# Patient Record
Sex: MALE | Race: WHITE | Employment: FULL TIME | URBAN - NONMETROPOLITAN AREA
[De-identification: names, ages, dates, MRNs, and addresses within clinical notes are randomized per-mention and may not be internally consistent; named-entity substitution may affect disease eponyms.]

---

## 2017-01-29 ENCOUNTER — THERAPY VISIT (OUTPATIENT)
Dept: SLEEP MEDICINE | Facility: HOSPITAL | Age: 52
End: 2017-01-29
Attending: INTERNAL MEDICINE
Payer: COMMERCIAL

## 2017-01-29 DIAGNOSIS — G47.419 NARCOLEPSY WITHOUT CATAPLEXY(347.00): Primary | ICD-10-CM

## 2017-01-29 DIAGNOSIS — G47.33 OBSTRUCTIVE SLEEP APNEA SYNDROME: ICD-10-CM

## 2017-01-29 PROCEDURE — 95811 POLYSOM 6/>YRS CPAP 4/> PARM: CPT

## 2017-01-29 PROCEDURE — 95811 POLYSOM 6/>YRS CPAP 4/> PARM: CPT | Mod: 26 | Performed by: INTERNAL MEDICINE

## 2017-01-29 NOTE — Clinical Note
Mikey Jenkins  [address] [facility] [address]    February 1, 2017         Dear Mikey      I recently read your sleep study, you do have sleep apnea stopping or slowing your breathing  about 20 times per hour.     This is likely disturbing your sleep and making you feel tired during the day. I think we should try you on  the CPAP mask , hopefully it will make you feel more rested during the day and may help protect you from future health problems.     I will ask our sleep lab staff to set up a trial of the mask, if you have any problems or concerns please give us a call.     I'll plan to see you in follow up in the future and I hope it helps.                                                                                       Sincerely,          Addy Daly MD, D,[facility]           [address] [place] [id number] [phone]

## 2017-01-30 NOTE — PROGRESS NOTES
The Pt was identified by name and  as well as wristband.  He is a 52yo man with c/o EDS. He has been told that he snores loudly.  Sleep testing and possible findings were discussed during hook up.  TIERRA and CPAP therapy were explained as he was fitted with a ResMed Airfit N20 medium mask.  Sleep onset was normal.  He exhibits loud snoring and snore arousals in nonREM sleep.  He had a shortened REM latency.  His REM sleep was fractured with apneas and hypopneas. The SpO2 baseline in sleep was 93%. The lowest SpO2 was 83% following an apnea while supine.  He reports a fairly normal night.  He could not say his sleep was any different with the CPAP.  He was up to the toilet early because he went to sleep earlier than usual.  The preliminary results were discussed and the probable treatment plan was explained.  After reviewing the vendor choices, he prefers [facility] [employer] [place] in [place] for his DME.

## 2017-01-30 NOTE — PROGRESS NOTES
Patient is a 52 y/o male in with c/o snoring and EDS.  Light to loud snoring with associated obstructive respiratory events noted, predominantly while supine, being more severe during REM stage.  CPAP-5cmH2O was initiated, increased to a final pressure of 7cmH2O due to snoring.  Patient tolerated study and CPAP well.

## 2017-02-01 NOTE — PROGRESS NOTES
50 y/o referred by Dr Le for excessive daytime somnolence.   Overnight 18 channel polysomnography was done 1/29/17, I reviewed the raw data in detail.Sleep efficiency was normal with a shortened sleep latency and a normal REM latency. Sleep architecture shows all stages seen in usual amounts for age. Baseline oxyhemoglobin saturation was 96%. The ECG was monitored and no arrhythmias were seen. There were no significant periodic leg movements noted.              Technician noted loud snoring. We measured 11 apneas and 59 hypopneas early in the study. These events were associated with EEG arousals and desats down to 83%. The apnea hypopnea index was 20 events per hour.              The patient was tried on nasal CPAP, which was tolerated well. Titration was gradually adjusted to a final level of 7 cm of H2O, pt slept on this level for 3 hours and sleep architecture was improved.    Impression: TIERRA  cpap at 7.

## 2017-02-24 ENCOUNTER — TELEPHONE (OUTPATIENT)
Dept: FAMILY MEDICINE | Facility: OTHER | Age: 52
End: 2017-02-24

## 2017-02-24 DIAGNOSIS — M10.00 IDIOPATHIC GOUT, UNSPECIFIED CHRONICITY, UNSPECIFIED SITE: ICD-10-CM

## 2017-02-24 RX ORDER — ALLOPURINOL 300 MG/1
1 TABLET ORAL DAILY
Qty: 90 TABLET | Refills: 0 | Status: SHIPPED | OUTPATIENT
Start: 2017-02-24 | End: 2017-06-28

## 2017-02-24 NOTE — TELEPHONE ENCOUNTER
Reason for call:  Medication    1. Medication Name?  Allopurinal  2. Is this request for a refill? Yes  3. What Pharmacy do you use?  [facility]  4. Have you contacted your pharmacy? Yes  5. If yes, when? Today  (Please note that the turn-around-time for prescriptions is 72 business hours; I am sending your request at this time. SEND TO  [facility]  )  Description:  Mikey is out of his medication and will be gone this weekend. Please call Mikey with any questions  Was an appointment offered for this a call? No  Preferred method for responding to this message  [phone]  If we cannot reach you directly, may we leave a detailed response at the number you provided? Yes

## 2017-03-21 ENCOUNTER — TRANSFERRED RECORDS (OUTPATIENT)
Dept: HEALTH INFORMATION MANAGEMENT | Facility: HOSPITAL | Age: 52
End: 2017-03-21

## 2017-03-21 ENCOUNTER — TELEPHONE (OUTPATIENT)
Dept: FAMILY MEDICINE | Facility: OTHER | Age: 52
End: 2017-03-21

## 2017-03-21 ENCOUNTER — OFFICE VISIT (OUTPATIENT)
Dept: UROLOGY | Facility: OTHER | Age: 52
End: 2017-03-21
Attending: NURSE PRACTITIONER
Payer: COMMERCIAL

## 2017-03-21 VITALS
BODY MASS INDEX: 29.82 KG/M2 | TEMPERATURE: 96.5 F | DIASTOLIC BLOOD PRESSURE: 80 MMHG | HEIGHT: 71 IN | WEIGHT: 213 LBS | SYSTOLIC BLOOD PRESSURE: 110 MMHG | HEART RATE: 60 BPM

## 2017-03-21 DIAGNOSIS — E29.1 TESTICULAR HYPOFUNCTION: Primary | ICD-10-CM

## 2017-03-21 DIAGNOSIS — N52.9 ERECTILE DYSFUNCTION, UNSPECIFIED ERECTILE DYSFUNCTION TYPE: ICD-10-CM

## 2017-03-21 PROCEDURE — 99213 OFFICE O/P EST LOW 20 MIN: CPT | Performed by: NURSE PRACTITIONER

## 2017-03-21 RX ORDER — SILDENAFIL 50 MG/1
TABLET, FILM COATED ORAL
Qty: 6 TABLET | Refills: 11 | Status: SHIPPED | OUTPATIENT
Start: 2017-03-21 | End: 2018-06-28

## 2017-03-21 RX ORDER — TESTOSTERONE CYPIONATE 200 MG/ML
200 INJECTION, SOLUTION INTRAMUSCULAR
Qty: 1 ML | Refills: 5 | OUTPATIENT
Start: 2017-03-21 | End: 2017-08-03

## 2017-03-21 ASSESSMENT — PAIN SCALES - GENERAL: PAINLEVEL: NO PAIN (0)

## 2017-03-21 NOTE — TELEPHONE ENCOUNTER
Last appointment was in November, follow-up appointment with labs was recommended in 6 months, which would be May.

## 2017-03-21 NOTE — TELEPHONE ENCOUNTER
Pt called wanting to know if he is supposed to come in for labs, regarding a prescription he thinks its for Lipitor. Pt will be in on Weds morning for lab. Pt wants off Lipitor because of one of the side affect of memory loss. Please call [phone]

## 2017-03-21 NOTE — TELEPHONE ENCOUNTER
So he dosent need to come in, in the am for labs yet? And to schedule him in May?     He is out of his lipitor

## 2017-03-21 NOTE — MR AVS SNAPSHOT
After Visit Summary   3/21/2017    Mikey Jenkins    MRN: [id number]           Patient Information     Date Of Birth          1965        Visit Information        Provider Department      3/21/2017 1:00 PM Berna Rey NP [facility]        Today's Diagnoses     Testicular hypofunction    -  1    Erectile dysfunction, unspecified erectile dysfunction type          Care Instructions    I ordered you Viagra for the erectile function.  Remember that if you have an erection lasting more than 4 hours you must go to the ER.  Read the other information below about Viagra.  (vision loss, etc...)  Please have your labs done again in June, mid way between injections, before 10:00 am, then see me a week later.        The following information is important for you to know regarding testosterone supplementation:  1) Testosterone may be supplied in the IM (shot) form, by patches, and by cream or gel.  2) The shot will be given every two weeks, relieving you of the responsibility of daily application.  The disadvantages include pain at injection site and possible fluctuations in mood, libido and energy.  3) Patches may be used, the primary disadvantage may be a rash that can develop from the patch.  4) Gels or creams must be placed daily.  They deliver a steady stream of testosterone.  It is important for you to know that you should follow package directions regarding how long you must wait before swimming or showering.  You should also not have skin to skin contact with children or females without washing the area first with soap and water.  You might otherwise transfer some testosterone accidentally to them.    5) Some issues to be aware of with testosterone supplementation:  You may experience acne or gynecomastia (tender or enlarged breasts), you may need to be screened for prostate cancer because it is possible that testosterone therapy could make prostate cancer worse , you could possibly  experience a urinary outflow problem from your prostate, sleep apnea may become worse (Ok if treated with a Cpap already), your hematocrit may become abnormally high and fertility may be affected.      Testosterone Supplementation Expectations:    I follow the guideline from the Endocrinology Society, for your safety.    1)  After our initial visit, with a digital rectal exam, we may start your supplementation if the lab levels indicate a need for it.  Initial labs most often include a testosterone level, a free testosterone level, a PSA and a hematocrit.  2)  You must see me again in 3 months to check how you are doing.  This is a clinic appt.You should have your labs drawn about a week prior to your appointment.  This will include a testosterone level and a hematocrit.  Your labs must be done prior to 10:00 am and if you are receiving injections, it must be mid way between injections.  3) Six months after you start therapy you should have labs again, labs only, no clinic appointment.  This will usually be a testosterone level and a hematocrit. Follow the same rules for labs as above.  4) One year after you have started therapy you must see me in the clinic again.  You should have labs done about a week before.  These will include usually a testosterone level, a PSA and a hematocrit.    5) If you become well established with the therapy we can reduce your appointments now to yearly.  You may need labs at 6 months.    However... Please note that if you need dose adjustments, this schedule may change and I may need labs sooner.  (or if you have problems)  I am most concerned about your safety.  If you do not have labs and appointments as scheduled, I will not order more testosterone.         Patient Education    Sildenafil Citrate Oral suspension [Pulmonary Hypertension]    Sildenafil Citrate Oral tablet    Sildenafil Citrate Oral tablet [Pulmonary Hypertension]    Sildenafil Citrate Solution for injection [Pulmonary  Hypertension]  Sildenafil Citrate Oral tablet  What is this medicine?  SILDENAFIL (lux DEN a qiana) is used to treat erection problems in men.  This medicine may be used for other purposes; ask your health care provider or pharmacist if you have questions.  What should I tell my health care provider before I take this medicine?  They need to know if you have any of these conditions:    bleeding disorders    eye or vision problems, including a rare inherited eye disease called retinitis pigmentosa    anatomical deformation of the penis, Peyronie s disease, or history of priapism (painful and prolonged erection)    heart disease, angina, a history of heart attack, irregular heart beats, or other heart problems    high or low blood pressure    history of blood diseases, like sickle cell anemia or leukemia    history of stomach bleeding    kidney disease    liver disease    stroke    an unusual or allergic reaction to sildenafil, other medicines, foods, dyes, or preservatives    pregnant or trying to get pregnant    breast-feeding  How should I use this medicine?  Take this medicine by mouth with a glass of water. Follow the directions on the prescription label. The dose is usually taken 1 hour before sexual activity. You should not take the dose more than once per day. Do not take your medicine more often than directed.  Talk to your pediatrician regarding the use of this medicine in children. This medicine is not used in children for this condition.  Overdosage: If you think you have taken too much of this medicine contact a poison control center or emergency room at once.  NOTE: This medicine is only for you. Do not share this medicine with others.  What if I miss a dose?  This does not apply. Do not take double or extra doses.  What may interact with this medicine?  Do not take this medicine with any of the following medications:    cisapride    methscopolamine nitrate    nitrates like amyl nitrite, isosorbide  dinitrate, isosorbide mononitrate, nitroglycerin    nitroprusside    other medicines for erectile dysfunction like avanafil, tadalafil, vardenafil    riociguat    other sildenafil products (Revatio)  This medicine may also interact with the following medications:    certain drugs for high blood pressure    certain drugs for the treatment of HIV infection or AIDS    certain drugs used for fungal or yeast infections, like fluconazole, itraconazole, ketoconazole, and voriconazole    cimetidine    erythromycin    rifampin  This list may not describe all possible interactions. Give your health care provider a list of all the medicines, herbs, non-prescription drugs, or dietary supplements you use. Also tell them if you smoke, drink alcohol, or use illegal drugs. Some items may interact with your medicine.  What should I watch for while using this medicine?  If you notice any changes in your vision while taking this drug, call your doctor or health care professional as soon as possible. Stop using this medicine and call your health care provider right away if you have a loss of sight in one or both eyes.  Contact your doctor or health care professional right away if you have an erection that lasts longer than 4 hours or if it becomes painful. This may be a sign of a serious problem and must be treated right away to prevent permanent damage.  If you experience symptoms of nausea, dizziness, chest pain or arm pain upon initiation of sexual activity after taking this medicine, you should refrain from further activity and call your doctor or health care professional as soon as possible.  Do not drink alcohol to excess (examples, 5 glasses of wine or 5 shots of whiskey) when taking this medicine. When taken in excess, alcohol can increase your chances of getting a headache or getting dizzy, increasing your heart rate or lowering your blood pressure.  Using this medicine does not protect you or your partner against HIV  infection (the virus that causes AIDS) or other sexually transmitted diseases.  What side effects may I notice from receiving this medicine?  Side effects that you should report to your doctor or health care professional as soon as possible:    allergic reactions like skin rash, itching or hives, swelling of the face, lips, or tongue    breathing problems    changes in hearing    changes in vision    chest pain    fast, irregular heartbeat    prolonged or painful erection    seizures  Side effects that usually do not require medical attention (report to your doctor or health care professional if they continue or are bothersome):    back pain    dizziness    flushing    headache    indigestion    muscle aches    nausea    stuffy or runny nose  This list may not describe all possible side effects. Call your doctor for medical advice about side effects. You may report side effects to FDA at 0-009-TIY-6547.  Where should I keep my medicine?  Keep out of reach of children.  Store at room temperature between 15 and 30 degrees C (59 and 86 degrees F). Throw away any unused medicine after the expiration date.  NOTE:This sheet is a summary. It may not cover all possible information. If you have questions about this medicine, talk to your doctor, pharmacist, or health care provider. Copyright  2016 Gold Standard              Follow-ups after your visit        Your next 10 appointments already scheduled     Apr 04, 2017 11:15 AM CDT   (Arrive by 11:00 AM)   Nurse Only with [facility] FP NURSE   [facility] ([facility] )    [address] [address]   [phone]            Apr 18, 2017 11:15 AM CDT   (Arrive by 11:00 AM)   Nurse Only with MT FP NURSE   [facility] ([facility] )    [address] [address]   [phone]            May 02, 2017 11:15 AM CDT   (Arrive by 11:00 AM)   Nurse Only with MT FP NURSE   [facility] ([facility] )    [address]  [place] [address]   [phone]            May 16, 2017 11:15 AM CDT   (Arrive by 11:00 AM)   Nurse Only with MT FP NURSE   [facility] ([facility] )    [address] [place] [address]   [phone]            May 30, 2017 11:15 AM CDT   (Arrive by 11:00 AM)   Nurse Only with MT FP NURSE   [facility] ([facility] )    [address]  [place] [address]   [phone]            Jun 06, 2017  8:00 AM CDT   LAB with MT LAB   [facility] ([facility] )    [address]  [place] [address]   [phone]            Jun 13, 2017 11:00 AM CDT   (Arrive by 10:45 AM)   Return Visit with Berna Rey NP   [facility] ([facility])    [address]  [facility] [address]   [phone]              Future tests that were ordered for you today     Open Future Orders        Priority Expected Expires Ordered    Hematocrit Routine 6/1/2017 6/30/2017 3/21/2017    Testosterone Free and Total Routine 6/1/2017 6/30/2017 3/21/2017            Who to contact     If you have questions or need follow up information about today's clinic visit or your schedule please contact [facility] directly at [phone].  Normal or non-critical lab and imaging results will be communicated to you by MyChart, letter or phone within 4 business days after the clinic has received the results. If you do not hear from us within 7 days, please contact the clinic through [employer] or phone. If you have a critical or abnormal lab result, we will notify you by phone as soon as possible.  Submit refill requests through [employer] or call your pharmacy and they will forward the refill request to us. Please allow 3 business days for your refill to be completed.          Additional Information About Your Visit        [employer] Information     [employer] lets you send  "messages to your doctor, view your test results, renew your prescriptions, schedule appointments and more. To sign up, go to [email or web address] . Click on \"Log in\" on the left side of the screen, which will take you to the Welcome page. Then click on \"Sign up Now\" on the right side of the page.     You will be asked to enter the access code listed below, as well as some personal information. Please follow the directions to create your username and password.     Your access code is: [email or web address]  Expires: 2017  1:22 PM     Your access code will  in 90 days. If you need help or a new code, please call your [facility] clinic or [phone].        Care EveryWhere ID     This is your Care EveryWhere ID. This could be used by other organizations to access your [facility] medical records  [id number]        Your Vitals Were     Pulse Temperature Height BMI (Body Mass Index)          60 96.5  F (35.8  C) (Tympanic) 5' 11\" (1.803 m) 29.71 kg/m2         Blood Pressure from Last 3 Encounters:   17 110/80   16 120/80   16 118/78    Weight from Last 3 Encounters:   17 213 lb (96.6 kg)   16 215 lb (97.5 kg)   16 215 lb (97.5 kg)                 Today's Medication Changes          These changes are accurate as of: 3/21/17  1:33 PM.  If you have any questions, ask your nurse or doctor.               Start taking these medicines.        Dose/Directions    sildenafil 50 MG cap/tab   Commonly known as:  REVATIO/VIAGRA   Used for:  Erectile dysfunction, unspecified erectile dysfunction type   Started by:  Berna Rey NP        Take 1/2 to 1 tablet daily as needed   Quantity:  6 tablet   Refills:  11       testosterone cypionate 200 MG/ML injection   Commonly known as:  DEPOTESTOTERONE CYPIONATE   Used for:  Testicular hypofunction   Started by:  Berna Rey NP        Dose:  200 mg   Inject 1 mL (200 mg) into the muscle every 14 days   Quantity:  1 mL   Refills:  5          "   Where to get your medicines      These medications were sent to [facility] - [address]  [address] [place] [address]     Phone:  [phone]     sildenafil 50 MG cap/tab         Some of these will need a paper prescription and others can be bought over the counter.  Ask your nurse if you have questions.     You don't need a prescription for these medications     testosterone cypionate 200 MG/ML injection                Primary Care Provider Office Phone # Fax #    Anai Le [phone]       [facility] [address] [address]        Thank you!     Thank you for choosing [facility]  for your care. Our goal is always to provide you with excellent care. Hearing back from our patients is one way we can continue to improve our services. Please take a few minutes to complete the written survey that you may receive in the mail after your visit with us. Thank you!             Your Updated Medication List - Protect others around you: Learn how to safely use, store and throw away your medicines at www.disposemymeds.org.          This list is accurate as of: 3/21/17  1:33 PM.  Always use your most recent med list.                   Brand Name Dispense Instructions for use    allopurinol 300 MG tablet    ZYLOPRIM    90 tablet    Take 1 tablet (300 mg) by mouth daily       atorvastatin 40 MG tablet    LIPITOR    90 tablet    Take 1 tablet (40 mg) by mouth daily       colchicine 0.6 MG tablet    COLCRYS    9 tablet    Take 2 tablets at the first sign of flare, take 1 additional tablet one hour later.       ibuprofen 200 MG tablet    ADVIL/MOTRIN     Take 3 tablets by mouth every 12 hours as needed. With food       indomethacin 25 MG capsule    INDOCIN    30 capsule    Take 1-2 capsules (25-50 mg) by mouth 3 times daily as needed (gout)       sildenafil 50 MG cap/tab    REVATIO/VIAGRA    6 tablet    Take 1/2 to 1  tablet daily as needed       testosterone cypionate 200 MG/ML injection    DEPOTESTOTERONE CYPIONATE    1 mL    Inject 1 mL (200 mg) into the muscle every 14 days

## 2017-03-21 NOTE — PROGRESS NOTES
CC: Follow up for a low testosterone level.    HPI: Mr. Mikey Jenkins is a very pleasant 51 year old male seen today in the urology clinic for follow up regarding a low testosterone level.  I last saw him on 11/22/16.  At that visit we agreed that I would start testosterone therapy after he had a sleep study done.  His study was completed, he saw Dr. Vaca on 1/29/17 and he is now on nasal CPAP.     His last testosterone level was 290, drawn on 11/7/16.  His prostate specific antigen is 1.71 (11/22/16) and his hct is 47.1 (11/22/16).      The primary symptoms the patient has in regards to his testicular hypofunction are lack of energy and poor libido.        Review Of Systems  Skin: negative  Eyes: negative  Ears/Nose/Throat: negative  Respiratory: No shortness of breath, dyspnea on exertion, cough, or hemoptysis.  Sleep apnea.  Cardiovascular: negative  Gastrointestinal: negative  Genitourinary: negative  Musculoskeletal: gout  Neurologic: negative  Psychiatric: negative  Hematologic/Lymphatic/Immunologic: negative  Endocrine: negative    Current Outpatient Prescriptions   Medication     allopurinol (ZYLOPRIM) 300 MG tablet     colchicine (COLCRYS) 0.6 MG tablet     indomethacin (INDOCIN) 25 MG capsule     ibuprofen (ADVIL,MOTRIN) 200 MG tablet     atorvastatin (LIPITOR) 40 MG tablet     No current facility-administered medications for this visit.      No Known Allergies    Past Surgical History   Procedure Laterality Date     Inguinal hernia repair  1987     Circumcision       Vasectomy       Ankle open reduction internal fixation       RT     Orthopedic surgery       right ankle     Hernia repair       umbilical     C oral surgery procedure Left      wisdom tooth scaped      Past Medical History   Diagnosis Date     Gout, unspecified 12/3/2010     Unspecified hearing loss 12/3/2010     Social History     Social History     Marital status:      Spouse name: N/A     Number of children: N/A     Years of  "education: N/A     Occupational History     Not on file.     Social History Main Topics     Smoking status: Never Smoker     Smokeless tobacco: Never Used     Alcohol use Yes      Comment: 4 beers, weekly     Drug use: No     Sexual activity: Not on file     Other Topics Concern     Blood Transfusions Yes     Caffeine Concern Yes     Coffe, soda; 32 oz daily     Exercise Yes     walking     Parent/Sibling W/ Cabg, Mi Or Angioplasty Before 65f 55m? No     Social History Narrative     Family History   Problem Relation Age of Onset     C.A.D. Father      Dementia Father      Arthritis Brother      gout     PHYSICAL EXAM:   /80 (BP Location: Left arm, Patient Position: Chair, Cuff Size: Adult Regular)  Pulse 60  Temp 96.5  F (35.8  C) (Tympanic)  Ht 1.803 m (5' 11\")  Wt 96.6 kg (213 lb)  BMI 29.71 kg/m2  GENERAL: Well groomed, well developed, well nourished male in no obvious distress.  RESP: No increased respiratory effort. Lungs clear to auscultation bilateral.  CV: RRR, no murmurs/rubs/gallops.  LYMPH: No LE edema.  ABD: Soft, non-tender, non-distended, no palpable masses.  Normoactive BS.  No CVA tenderness bilaterally.  MS: Full ROM in extremities.  ANGELY:      Normal rectal tone, no amount of stool in the rectal vault.      Normal sized prostate, no tenderness, mass or asymmetry.  NEURO: Alert and oriented x 3.  PSYCH: Normal mood and affect, pleasant and agreeable during interview and exam.    ASSESSMENT/PLAN:  Mr. Mikey Jenkins is a very pleasant 51 year old male with a history of hypogonadism with symptoms of fatigue, excessive sleepiness and greatly decreased libido.  He will be started on testosterone 200 mg today.  He will come to the clinic for his injections.    He will also try sildenafil for his erectile dysfunction issues.    He has been given information and verbalizes understanding of the risks and benefits of both testosterone therapy and sildenafil.   He will have labs done again in June, and see " me a week after.      I have enjoyed participating in the medical care of this very pleasant patient.  Using layterms, and diagrams when needed, I explained the pathophysiology, usual course, potential complications, recommended monitoring, preventive modalities, treatment rationale, risks, and benefits to Mr. Mikey Jenkins. The patient appeared to understand and all questions were satisfactorily answered.      Berna Rey  [facility], CWN  Urology and Wound Care  March 21, 2017

## 2017-03-21 NOTE — PATIENT INSTRUCTIONS
I ordered you Viagra for the erectile function.  Remember that if you have an erection lasting more than 4 hours you must go to the ER.  Read the other information below about Viagra.  (vision loss, etc...)  Please have your labs done again in June, mid way between injections, before 10:00 am, then see me a week later.        The following information is important for you to know regarding testosterone supplementation:  1) Testosterone may be supplied in the IM (shot) form, by patches, and by cream or gel.  2) The shot will be given every two weeks, relieving you of the responsibility of daily application.  The disadvantages include pain at injection site and possible fluctuations in mood, libido and energy.  3) Patches may be used, the primary disadvantage may be a rash that can develop from the patch.  4) Gels or creams must be placed daily.  They deliver a steady stream of testosterone.  It is important for you to know that you should follow package directions regarding how long you must wait before swimming or showering.  You should also not have skin to skin contact with children or females without washing the area first with soap and water.  You might otherwise transfer some testosterone accidentally to them.    5) Some issues to be aware of with testosterone supplementation:  You may experience acne or gynecomastia (tender or enlarged breasts), you may need to be screened for prostate cancer because it is possible that testosterone therapy could make prostate cancer worse , you could possibly experience a urinary outflow problem from your prostate, sleep apnea may become worse (Ok if treated with a Cpap already), your hematocrit may become abnormally high and fertility may be affected.      Testosterone Supplementation Expectations:    I follow the guideline from the Endocrinology Society, for your safety.    1)  After our initial visit, with a digital rectal exam, we may start your supplementation if the lab  levels indicate a need for it.  Initial labs most often include a testosterone level, a free testosterone level, a PSA and a hematocrit.  2)  You must see me again in 3 months to check how you are doing.  This is a clinic appt.You should have your labs drawn about a week prior to your appointment.  This will include a testosterone level and a hematocrit.  Your labs must be done prior to 10:00 am and if you are receiving injections, it must be mid way between injections.  3) Six months after you start therapy you should have labs again, labs only, no clinic appointment.  This will usually be a testosterone level and a hematocrit. Follow the same rules for labs as above.  4) One year after you have started therapy you must see me in the clinic again.  You should have labs done about a week before.  These will include usually a testosterone level, a PSA and a hematocrit.    5) If you become well established with the therapy we can reduce your appointments now to yearly.  You may need labs at 6 months.    However... Please note that if you need dose adjustments, this schedule may change and I may need labs sooner.  (or if you have problems)  I am most concerned about your safety.  If you do not have labs and appointments as scheduled, I will not order more testosterone.         Patient Education    Sildenafil Citrate Oral suspension [Pulmonary Hypertension]    Sildenafil Citrate Oral tablet    Sildenafil Citrate Oral tablet [Pulmonary Hypertension]    Sildenafil Citrate Solution for injection [Pulmonary Hypertension]  Sildenafil Citrate Oral tablet  What is this medicine?  SILDENAFIL (lux DEN a qiana) is used to treat erection problems in men.  This medicine may be used for other purposes; ask your health care provider or pharmacist if you have questions.  What should I tell my health care provider before I take this medicine?  They need to know if you have any of these conditions:    bleeding disorders    eye or vision  problems, including a rare inherited eye disease called retinitis pigmentosa    anatomical deformation of the penis, Peyronie s disease, or history of priapism (painful and prolonged erection)    heart disease, angina, a history of heart attack, irregular heart beats, or other heart problems    high or low blood pressure    history of blood diseases, like sickle cell anemia or leukemia    history of stomach bleeding    kidney disease    liver disease    stroke    an unusual or allergic reaction to sildenafil, other medicines, foods, dyes, or preservatives    pregnant or trying to get pregnant    breast-feeding  How should I use this medicine?  Take this medicine by mouth with a glass of water. Follow the directions on the prescription label. The dose is usually taken 1 hour before sexual activity. You should not take the dose more than once per day. Do not take your medicine more often than directed.  Talk to your pediatrician regarding the use of this medicine in children. This medicine is not used in children for this condition.  Overdosage: If you think you have taken too much of this medicine contact a poison control center or emergency room at once.  NOTE: This medicine is only for you. Do not share this medicine with others.  What if I miss a dose?  This does not apply. Do not take double or extra doses.  What may interact with this medicine?  Do not take this medicine with any of the following medications:    cisapride    methscopolamine nitrate    nitrates like amyl nitrite, isosorbide dinitrate, isosorbide mononitrate, nitroglycerin    nitroprusside    other medicines for erectile dysfunction like avanafil, tadalafil, vardenafil    riociguat    other sildenafil products (Revatio)  This medicine may also interact with the following medications:    certain drugs for high blood pressure    certain drugs for the treatment of HIV infection or AIDS    certain drugs used for fungal or yeast infections, like  fluconazole, itraconazole, ketoconazole, and voriconazole    cimetidine    erythromycin    rifampin  This list may not describe all possible interactions. Give your health care provider a list of all the medicines, herbs, non-prescription drugs, or dietary supplements you use. Also tell them if you smoke, drink alcohol, or use illegal drugs. Some items may interact with your medicine.  What should I watch for while using this medicine?  If you notice any changes in your vision while taking this drug, call your doctor or health care professional as soon as possible. Stop using this medicine and call your health care provider right away if you have a loss of sight in one or both eyes.  Contact your doctor or health care professional right away if you have an erection that lasts longer than 4 hours or if it becomes painful. This may be a sign of a serious problem and must be treated right away to prevent permanent damage.  If you experience symptoms of nausea, dizziness, chest pain or arm pain upon initiation of sexual activity after taking this medicine, you should refrain from further activity and call your doctor or health care professional as soon as possible.  Do not drink alcohol to excess (examples, 5 glasses of wine or 5 shots of whiskey) when taking this medicine. When taken in excess, alcohol can increase your chances of getting a headache or getting dizzy, increasing your heart rate or lowering your blood pressure.  Using this medicine does not protect you or your partner against HIV infection (the virus that causes AIDS) or other sexually transmitted diseases.  What side effects may I notice from receiving this medicine?  Side effects that you should report to your doctor or health care professional as soon as possible:    allergic reactions like skin rash, itching or hives, swelling of the face, lips, or tongue    breathing problems    changes in hearing    changes in vision    chest pain    fast,  irregular heartbeat    prolonged or painful erection    seizures  Side effects that usually do not require medical attention (report to your doctor or health care professional if they continue or are bothersome):    back pain    dizziness    flushing    headache    indigestion    muscle aches    nausea    stuffy or runny nose  This list may not describe all possible side effects. Call your doctor for medical advice about side effects. You may report side effects to FDA at 1-841-ORJ-1520.  Where should I keep my medicine?  Keep out of reach of children.  Store at room temperature between 15 and 30 degrees C (59 and 86 degrees F). Throw away any unused medicine after the expiration date.  NOTE:This sheet is a summary. It may not cover all possible information. If you have questions about this medicine, talk to your doctor, pharmacist, or health care provider. Copyright  2016 Gold Standard

## 2017-03-21 NOTE — NURSING NOTE
"Chief Complaint   Patient presents with     RECHECK     Low Testosterone       Initial /80 (BP Location: Left arm, Patient Position: Chair, Cuff Size: Adult Regular)  Pulse 60  Temp 96.5  F (35.8  C) (Tympanic)  Ht 1.803 m (5' 11\")  Wt 96.6 kg (213 lb)  BMI 29.71 kg/m2 Estimated body mass index is 29.71 kg/(m^2) as calculated from the following:    Height as of this encounter: 1.803 m (5' 11\").    Weight as of this encounter: 96.6 kg (213 lb).  Medication Reconciliation: complete   SHAHZAD MANDEL      "

## 2017-03-21 NOTE — PROGRESS NOTES
MEDICATION: Testosterone 200 mg  ROUTE: IM  SITE: [facility]  DOSE: 200mg/mL  LOT #: [id number]  :  [employer]  EXPIRATION DATE:  05/2019  NDC#: 7204-6468-41  SHAHZAD MANDEL

## 2017-03-21 NOTE — TELEPHONE ENCOUNTER
Refill of Lipitor should be available at pharmacy (6 months written when started in November).  Appointment with labs in May.  Should call pharmacy to confirm refill is available.

## 2017-04-04 ENCOUNTER — ALLIED HEALTH/NURSE VISIT (OUTPATIENT)
Dept: FAMILY MEDICINE | Facility: OTHER | Age: 52
End: 2017-04-04
Attending: NURSE PRACTITIONER
Payer: COMMERCIAL

## 2017-04-04 DIAGNOSIS — N52.9 ED (ERECTILE DYSFUNCTION): Primary | ICD-10-CM

## 2017-04-04 PROCEDURE — 96372 THER/PROPH/DIAG INJ SC/IM: CPT

## 2017-04-04 NOTE — MR AVS SNAPSHOT
After Visit Summary   4/4/2017    Mikey Jenkins    MRN: [id number]           Patient Information     Date Of Birth          1965        Visit Information        Provider Department      4/4/2017 11:15 AM MT FP NURSE [facility]        Today's Diagnoses     ED (erectile dysfunction)    -  1       Follow-ups after your visit        Your next 10 appointments already scheduled     Apr 18, 2017 11:15 AM CDT   (Arrive by 11:00 AM)   Nurse Only with MT FP NURSE   [facility] ([facility] )    [address]  [place] [address]   [phone]            May 02, 2017 11:15 AM CDT   (Arrive by 11:00 AM)   Nurse Only with MT FP NURSE   [facility] ([facility] )    [address]  [place] [address]   [phone]            May 16, 2017 11:00 AM CDT   (Arrive by 10:45 AM)   SHORT with Anai Le MD   [facility] ([facility] )    [address]  [place] [address]   [phone]            May 30, 2017 11:15 AM CDT   (Arrive by 11:00 AM)   Nurse Only with MT FP NURSE   [facility] ([facility] )    [address]  [place] [address]   [phone]            Jun 06, 2017  8:00 AM CDT   LAB with MT LAB   [facility] ([facility] )    [address]  [place] [address]   [phone]            Jun 13, 2017 11:00 AM CDT   (Arrive by 10:45 AM)   Return Visit with Berna Rey NP   [facility] ([facility])    [address] [place] [address]   [phone]              Who to contact     If you have questions or need follow up information about today's clinic visit or your schedule please contact [facility] directly at [phone].  Normal or non-critical lab and imaging results will be communicated to you by MyChart, letter or phone within 4 business days after the  "clinic has received the results. If you do not hear from us within 7 days, please contact the clinic through [employer] or phone. If you have a critical or abnormal lab result, we will notify you by phone as soon as possible.  Submit refill requests through [employer] or call your pharmacy and they will forward the refill request to us. Please allow 3 business days for your refill to be completed.          Additional Information About Your Visit        [employer] Information     [employer] lets you send messages to your doctor, view your test results, renew your prescriptions, schedule appointments and more. To sign up, go to [email or web address] . Click on \"Log in\" on the left side of the screen, which will take you to the Welcome page. Then click on \"Sign up Now\" on the right side of the page.     You will be asked to enter the access code listed below, as well as some personal information. Please follow the directions to create your username and password.     Your access code is: [email or web address]  Expires: 2017  1:22 PM     Your access code will  in 90 days. If you need help or a new code, please call your [facility] clinic or [phone].        Care EveryWhere ID     This is your Care EveryWhere ID. This could be used by other organizations to access your [facility] medical records  [id number]         Blood Pressure from Last 3 Encounters:   17 110/80   16 120/80   16 118/78    Weight from Last 3 Encounters:   17 213 lb (96.6 kg)   16 215 lb (97.5 kg)   16 215 lb (97.5 kg)              We Performed the Following     INJECTION INTRAMUSCULAR OR SUB-Q     TESTOSTERONE CYPIONATE INJECTION 1MG        Primary Care Provider Office Phone # Fax #    Anai Le [phone]       [facility] [address] [place] [address]        Thank you!     Thank you for choosing [facility]  for your care. Our goal is always to provide " you with excellent care. Hearing back from our patients is one way we can continue to improve our services. Please take a few minutes to complete the written survey that you may receive in the mail after your visit with us. Thank you!             Your Updated Medication List - Protect others around you: Learn how to safely use, store and throw away your medicines at www.disposemymeds.org.          This list is accurate as of: 4/4/17 11:23 AM.  Always use your most recent med list.                   Brand Name Dispense Instructions for use    allopurinol 300 MG tablet    ZYLOPRIM    90 tablet    Take 1 tablet (300 mg) by mouth daily       atorvastatin 40 MG tablet    LIPITOR    90 tablet    Take 1 tablet (40 mg) by mouth daily       colchicine 0.6 MG tablet    COLCRYS    9 tablet    Take 2 tablets at the first sign of flare, take 1 additional tablet one hour later.       ibuprofen 200 MG tablet    ADVIL/MOTRIN     Take 3 tablets by mouth every 12 hours as needed. With food       indomethacin 25 MG capsule    INDOCIN    30 capsule    Take 1-2 capsules (25-50 mg) by mouth 3 times daily as needed (gout)       sildenafil 50 MG cap/tab    REVATIO/VIAGRA    6 tablet    Take 1/2 to 1 tablet daily as needed       testosterone cypionate 200 MG/ML injection    DEPOTESTOTERONE CYPIONATE    1 mL    Inject 1 mL (200 mg) into the muscle every 14 days

## 2017-04-04 NOTE — NURSING NOTE
The following medication was given:     MEDICATION: Depo Testosterone  200 mg  ROUTE: IM  SITE: [facility]  DOSE: 200mg  LOT #: [id number]  :  [employer]  EXPIRATION DATE:  5/19  NDC#: 2919-3360-08  Kaylah Weems

## 2017-04-18 ENCOUNTER — ALLIED HEALTH/NURSE VISIT (OUTPATIENT)
Dept: FAMILY MEDICINE | Facility: OTHER | Age: 52
End: 2017-04-18
Attending: NURSE PRACTITIONER
Payer: COMMERCIAL

## 2017-04-18 DIAGNOSIS — N52.9 ED (ERECTILE DYSFUNCTION): Primary | ICD-10-CM

## 2017-04-18 PROCEDURE — 96372 THER/PROPH/DIAG INJ SC/IM: CPT

## 2017-04-18 NOTE — NURSING NOTE
The following medication was given:     MEDICATION: Testosterone 200 mg  ROUTE: IM  SITE: [facility]  DOSE: 200mg  LOT #: [id number]  :  [employer]  EXPIRATION DATE:  5/19  NDC#: 9925-5133-00  Kaylah Weems

## 2017-04-20 ENCOUNTER — TRANSFERRED RECORDS (OUTPATIENT)
Dept: HEALTH INFORMATION MANAGEMENT | Facility: HOSPITAL | Age: 52
End: 2017-04-20

## 2017-05-02 ENCOUNTER — ALLIED HEALTH/NURSE VISIT (OUTPATIENT)
Dept: FAMILY MEDICINE | Facility: OTHER | Age: 52
End: 2017-05-02
Attending: NURSE PRACTITIONER
Payer: COMMERCIAL

## 2017-05-02 DIAGNOSIS — E29.1 TESTICULAR HYPOFUNCTION: Primary | ICD-10-CM

## 2017-05-02 PROCEDURE — 96372 THER/PROPH/DIAG INJ SC/IM: CPT

## 2017-05-02 NOTE — MR AVS SNAPSHOT
After Visit Summary   5/2/2017    Mikey Jenkins    MRN: [id number]           Patient Information     Date Of Birth          1965        Visit Information        Provider Department      5/2/2017 11:15 AM MT FP NURSE [facility]        Today's Diagnoses     Testicular hypofunction    -  1       Follow-ups after your visit        Your next 10 appointments already scheduled     May 02, 2017 11:15 AM CDT   (Arrive by 11:00 AM)   Nurse Only with MT FP NURSE   [facility] ([facility]    [address]  [place] [address]   [phone]            May 16, 2017 11:00 AM CDT   (Arrive by 10:45 AM)   SHORT with Anai Le MD   [facility] ([facility] )    [address]  [place] [address]   [phone]            May 30, 2017 11:15 AM CDT   (Arrive by 11:00 AM)   Nurse Only with MT FP NURSE   [facility] ([facility] )    [address]  [place] [address]   [phone]            Jun 06, 2017  8:00 AM CDT   LAB with MT LAB   [facility] ([facility]    [address]  [place] [address]   [phone]            Jun 13, 2017 11:00 AM CDT   (Arrive by 10:45 AM)   Return Visit with Berna Rey NP   [facility] [place] ([facility] [facility])    [address]  [place] [address]   [phone]              Who to contact     If you have questions or need follow up information about today's clinic visit or your schedule please contact [facility] directly at [phone].  Normal or non-critical lab and imaging results will be communicated to you by MyChart, letter or phone within 4 business days after the clinic has received the results. If you do not hear from us within 7 days, please contact the clinic through MyChart or phone. If you have a critical or abnormal lab result, we will notify you by phone as soon as  "possible.  Submit refill requests through [employer] or call your pharmacy and they will forward the refill request to us. Please allow 3 business days for your refill to be completed.          Additional Information About Your Visit        [employer] Information     [employer] lets you send messages to your doctor, view your test results, renew your prescriptions, schedule appointments and more. To sign up, go to [email or web address] . Click on \"Log in\" on the left side of the screen, which will take you to the Welcome page. Then click on \"Sign up Now\" on the right side of the page.     You will be asked to enter the access code listed below, as well as some personal information. Please follow the directions to create your username and password.     Your access code is: [email or web address]  Expires: 2017  1:22 PM     Your access code will  in 90 days. If you need help or a new code, please call your [place] clinic or [phone].        Care EveryWhere ID     This is your Care EveryWhere ID. This could be used by other organizations to access your [place] medical records  [id number]         Blood Pressure from Last 3 Encounters:   17 110/80   16 120/80   16 118/78    Weight from Last 3 Encounters:   17 213 lb (96.6 kg)   16 215 lb (97.5 kg)   16 215 lb (97.5 kg)              We Performed the Following     TESTOSTERONE CYPIONATE INJECTION 1MG     THER/PROPH/DIAG INJ, SC/IM        Primary Care Provider Office Phone # Fax #    Anai Le [phone]       [facility] [address] [facility] [address]        Thank you!     Thank you for choosing [facility]  for your care. Our goal is always to provide you with excellent care. Hearing back from our patients is one way we can continue to improve our services. Please take a few minutes to complete the written survey that you may receive in the mail after your visit with " us. Thank you!             Your Updated Medication List - Protect others around you: Learn how to safely use, store and throw away your medicines at www.disposemymeds.org.          This list is accurate as of: 5/2/17 11:08 AM.  Always use your most recent med list.                   Brand Name Dispense Instructions for use    allopurinol 300 MG tablet    ZYLOPRIM    90 tablet    Take 1 tablet (300 mg) by mouth daily       atorvastatin 40 MG tablet    LIPITOR    90 tablet    Take 1 tablet (40 mg) by mouth daily       colchicine 0.6 MG tablet    COLCRYS    9 tablet    Take 2 tablets at the first sign of flare, take 1 additional tablet one hour later.       ibuprofen 200 MG tablet    ADVIL/MOTRIN     Take 3 tablets by mouth every 12 hours as needed. With food       indomethacin 25 MG capsule    INDOCIN    30 capsule    Take 1-2 capsules (25-50 mg) by mouth 3 times daily as needed (gout)       sildenafil 50 MG cap/tab    REVATIO/VIAGRA    6 tablet    Take 1/2 to 1 tablet daily as needed       testosterone cypionate 200 MG/ML injection    DEPOTESTOTERONE    1 mL    Inject 1 mL (200 mg) into the muscle every 14 days

## 2017-05-02 NOTE — PROGRESS NOTES
The following medication was given: testosterone 200mg    MEDICATION: Depo Testosterone  200 mg  ROUTE: IM  SITE: [facility]  DOSE: 200mg  LOT #: [id number]  :  [employer]  EXPIRATION DATE:  05/19  NDC#: [id number]  Pamela M Lechevalier LPN

## 2017-05-16 ENCOUNTER — OFFICE VISIT (OUTPATIENT)
Dept: FAMILY MEDICINE | Facility: OTHER | Age: 52
End: 2017-05-16
Attending: FAMILY MEDICINE
Payer: COMMERCIAL

## 2017-05-16 VITALS
WEIGHT: 219 LBS | BODY MASS INDEX: 30.66 KG/M2 | DIASTOLIC BLOOD PRESSURE: 70 MMHG | SYSTOLIC BLOOD PRESSURE: 110 MMHG | HEIGHT: 71 IN | TEMPERATURE: 97.5 F | HEART RATE: 68 BPM

## 2017-05-16 DIAGNOSIS — E78.5 HYPERLIPIDEMIA, UNSPECIFIED HYPERLIPIDEMIA TYPE: Primary | ICD-10-CM

## 2017-05-16 DIAGNOSIS — M25.562 CHRONIC PAIN OF BOTH KNEES: ICD-10-CM

## 2017-05-16 DIAGNOSIS — M25.561 CHRONIC PAIN OF BOTH KNEES: ICD-10-CM

## 2017-05-16 DIAGNOSIS — G89.29 CHRONIC PAIN OF BOTH KNEES: ICD-10-CM

## 2017-05-16 DIAGNOSIS — E29.1 TESTICULAR HYPOFUNCTION: ICD-10-CM

## 2017-05-16 LAB
ALT SERPL W P-5'-P-CCNC: 30 U/L (ref 0–70)
CHOLEST SERPL-MCNC: 112 MG/DL
HDLC SERPL-MCNC: 30 MG/DL
LDLC SERPL CALC-MCNC: 37 MG/DL
NONHDLC SERPL-MCNC: 82 MG/DL
TRIGL SERPL-MCNC: 223 MG/DL

## 2017-05-16 PROCEDURE — 80061 LIPID PANEL: CPT | Performed by: FAMILY MEDICINE

## 2017-05-16 PROCEDURE — 84460 ALANINE AMINO (ALT) (SGPT): CPT | Performed by: FAMILY MEDICINE

## 2017-05-16 PROCEDURE — 99213 OFFICE O/P EST LOW 20 MIN: CPT | Performed by: FAMILY MEDICINE

## 2017-05-16 PROCEDURE — 36415 COLL VENOUS BLD VENIPUNCTURE: CPT | Performed by: FAMILY MEDICINE

## 2017-05-16 ASSESSMENT — ANXIETY QUESTIONNAIRES
IF YOU CHECKED OFF ANY PROBLEMS ON THIS QUESTIONNAIRE, HOW DIFFICULT HAVE THESE PROBLEMS MADE IT FOR YOU TO DO YOUR WORK, TAKE CARE OF THINGS AT HOME, OR GET ALONG WITH OTHER PEOPLE: NOT DIFFICULT AT ALL
5. BEING SO RESTLESS THAT IT IS HARD TO SIT STILL: NOT AT ALL
6. BECOMING EASILY ANNOYED OR IRRITABLE: NOT AT ALL
3. WORRYING TOO MUCH ABOUT DIFFERENT THINGS: NOT AT ALL
GAD7 TOTAL SCORE: 0
2. NOT BEING ABLE TO STOP OR CONTROL WORRYING: NOT AT ALL
7. FEELING AFRAID AS IF SOMETHING AWFUL MIGHT HAPPEN: NOT AT ALL
1. FEELING NERVOUS, ANXIOUS, OR ON EDGE: NOT AT ALL

## 2017-05-16 ASSESSMENT — PAIN SCALES - GENERAL: PAINLEVEL: NO PAIN (0)

## 2017-05-16 ASSESSMENT — PATIENT HEALTH QUESTIONNAIRE - PHQ9: 5. POOR APPETITE OR OVEREATING: NOT AT ALL

## 2017-05-16 NOTE — NURSING NOTE
"Chief Complaint   Patient presents with     Lipids     Recheck labs     Imm/Inj     testosterone       Initial /70  Pulse 68  Temp 97.5  F (36.4  C)  Ht 5' 11\" (1.803 m)  Wt 219 lb (99.3 kg)  BMI 30.54 kg/m2 Estimated body mass index is 30.54 kg/(m^2) as calculated from the following:    Height as of this encounter: 5' 11\" (1.803 m).    Weight as of this encounter: 219 lb (99.3 kg).  Medication Reconciliation: complete   Rena Bradford    The following medication was given:     MEDICATION: Testosterone 200 mg  ROUTE: IM  [facility]  DOSE: 1 ml  LOT #: [id number]  :  [employer]  EXPIRATION DATE:  6/2019  NDC#: 5414-6861-11  Rena Bradford  "

## 2017-05-16 NOTE — PROGRESS NOTES
"  SUBJECTIVE:                                                    Mikey Jenkins is a 51 year old male who presents to clinic today for the following health issues:    Hyperlipidemia Follow-Up      Rate your low fat/cholesterol diet?: good    Taking statin?  Yes, no muscle aches from statin    Other lipid medications/supplements?:  None    Patient is worried about taking a statin medication, he states a family member told him they were very \"bad\"     Musculoskeletal problem/pain      Duration: months    Description  Location: bilateral knee pain    Intensity:  mild, moderate    Accompanying signs and symptoms: none    History  Previous similar problem: no   Previous evaluation:  none    Precipitating or alleviating factors:  Trauma or overuse: YES - overuse  Aggravating factors include: walking, climbing stairs and jogging    Therapies tried and outcome: nothing    [facility].     Patient is also due for testosterone injection.    Problem list and histories reviewed & adjusted, as indicated.  Additional history: as documented    Patient Active Problem List   Diagnosis     Hearing loss     Gout, chronic     ACP (advance care planning)     Hyperlipidemia, unspecified hyperlipidemia type     Testicular hypofunction     Past Surgical History:   Procedure Laterality Date     ankle open reduction internal fixation      RT     C ORAL SURGERY PROCEDURE Left     wisdom tooth scaped      circumcision       HERNIA REPAIR      umbilical     inguinal hernia repair  1987     ORTHOPEDIC SURGERY      right ankle     vasectomy         Social History   Substance Use Topics     Smoking status: Never Smoker     Smokeless tobacco: Never Used     Alcohol use Yes      Comment: 4 beers, weekly     Family History   Problem Relation Age of Onset     C.A.D. Father      Dementia Father      Arthritis Brother      gout         Current Outpatient Prescriptions   Medication Sig " "Dispense Refill     testosterone cypionate (DEPOTESTOTERONE CYPIONATE) 200 MG/ML injection Inject 1 mL (200 mg) into the muscle every 14 days 1 mL 5     sildenafil (REVATIO/VIAGRA) 50 MG cap/tab Take 1/2 to 1 tablet daily as needed 6 tablet 11     allopurinol (ZYLOPRIM) 300 MG tablet Take 1 tablet (300 mg) by mouth daily 90 tablet 0     atorvastatin (LIPITOR) 40 MG tablet Take 1 tablet (40 mg) by mouth daily 90 tablet 1     colchicine (COLCRYS) 0.6 MG tablet Take 2 tablets at the first sign of flare, take 1 additional tablet one hour later. 9 tablet 0     indomethacin (INDOCIN) 25 MG capsule Take 1-2 capsules (25-50 mg) by mouth 3 times daily as needed (gout) 30 capsule 2     ibuprofen (ADVIL,MOTRIN) 200 MG tablet Take 3 tablets by mouth every 12 hours as needed. With food       No Known Allergies    Reviewed and updated as needed this visit by clinical staff  Tobacco  Allergies  Meds  Med Hx  Surg Hx  Fam Hx  Soc Hx      Reviewed and updated as needed this visit by Provider         ROS:  Constitutional, HEENT, cardiovascular, pulmonary, gi and gu systems are negative, except as otherwise noted.    OBJECTIVE:                                                    /70  Pulse 68  Temp 97.5  F (36.4  C)  Ht 5' 11\" (1.803 m)  Wt 219 lb (99.3 kg)  BMI 30.54 kg/m2  Body mass index is 30.54 kg/(m^2).  GENERAL: healthy, alert and no distress  PSYCH: mentation appears normal, affect normal/bright    Diagnostic Test Results:  none      ASSESSMENT/PLAN:                                                    Hyperlipidemia; controlled   Plan:  Labs:   Lipid and ALT          ICD-10-CM    1. Hyperlipidemia, unspecified hyperlipidemia type E78.5 Lipid Profile     ALT   2. Testicular hypofunction E29.1 INJECTION INTRAMUSCULAR OR SUB-Q     C TESTOSTERONE CYPIONATE INJECTION, 1 MG   3. Chronic pain of both knees M25.561 ORTHOPEDICS ADULT REFERRAL    M25.562     G89.29        FUTURE APPOINTMENTS:       - Follow-up visit in 6 " months, sooner as needed.    Anai Le MD  [facility]

## 2017-05-16 NOTE — MR AVS SNAPSHOT
After Visit Summary   5/16/2017    Mikey Jenkins    MRN: [id number]           Patient Information     Date Of Birth          1965        Visit Information        Provider Department      5/16/2017 11:00 AM Anai Le MD [facility]        Today's Diagnoses     Hyperlipidemia, unspecified hyperlipidemia type    -  1    Testicular hypofunction        Chronic pain of both knees           Follow-ups after your visit        Additional Services     ORTHOPEDICS ADULT REFERRAL       Your provider has referred you to: [facility] [place]    Please be aware that coverage of these services is subject to the terms and limitations of your health insurance plan.  Call member services at your health plan with any benefit or coverage questions.      Please bring the following to your appointment:    >>   Any x-rays, CTs or MRIs which have been performed.  Contact the facility where they were done to arrange for  prior to your scheduled appointment.    >>   List of current medications   >>   This referral request   >>   Any documents/labs given to you for this referral                  Follow-up notes from your care team     Return in about 6 months (around 11/16/2017).      Your next 10 appointments already scheduled     May 30, 2017 11:15 AM CDT   (Arrive by 11:00 AM)   Nurse Only with [facility] NURSE   [facility]    [address] [facility] [address]   [phone]            Jun 06, 2017  8:00 AM CDT   LAB with MT LAB   [facility]    [address] [facility] [address]   [phone]            Jun 13, 2017 11:00 AM CDT   (Arrive by 10:45 AM)   Return Visit with Berna Rey NP   [facility] ([facility]    [address] [place] [address]   [phone]              Who to contact     If you have questions or need follow up information about  "today's clinic visit or your schedule please contact [facility] directly at [phone].  Normal or non-critical lab and imaging results will be communicated to you by MyChart, letter or phone within 4 business days after the clinic has received the results. If you do not hear from us within 7 days, please contact the clinic through [employer] or phone. If you have a critical or abnormal lab result, we will notify you by phone as soon as possible.  Submit refill requests through [employer] or call your pharmacy and they will forward the refill request to us. Please allow 3 business days for your refill to be completed.          Additional Information About Your Visit        MyChart Information     [employer] lets you send messages to your doctor, view your test results, renew your prescriptions, schedule appointments and more. To sign up, go to [email or web address] . Click on \"Log in\" on the left side of the screen, which will take you to the Welcome page. Then click on \"Sign up Now\" on the right side of the page.     You will be asked to enter the access code listed below, as well as some personal information. Please follow the directions to create your username and password.     Your access code is: [email or web address]  Expires: 2017  1:22 PM     Your access code will  in 90 days. If you need help or a new code, please call your [place] clinic or [phone].        Care EveryWhere ID     This is your Care EveryWhere ID. This could be used by other organizations to access your [place] medical records  [id number]        Your Vitals Were     Pulse Temperature Height BMI (Body Mass Index)          68 97.5  F (36.4  C) 5' 11\" (1.803 m) 30.54 kg/m2         Blood Pressure from Last 3 Encounters:   17 110/70   17 110/80   16 120/80    Weight from Last 3 Encounters:   17 219 lb (99.3 kg)   17 213 lb (96.6 kg)   16 215 lb (97.5 kg)              We Performed the " Following     ALT     C TESTOSTERONE CYPIONATE INJECTION, 1 MG     INJECTION INTRAMUSCULAR OR SUB-Q     Lipid Profile     ORTHOPEDICS ADULT REFERRAL        Primary Care Provider Office Phone # Fax #    Anai Le [phone]       [facility] [address] [facility] [address]        Thank you!     Thank you for choosing [facility]  for your care. Our goal is always to provide you with excellent care. Hearing back from our patients is one way we can continue to improve our services. Please take a few minutes to complete the written survey that you may receive in the mail after your visit with us. Thank you!             Your Updated Medication List - Protect others around you: Learn how to safely use, store and throw away your medicines at www.disposemymeds.org.          This list is accurate as of: 5/16/17 12:06 PM.  Always use your most recent med list.                   Brand Name Dispense Instructions for use    allopurinol 300 MG tablet    ZYLOPRIM    90 tablet    Take 1 tablet (300 mg) by mouth daily       atorvastatin 40 MG tablet    LIPITOR    90 tablet    Take 1 tablet (40 mg) by mouth daily       colchicine 0.6 MG tablet    COLCRYS    9 tablet    Take 2 tablets at the first sign of flare, take 1 additional tablet one hour later.       ibuprofen 200 MG tablet    ADVIL/MOTRIN     Take 3 tablets by mouth every 12 hours as needed. With food       indomethacin 25 MG capsule    INDOCIN    30 capsule    Take 1-2 capsules (25-50 mg) by mouth 3 times daily as needed (gout)       sildenafil 50 MG cap/tab    REVATIO/VIAGRA    6 tablet    Take 1/2 to 1 tablet daily as needed       testosterone cypionate 200 MG/ML injection    DEPOTESTOTERONE    1 mL    Inject 1 mL (200 mg) into the muscle every 14 days

## 2017-05-17 ASSESSMENT — PATIENT HEALTH QUESTIONNAIRE - PHQ9: SUM OF ALL RESPONSES TO PHQ QUESTIONS 1-9: 0

## 2017-05-17 ASSESSMENT — ANXIETY QUESTIONNAIRES: GAD7 TOTAL SCORE: 0

## 2017-05-31 ENCOUNTER — OFFICE VISIT (OUTPATIENT)
Dept: CARE COORDINATION | Facility: OTHER | Age: 52
End: 2017-05-31
Attending: FAMILY MEDICINE
Payer: COMMERCIAL

## 2017-05-31 ENCOUNTER — ALLIED HEALTH/NURSE VISIT (OUTPATIENT)
Dept: FAMILY MEDICINE | Facility: OTHER | Age: 52
End: 2017-05-31
Attending: NURSE PRACTITIONER
Payer: COMMERCIAL

## 2017-05-31 DIAGNOSIS — M25.562 ARTHRALGIA OF BOTH KNEES: Primary | ICD-10-CM

## 2017-05-31 DIAGNOSIS — E29.1 TESTICULAR HYPOFUNCTION: Primary | ICD-10-CM

## 2017-05-31 DIAGNOSIS — M25.561 ARTHRALGIA OF BOTH KNEES: Primary | ICD-10-CM

## 2017-05-31 PROCEDURE — 73560 X-RAY EXAM OF KNEE 1 OR 2: CPT | Mod: TC | Performed by: RADIOLOGY

## 2017-05-31 PROCEDURE — 96372 THER/PROPH/DIAG INJ SC/IM: CPT

## 2017-05-31 NOTE — MR AVS SNAPSHOT
After Visit Summary   5/31/2017    Mikey Jenkins    MRN: [id number]           Patient Information     Date Of Birth          1965        Visit Information        Provider Department      5/31/2017 1:30 PM MT FP NURSE [facility]        Today's Diagnoses     Testicular hypofunction    -  1       Follow-ups after your visit        Your next 10 appointments already scheduled     May 31, 2017  1:45 PM CDT   Xray with MT XRAY   [facility]    [address]  [place] [address]   [phone]            May 31, 2017  2:00 PM CDT   (Arrive by 1:45 PM)   New Visit with Curtis Chow MD   [facility] ([facility] )    [address] [facility] [address]   [phone]            Jun 06, 2017  8:00 AM CDT   LAB with MT LAB   [facility]    [address]  [place] [address]   [phone]            Jun 13, 2017 11:00 AM CDT   (Arrive by 10:45 AM)   Return Visit with Berna Rey NP   [facility] [place] ([facility] [place] [facility]    [address]  [place] [address]   [phone]              Who to contact     If you have questions or need follow up information about today's clinic visit or your schedule please contact [facility] directly at [phone].  Normal or non-critical lab and imaging results will be communicated to you by MyChart, letter or phone within 4 business days after the clinic has received the results. If you do not hear from us within 7 days, please contact the clinic through MyChart or phone. If you have a critical or abnormal lab result, we will notify you by phone as soon as possible.  Submit refill requests through [employer] or call your pharmacy and they will forward the refill request to us. Please allow 3 business days for your refill to be completed.          Additional Information About Your Visit       "  MyChart Information     [employer] lets you send messages to your doctor, view your test results, renew your prescriptions, schedule appointments and more. To sign up, go to [email or web address] . Click on \"Log in\" on the left side of the screen, which will take you to the Welcome page. Then click on \"Sign up Now\" on the right side of the page.     You will be asked to enter the access code listed below, as well as some personal information. Please follow the directions to create your username and password.     Your access code is: [email or web address]  Expires: 2017  1:22 PM     Your access code will  in 90 days. If you need help or a new code, please call your [place] clinic or [phone].        Care EveryWhere ID     This is your Care EveryWhere ID. This could be used by other organizations to access your [place] medical records  [id number]         Blood Pressure from Last 3 Encounters:   17 110/70   17 110/80   16 120/80    Weight from Last 3 Encounters:   17 219 lb (99.3 kg)   17 213 lb (96.6 kg)   16 215 lb (97.5 kg)              We Performed the Following     INJECTION INTRAMUSCULAR OR SUB-Q        Primary Care Provider Office Phone # Fax #    Anai Le [phone]       [address] [address]        Thank you!     Thank you for choosing [facility]  for your care. Our goal is always to provide you with excellent care. Hearing back from our patients is one way we can continue to improve our services. Please take a few minutes to complete the written survey that you may receive in the mail after your visit with us. Thank you!             Your Updated Medication List - Protect others around you: Learn how to safely use, store and throw away your medicines at www.disposemymeds.org.          This list is accurate as of: 17  1:42 PM.  Always use your most recent med list.    "                Brand Name Dispense Instructions for use    allopurinol 300 MG tablet    ZYLOPRIM    90 tablet    Take 1 tablet (300 mg) by mouth daily       atorvastatin 40 MG tablet    LIPITOR    90 tablet    Take 1 tablet (40 mg) by mouth daily       colchicine 0.6 MG tablet    COLCRYS    9 tablet    Take 2 tablets at the first sign of flare, take 1 additional tablet one hour later.       ibuprofen 200 MG tablet    ADVIL/MOTRIN     Take 3 tablets by mouth every 12 hours as needed. With food       indomethacin 25 MG capsule    INDOCIN    30 capsule    Take 1-2 capsules (25-50 mg) by mouth 3 times daily as needed (gout)       sildenafil 50 MG cap/tab    REVATIO/VIAGRA    6 tablet    Take 1/2 to 1 tablet daily as needed       testosterone cypionate 200 MG/ML injection    DEPOTESTOTERONE    1 mL    Inject 1 mL (200 mg) into the muscle every 14 days

## 2017-05-31 NOTE — PROGRESS NOTES
The following medication was given:     MEDICATION: Depo Testosterone  200 mg  ROUTE: IM  SITE: [facility]  DOSE: 200mg/1ML  LOT #: [id number]  :  [employer]  EXPIRATION DATE:    NDC#: 2754-5391-48  Chiara Carpio

## 2017-05-31 NOTE — NURSING NOTE
The following medication was given:     MEDICATION: Depo Testosterone  200 mg  ROUTE: IM  SITE: [facility]  DOSE: 200mg/1ML  LOT #: [id number]  :  [employer]  EXPIRATION DATE:    NDC#: 7570-7939-13  Chiara Carpio

## 2017-06-13 ENCOUNTER — ALLIED HEALTH/NURSE VISIT (OUTPATIENT)
Dept: FAMILY MEDICINE | Facility: OTHER | Age: 52
End: 2017-06-13
Attending: NURSE PRACTITIONER
Payer: COMMERCIAL

## 2017-06-13 ENCOUNTER — TELEPHONE (OUTPATIENT)
Dept: UROLOGY | Facility: OTHER | Age: 52
End: 2017-06-13

## 2017-06-13 DIAGNOSIS — E29.1 TESTICULAR HYPOFUNCTION: Primary | ICD-10-CM

## 2017-06-13 NOTE — MR AVS SNAPSHOT
"              After Visit Summary   6/13/2017    Mikey Jenkins    MRN: [id number]           Patient Information     Date Of Birth          1965        Visit Information        Provider Department      6/13/2017 9:30 AM [facility] NURSE [facility]        Today's Diagnoses     Testicular hypofunction    -  1       Follow-ups after your visit        Your next 10 appointments already scheduled     Jun 28, 2017 10:30 AM CDT   (Arrive by 10:15 AM)   Return Visit with Berna Rey NP   [facility] ([facility] )    [address] [place] [address]   [phone]              Who to contact     If you have questions or need follow up information about today's clinic visit or your schedule please contact [facility] directly at [phone].  Normal or non-critical lab and imaging results will be communicated to you by MyChart, letter or phone within 4 business days after the clinic has received the results. If you do not hear from us within 7 days, please contact the clinic through MyChart or phone. If you have a critical or abnormal lab result, we will notify you by phone as soon as possible.  Submit refill requests through BigBad or call your pharmacy and they will forward the refill request to us. Please allow 3 business days for your refill to be completed.          Additional Information About Your Visit        MyChart Information     BigBad lets you send messages to your doctor, view your test results, renew your prescriptions, schedule appointments and more. To sign up, go to [email or web address] . Click on \"Log in\" on the left side of the screen, which will take you to the Welcome page. Then click on \"Sign up Now\" on the right side of the page.     You will be asked to enter the access code listed below, as well as some personal information. Please follow the directions to create your username and password.     Your access code is: " [email or web address]  Expires: 2017  1:22 PM     Your access code will  in 90 days. If you need help or a new code, please call your [place] clinic or [phone].        Care EveryWhere ID     This is your Care EveryWhere ID. This could be used by other organizations to access your [place] medical records  [id number]         Blood Pressure from Last 3 Encounters:   17 110/70   17 110/80   16 120/80    Weight from Last 3 Encounters:   17 219 lb (99.3 kg)   17 213 lb (96.6 kg)   16 215 lb (97.5 kg)              Today, you had the following     No orders found for display       Primary Care Provider Office Phone # Fax #    Anai Le [phone]       [facility] [address] [place] [address]        Thank you!     Thank you for choosing [facility]  for your care. Our goal is always to provide you with excellent care. Hearing back from our patients is one way we can continue to improve our services. Please take a few minutes to complete the written survey that you may receive in the mail after your visit with us. Thank you!             Your Updated Medication List - Protect others around you: Learn how to safely use, store and throw away your medicines at www.disposemymeds.org.          This list is accurate as of: 17  9:58 AM.  Always use your most recent med list.                   Brand Name Dispense Instructions for use    allopurinol 300 MG tablet    ZYLOPRIM    90 tablet    Take 1 tablet (300 mg) by mouth daily       atorvastatin 40 MG tablet    LIPITOR    90 tablet    Take 1 tablet (40 mg) by mouth daily       colchicine 0.6 MG tablet    COLCRYS    9 tablet    Take 2 tablets at the first sign of flare, take 1 additional tablet one hour later.       ibuprofen 200 MG tablet    ADVIL/MOTRIN     Take 800 mg by mouth 3 times daily as needed for mild pain (RX given by Dr Chow at appt #90 with 1 refill)  With food       indomethacin 25 MG capsule    INDOCIN    30 capsule    Take 1-2 capsules (25-50 mg) by mouth 3 times daily as needed (gout)       sildenafil 50 MG cap/tab    REVATIO/VIAGRA    6 tablet    Take 1/2 to 1 tablet daily as needed       testosterone cypionate 200 MG/ML injection    DEPOTESTOTERONE    1 mL    Inject 1 mL (200 mg) into the muscle every 14 days

## 2017-06-13 NOTE — TELEPHONE ENCOUNTER
Please put lab order in.  Patient is going to do labs in [facility] on 6-21-17.  He is having his testosterone shot on 6-13-17.  Has a follow up appointment on 6-28-17.

## 2017-06-14 ENCOUNTER — ALLIED HEALTH/NURSE VISIT (OUTPATIENT)
Dept: FAMILY MEDICINE | Facility: OTHER | Age: 52
End: 2017-06-14
Attending: FAMILY MEDICINE
Payer: COMMERCIAL

## 2017-06-14 DIAGNOSIS — E29.1 TESTICULAR INSUFFICIENCY: Primary | ICD-10-CM

## 2017-06-14 PROCEDURE — 96372 THER/PROPH/DIAG INJ SC/IM: CPT

## 2017-06-14 NOTE — NURSING NOTE
The following medication was given:     MEDICATION: Testosterone 200mg/ml  ROUTE: IM  SITE: [facility]  DOSE: 200mg  LOT #: [id number]  :  [employer]  EXPIRATION DATE:  07/2019  NDC#: 0344-1834-26  Next due in 14 days    Susan Monae

## 2017-06-14 NOTE — MR AVS SNAPSHOT
"              After Visit Summary   6/14/2017    Mikey Jenkins    MRN: [id number]           Patient Information     Date Of Birth          1965        Visit Information        Provider Department      6/14/2017 10:00 AM [facility] NURSE [facility]        Today's Diagnoses     Testicular insufficiency    -  1       Follow-ups after your visit        Your next 10 appointments already scheduled     Jun 28, 2017 10:30 AM CDT   (Arrive by 10:15 AM)   Return Visit with Berna Rey NP   [facility] ([facility] )    [address] [place] [address]   [phone]              Who to contact     If you have questions or need follow up information about today's clinic visit or your schedule please contact [facility] directly at [phone].  Normal or non-critical lab and imaging results will be communicated to you by MyChart, letter or phone within 4 business days after the clinic has received the results. If you do not hear from us within 7 days, please contact the clinic through MyChart or phone. If you have a critical or abnormal lab result, we will notify you by phone as soon as possible.  Submit refill requests through Perfect Channel or call your pharmacy and they will forward the refill request to us. Please allow 3 business days for your refill to be completed.          Additional Information About Your Visit        MyChart Information     Perfect Channel lets you send messages to your doctor, view your test results, renew your prescriptions, schedule appointments and more. To sign up, go to [email or web address] . Click on \"Log in\" on the left side of the screen, which will take you to the Welcome page. Then click on \"Sign up Now\" on the right side of the page.     You will be asked to enter the access code listed below, as well as some personal information. Please follow the directions to create your username and password.     Your access code is: " [email or web address]  Expires: 2017  1:22 PM     Your access code will  in 90 days. If you need help or a new code, please call your [place] clinic or [phone].        Care EveryWhere ID     This is your Care EveryWhere ID. This could be used by other organizations to access your [place] medical records  [id number]         Blood Pressure from Last 3 Encounters:   17 110/70   17 110/80   16 120/80    Weight from Last 3 Encounters:   17 219 lb (99.3 kg)   17 213 lb (96.6 kg)   16 215 lb (97.5 kg)              We Performed the Following     C TESTOSTERONE CYPIONATE INJECTION, 1 MG     INJECTION INTRAMUSCULAR OR SUB-Q        Primary Care Provider Office Phone # Fax #    Anai Le [phone]       [facility] [address] [facility] [address]        Thank you!     Thank you for choosing [facility]  for your care. Our goal is always to provide you with excellent care. Hearing back from our patients is one way we can continue to improve our services. Please take a few minutes to complete the written survey that you may receive in the mail after your visit with us. Thank you!             Your Updated Medication List - Protect others around you: Learn how to safely use, store and throw away your medicines at www.disposemymeds.org.          This list is accurate as of: 17 10:06 AM.  Always use your most recent med list.                   Brand Name Dispense Instructions for use    allopurinol 300 MG tablet    ZYLOPRIM    90 tablet    Take 1 tablet (300 mg) by mouth daily       atorvastatin 40 MG tablet    LIPITOR    90 tablet    Take 1 tablet (40 mg) by mouth daily       colchicine 0.6 MG tablet    COLCRYS    9 tablet    Take 2 tablets at the first sign of flare, take 1 additional tablet one hour later.       ibuprofen 200 MG tablet    ADVIL/MOTRIN     Take 800 mg by mouth 3 times daily as needed for mild pain  (RX given by Dr Chow at appt #90 with 1 refill) With food       indomethacin 25 MG capsule    INDOCIN    30 capsule    Take 1-2 capsules (25-50 mg) by mouth 3 times daily as needed (gout)       sildenafil 50 MG cap/tab    REVATIO/VIAGRA    6 tablet    Take 1/2 to 1 tablet daily as needed       testosterone cypionate 200 MG/ML injection    DEPOTESTOTERONE    1 mL    Inject 1 mL (200 mg) into the muscle every 14 days

## 2017-06-14 NOTE — PROGRESS NOTES
The following medication was given:     MEDICATION: Testosterone 200mg/ml  ROUTE: IM  SITE: [facility]  DOSE: 200mg  LOT #: [id number]  :  [employer]  EXPIRATION DATE:  07/2019  NDC#: 1052-2559-26  Next due in 14 days    Susan Monae

## 2017-06-20 DIAGNOSIS — E78.5 HYPERLIPIDEMIA, UNSPECIFIED HYPERLIPIDEMIA TYPE: ICD-10-CM

## 2017-06-22 DIAGNOSIS — E29.1 TESTICULAR HYPOFUNCTION: ICD-10-CM

## 2017-06-22 LAB — HCT VFR BLD AUTO: 51.5 % (ref 40–53)

## 2017-06-22 PROCEDURE — 84403 ASSAY OF TOTAL TESTOSTERONE: CPT | Mod: 90 | Performed by: NURSE PRACTITIONER

## 2017-06-22 PROCEDURE — 84270 ASSAY OF SEX HORMONE GLOBUL: CPT | Mod: 90 | Performed by: NURSE PRACTITIONER

## 2017-06-22 PROCEDURE — 99000 SPECIMEN HANDLING OFFICE-LAB: CPT | Performed by: NURSE PRACTITIONER

## 2017-06-22 PROCEDURE — 85014 HEMATOCRIT: CPT | Performed by: NURSE PRACTITIONER

## 2017-06-22 PROCEDURE — 36415 COLL VENOUS BLD VENIPUNCTURE: CPT | Performed by: NURSE PRACTITIONER

## 2017-06-22 RX ORDER — ATORVASTATIN CALCIUM 40 MG/1
TABLET, FILM COATED ORAL
Qty: 90 TABLET | Refills: 1 | Status: SHIPPED | OUTPATIENT
Start: 2017-06-22 | End: 2017-12-31

## 2017-06-27 LAB
SHBG SERPL-SCNC: 23 NMOL/L (ref 11–80)
TESTOST FREE SERPL-MCNC: 21.84 NG/DL (ref 4.7–24.4)
TESTOST SERPL-MCNC: 798 NG/DL (ref 240–950)

## 2017-06-28 ENCOUNTER — OFFICE VISIT (OUTPATIENT)
Dept: UROLOGY | Facility: OTHER | Age: 52
End: 2017-06-28
Attending: NURSE PRACTITIONER
Payer: COMMERCIAL

## 2017-06-28 VITALS
OXYGEN SATURATION: 98 % | HEIGHT: 71 IN | RESPIRATION RATE: 16 BRPM | SYSTOLIC BLOOD PRESSURE: 124 MMHG | HEART RATE: 63 BPM | WEIGHT: 215 LBS | BODY MASS INDEX: 30.1 KG/M2 | TEMPERATURE: 96.9 F | DIASTOLIC BLOOD PRESSURE: 68 MMHG

## 2017-06-28 DIAGNOSIS — M10.00 IDIOPATHIC GOUT, UNSPECIFIED CHRONICITY, UNSPECIFIED SITE: ICD-10-CM

## 2017-06-28 DIAGNOSIS — E29.1 HYPOGONADISM MALE: Primary | ICD-10-CM

## 2017-06-28 PROCEDURE — 99213 OFFICE O/P EST LOW 20 MIN: CPT | Mod: 25 | Performed by: NURSE PRACTITIONER

## 2017-06-28 ASSESSMENT — PAIN SCALES - GENERAL: PAINLEVEL: NO PAIN (0)

## 2017-06-28 NOTE — NURSING NOTE
MEDICATION: Testosterone 200 mg  ROUTE: IM  SITE: [facility]  DOSE: 200 mg/ 1 mL  LOT #: [id number]  :  [employer]  EXPIRATION DATE:  07/2019  NDC#: 4095-9590-37  SHAHZAD MANDEL

## 2017-06-28 NOTE — PROGRESS NOTES
CC: Follow up for a low testosterone level.    HPI: Mr. Mikey Jenkins is a very pleasant 51 year old male seen today in the urology clinic for follow up regarding a low testosterone level.  I last saw him on 3/21/17.  He has been getting testosterone via IM, 200 mg every 14 days.   His last testosterone level was 798, drawn on 6/22/17 and his hct is 51.5.  His previous level was 290, drawn on 11/7/16.  His prostate specific antigen is 1.71 (11/22/16).    The primary symptoms the patient has in regards to his testicular hypofunction are lack of energy and poor libido.  He feels like the testosterone is helping to improve these symptoms.      He is also pleased with the sildenafil.    Review Of Systems  Skin: negative  Eyes: negative  Ears/Nose/Throat: negative  Respiratory: No shortness of breath, dyspnea on exertion, cough, or hemoptysis.  Sleep apnea.  Cardiovascular: negative  Gastrointestinal: negative  Genitourinary: negative  Musculoskeletal: gout  Neurologic: negative  Psychiatric: negative  Hematologic/Lymphatic/Immunologic: negative  Endocrine: negative    Current Outpatient Prescriptions   Medication     atorvastatin (LIPITOR) 40 MG tablet     testosterone cypionate (DEPOTESTOTERONE CYPIONATE) 200 MG/ML injection     sildenafil (REVATIO/VIAGRA) 50 MG cap/tab     allopurinol (ZYLOPRIM) 300 MG tablet     colchicine (COLCRYS) 0.6 MG tablet     indomethacin (INDOCIN) 25 MG capsule     ibuprofen (ADVIL,MOTRIN) 200 MG tablet     No current facility-administered medications for this visit.      No Known Allergies    Past Surgical History:   Procedure Laterality Date     ankle open reduction internal fixation      RT     C ORAL SURGERY PROCEDURE Left     wisdom tooth scaped      circumcision       HERNIA REPAIR      umbilical     inguinal hernia repair  1987     ORTHOPEDIC SURGERY      right ankle     vasectomy       Past Medical History:   Diagnosis Date     Gout, unspecified 12/3/2010     Unspecified hearing  "loss 12/3/2010     Social History     Social History     Marital status:      Spouse name: N/A     Number of children: N/A     Years of education: N/A     Occupational History     Not on file.     Social History Main Topics     Smoking status: Never Smoker     Smokeless tobacco: Never Used     Alcohol use Yes      Comment: 4 beers, weekly     Drug use: No     Sexual activity: Not on file     Other Topics Concern     Blood Transfusions Yes     Caffeine Concern Yes     Coffe, soda; 32 oz daily     Exercise Yes     walking     Parent/Sibling W/ Cabg, Mi Or Angioplasty Before 65f 55m? No     Social History Narrative     Family History   Problem Relation Age of Onset     C.A.D. Father      Dementia Father      Arthritis Brother      gout     PHYSICAL EXAM:   /68  Pulse 63  Temp 96.9  F (36.1  C) (Tympanic)  Resp 16  Ht 1.803 m (5' 11\")  Wt 97.5 kg (215 lb)  SpO2 98%  BMI 29.99 kg/m2  GENERAL: Well groomed, well developed, well nourished male in no obvious distress.  RESP: No increased respiratory effort. Lungs clear to auscultation bilateral.  CV: RRR, no murmurs/rubs/gallops.  LYMPH: No LE edema.  ABD: Soft, non-tender, non-distended, no palpable masses.  Normoactive BS.  No CVA tenderness bilaterally.  MS: Full ROM in extremities.  ANGELY:  Deferred, done at 3/21/17 visit.  NEURO: Alert and oriented x 3.  PSYCH: Normal mood and affect, pleasant and agreeable during interview and exam.    ASSESSMENT/PLAN:  Mr. Mikey Jenkins is a very pleasant 51 year old male with a history of hypogonadism with symptoms of fatigue, excessive sleepiness and greatly decreased libido.  He will receive a dose of testosterone, 200 mg today.  He will continue to come to the clinic for his injections.    He has tried sildenafil for his erectile dysfunction issues, and it helped.   He will have labs done again in September, and will see me again in the office in March 2018.      I have enjoyed participating in the medical care " of this very pleasant patient.  Using layterms, and diagrams when needed, I explained the pathophysiology, usual course, potential complications, recommended monitoring, preventive modalities, treatment rationale, risks, and benefits to Mr. Mikey Jenkins. The patient appeared to understand and all questions were satisfactorily answered.      Berna Rey  [facility], CWOCN  Urology and Wound Care  June 28, 2017

## 2017-06-28 NOTE — PATIENT INSTRUCTIONS
We will give you an injection today.  Please have your labs done again sometime in September.  Remember to have your labs done before 10:00 am and mid way between injections.

## 2017-06-28 NOTE — MR AVS SNAPSHOT
"              After Visit Summary   6/28/2017    Mikey Jenkins    MRN: [id number]           Patient Information     Date Of Birth          1965        Visit Information        Provider Department      6/28/2017 10:30 AM [facility] Rey [facility]        Today's Diagnoses     Hypogonadism male    -  1      Care Instructions    We will give you an injection today.  Please have your labs done again sometime in September.  Remember to have your labs done before 10:00 am and mid way between injections.          Follow-ups after your visit        Future tests that were ordered for you today     Open Future Orders        Priority Expected Expires Ordered    Testosterone total Routine 9/1/2017 9/30/2017 6/28/2017    Hematocrit Routine 9/1/2017 9/30/2017 6/28/2017            Who to contact     If you have questions or need follow up information about today's clinic visit or your schedule please contact [facility] directly at [phone].  Normal or non-critical lab and imaging results will be communicated to you by MyChart, letter or phone within 4 business days after the clinic has received the results. If you do not hear from us within 7 days, please contact the clinic through [email or web address] or phone. If you have a critical or abnormal lab result, we will notify you by phone as soon as possible.  Submit refill requests through Hot Hotels or call your pharmacy and they will forward the refill request to us. Please allow 3 business days for your refill to be completed.          Additional Information About Your Visit        CFO.comhart Information     Hot Hotels lets you send messages to your doctor, view your test results, renew your prescriptions, schedule appointments and more. To sign up, go to [email or web address] . Click on \"Log in\" on the left side of the screen, which will take you to the Welcome page. Then click on \"Sign up Now\" on the right side of the page.     You will be asked to enter " "the access code listed below, as well as some personal information. Please follow the directions to create your username and password.     Your access code is: [email or web address]  Expires: 2017 10:53 AM     Your access code will  in 90 days. If you need help or a new code, please call your [place] clinic or [phone].        Care EveryWhere ID     This is your Care EveryWhere ID. This could be used by other organizations to access your [place] medical records  [id number]        Your Vitals Were     Pulse Temperature Respirations Height Pulse Oximetry BMI (Body Mass Index)    63 96.9  F (36.1  C) (Tympanic) 16 1.803 m (5' 11\") 98% 29.99 kg/m2       Blood Pressure from Last 3 Encounters:   17 124/68   17 110/70   17 110/80    Weight from Last 3 Encounters:   17 97.5 kg (215 lb)   17 99.3 kg (219 lb)   17 96.6 kg (213 lb)               Primary Care Provider Office Phone # Fax #    Anai Le [phone]       [place] [address]        Equal Access to Services     KANDI FUENTES AH: Hadii celina maier hadasho Soomaali, waaxda luqadaha, qaybta kaalmada adeegyada, maria eugenia lazar . So [facility] [phone].    ATENCIÓN: Si habla español, tiene a hayes disposición servicios gratuitos de asistencia lingüística. [facility] [phone].    We comply with applicable federal civil rights laws and [place] laws. We do not discriminate on the basis of race, color, national origin, age, disability sex, sexual orientation or gender identity.            Thank you!     [facility] you [facility]  [facility] your [facility]. Our goal is always to provide you with excellent care. Hearing back from our patients is one way we can continue to improve our services. Please take a few minutes to complete the written survey that you may receive in the mail after your visit with us. Thank you!           "   Your Updated Medication List - Protect others around you: Learn how to safely use, store and throw away your medicines at www.disposemymeds.org.          This list is accurate as of: 6/28/17 10:53 AM.  Always use your most recent med list.                   Brand Name Dispense Instructions for use Diagnosis    allopurinol 300 MG tablet    ZYLOPRIM    90 tablet    Take 1 tablet (300 mg) by mouth daily    Idiopathic gout, unspecified chronicity, unspecified site       atorvastatin 40 MG tablet    LIPITOR    90 tablet    TAKE 1 TABLET BY MOUTH EVERY DAY    Hyperlipidemia, unspecified hyperlipidemia type       colchicine 0.6 MG tablet    COLCRYS    9 tablet    Take 2 tablets at the first sign of flare, take 1 additional tablet one hour later.    Acute gout of multiple sites, unspecified cause       ibuprofen 200 MG tablet    ADVIL/MOTRIN     Take 800 mg by mouth 3 times daily as needed for mild pain (RX given by Dr Chow at appt #90 with 1 refill) With food        indomethacin 25 MG capsule    INDOCIN    30 capsule    Take 1-2 capsules (25-50 mg) by mouth 3 times daily as needed (gout)    Acute gout of multiple sites, unspecified cause       sildenafil 50 MG cap/tab    REVATIO/VIAGRA    6 tablet    Take 1/2 to 1 tablet daily as needed    Erectile dysfunction, unspecified erectile dysfunction type       testosterone cypionate 200 MG/ML injection    DEPOTESTOTERONE    1 mL    Inject 1 mL (200 mg) into the muscle every 14 days    Testicular hypofunction

## 2017-06-30 RX ORDER — ALLOPURINOL 300 MG/1
TABLET ORAL
Qty: 90 TABLET | Refills: 0 | Status: SHIPPED | OUTPATIENT
Start: 2017-06-30 | End: 2017-10-15

## 2017-06-30 NOTE — TELEPHONE ENCOUNTER
Zyloprim       Last Written Prescription Date: 2/24/17  Last Fill Quantity: 90, # refills: 0  Last Office Visit with [facility], [facility] or [facility] prescribing provider:  5/16/17        No results found for: URIC]  No results found for: CR]  No results found for: WBC  No results found for: RBC  No results found for: HGB  Lab Results   Component Value Date    HCT 51.5 06/22/2017     No components found for: MCT  No results found for: MCV  No results found for: MCH  No results found for: MCHC  No results found for: RDW  No results found for: PLT  No results found for: AST  Lab Results   Component Value Date    ALT 30 05/16/2017

## 2017-07-13 ENCOUNTER — ALLIED HEALTH/NURSE VISIT (OUTPATIENT)
Dept: FAMILY MEDICINE | Facility: OTHER | Age: 52
End: 2017-07-13
Attending: FAMILY MEDICINE
Payer: COMMERCIAL

## 2017-07-13 DIAGNOSIS — E29.1 TESTICULAR HYPOFUNCTION: Primary | ICD-10-CM

## 2017-07-13 PROCEDURE — 96372 THER/PROPH/DIAG INJ SC/IM: CPT

## 2017-07-13 NOTE — PROGRESS NOTES
The following medication was given:     MEDICATION: Testosterone 200 mg/ml  ROUTE: IM  SITE: [facility]  DOSE: 1ml  LOT #: [id number]  :  [employer]  EXPIRATION DATE:  07/2019  NDC#: 3529-3894-03  Tolerated well.  Due again in 14 days  Nohemy Malik

## 2017-07-13 NOTE — MR AVS SNAPSHOT
"              After Visit Summary   7/13/2017    Mikey Jenkins    MRN: [id number]           Patient Information     Date Of Birth          1965        Visit Information        Provider Department      7/13/2017 10:15 AM [facility] NURSE [facility]        Today's Diagnoses     Testicular hypofunction    -  1       Follow-ups after your visit        Your next 10 appointments already scheduled     Mar 20, 2018  1:00 PM CDT   (Arrive by 12:45 PM)   Return Visit with Berna Rey NP   [facility] ([facility] [place] )    [address]  [place] [address]   [phone]              Who to contact     If you have questions or need follow up information about today's clinic visit or your schedule please contact [facility] directly at [phone].  Normal or non-critical lab and imaging results will be communicated to you by MyChart, letter or phone within 4 business days after the clinic has received the results. If you do not hear from us within 7 days, please contact the clinic through MyChart or phone. If you have a critical or abnormal lab result, we will notify you by phone as soon as possible.  Submit refill requests through [employer] or call your pharmacy and they will forward the refill request to us. Please allow 3 business days for your refill to be completed.          Additional Information About Your Visit        MyChart Information     Cloudnexa lets you send messages to your doctor, view your test results, renew your prescriptions, schedule appointments and more. To sign up, go to [email or web address] . Click on \"Log in\" on the left side of the screen, which will take you to the Welcome page. Then click on \"Sign up Now\" on the right side of the page.     You will be asked to enter the access code listed below, as well as some personal information. Please follow the directions to create your username and password.     Your access code is: " [email or web address]  Expires: 2017 10:53 AM     Your access code will  in 90 days. If you need help or a new code, please call your [place] clinic or [phone].        Care EveryWhere ID     This is your Care EveryWhere ID. This could be used by other organizations to access your [facility] medical records  [id number]         Blood Pressure from Last 3 Encounters:   17 124/68   17 110/70   17 110/80    Weight from Last 3 Encounters:   17 215 lb (97.5 kg)   17 219 lb (99.3 kg)   17 213 lb (96.6 kg)              Today, you had the following     No orders found for display       Primary Care Provider Office Phone # Fax #    Anai Le [phone]       [facility] [address] [place] [address]        Equal Access to Services     KANDI FUENTES : Hadii aad ku hadasho Soomaali, waaxda luqadaha, qaybta kaalmada adeegyada, waxay idiin hayaan shelly lazar . So [facility] [phone].    ATENCIÓN: Si habla español, tiene a hayes disposición servicios gratuitos de asistencia lingüística. Llame al [phone].    We comply with applicable federal civil rights laws and [place] laws. We do not discriminate on the basis of race, color, national origin, age, disability sex, sexual orientation or gender identity.            Thank you!     Thank you for choosing [facility]  for your care. Our goal is always to provide you with excellent care. Hearing back from our patients is one way we can continue to improve our services. Please take a few minutes to complete the written survey that you may receive in the mail after your visit with us. Thank you!             Your Updated Medication List - Protect others around you: Learn how to safely use, store and throw away your medicines at www.disposemymeds.org.          This list is accurate as of: 17 11:02 AM.  Always use your most recent med list.                   Brand  Name Dispense Instructions for use Diagnosis    allopurinol 300 MG tablet    ZYLOPRIM    90 tablet    TAKE 1 TABLET(300 MG) BY MOUTH DAILY    Idiopathic gout, unspecified chronicity, unspecified site       atorvastatin 40 MG tablet    LIPITOR    90 tablet    TAKE 1 TABLET BY MOUTH EVERY DAY    Hyperlipidemia, unspecified hyperlipidemia type       colchicine 0.6 MG tablet    COLCRYS    9 tablet    Take 2 tablets at the first sign of flare, take 1 additional tablet one hour later.    Acute gout of multiple sites, unspecified cause       ibuprofen 200 MG tablet    ADVIL/MOTRIN     Take 800 mg by mouth 3 times daily as needed for mild pain (RX given by Dr Chow at appt #90 with 1 refill) With food        indomethacin 25 MG capsule    INDOCIN    30 capsule    Take 1-2 capsules (25-50 mg) by mouth 3 times daily as needed (gout)    Acute gout of multiple sites, unspecified cause       sildenafil 50 MG cap/tab    REVATIO/VIAGRA    6 tablet    Take 1/2 to 1 tablet daily as needed    Erectile dysfunction, unspecified erectile dysfunction type       testosterone cypionate 200 MG/ML injection    DEPOTESTOTERONE    1 mL    Inject 1 mL (200 mg) into the muscle every 14 days    Testicular hypofunction

## 2017-08-01 ENCOUNTER — ALLIED HEALTH/NURSE VISIT (OUTPATIENT)
Dept: FAMILY MEDICINE | Facility: OTHER | Age: 52
End: 2017-08-01
Attending: NURSE PRACTITIONER
Payer: COMMERCIAL

## 2017-08-01 DIAGNOSIS — E29.1 TESTICULAR HYPOFUNCTION: Primary | ICD-10-CM

## 2017-08-01 NOTE — NURSING NOTE
Unable to give testosterone due to lack of current order. Notified Geoffrey Rey to please reorder.

## 2017-08-01 NOTE — MR AVS SNAPSHOT
"              After Visit Summary   8/1/2017    Mikey Jenkins    MRN: [id number]           Patient Information     Date Of Birth          1965        Visit Information        Provider Department      8/1/2017 3:00 PM [facility] NURSE [facility]        Today's Diagnoses     Testicular hypofunction    -  1       Follow-ups after your visit        Your next 10 appointments already scheduled     Mar 20, 2018  1:00 PM CDT   (Arrive by 12:45 PM)   Return Visit with Berna Rey NP   [facility] ([facility] [place] )    [address]  [place] [address]   [phone]              Who to contact     If you have questions or need follow up information about today's clinic visit or your schedule please contact [facility] directly at [phone].  Normal or non-critical lab and imaging results will be communicated to you by MyChart, letter or phone within 4 business days after the clinic has received the results. If you do not hear from us within 7 days, please contact the clinic through MyChart or phone. If you have a critical or abnormal lab result, we will notify you by phone as soon as possible.  Submit refill requests through [employer] or call your pharmacy and they will forward the refill request to us. Please allow 3 business days for your refill to be completed.          Additional Information About Your Visit        MyChart Information     [employer] lets you send messages to your doctor, view your test results, renew your prescriptions, schedule appointments and more. To sign up, go to [email or web address] . Click on \"Log in\" on the left side of the screen, which will take you to the Welcome page. Then click on \"Sign up Now\" on the right side of the page.     You will be asked to enter the access code listed below, as well as some personal information. Please follow the directions to create your username and password.     Your access code is: " [email or web address]  Expires: 2017 10:53 AM     Your access code will  in 90 days. If you need help or a new code, please call your [place] clinic or [phone].        Care EveryWhere ID     This is your Care EveryWhere ID. This could be used by other organizations to access your [place] medical records  [id number]         Blood Pressure from Last 3 Encounters:   17 124/68   17 110/70   17 110/80    Weight from Last 3 Encounters:   17 215 lb (97.5 kg)   17 219 lb (99.3 kg)   17 213 lb (96.6 kg)              Today, you had the following     No orders found for display       Primary Care Provider Office Phone # Fax #    Anai Le [phone]       [facility] [address] [place] [address]        Equal Access to Services     AKNDI FUENTES : Hadii aad ku hadasho Soomaali, waaxda luqadaha, qaybta kaalmada adeegyada, waxay idiin hayaan shelly lazar . So [facility] [phone].    ATENCIÓN: Si habla español, tiene a hayes disposición servicios gratuitos de asistencia lingüística. Llame al [phone].    We comply with applicable federal civil rights laws and [place] laws. We do not discriminate on the basis of race, color, national origin, age, disability sex, sexual orientation or gender identity.            Thank you!     Thank you for choosing [facility]  for your care. Our goal is always to provide you with excellent care. Hearing back from our patients is one way we can continue to improve our services. Please take a few minutes to complete the written survey that you may receive in the mail after your visit with us. Thank you!             Your Updated Medication List - Protect others around you: Learn how to safely use, store and throw away your medicines at www.disposemymeds.org.          This list is accurate as of: 17  4:02 PM.  Always use your most recent med list.                   Brand  Name Dispense Instructions for use Diagnosis    allopurinol 300 MG tablet    ZYLOPRIM    90 tablet    TAKE 1 TABLET(300 MG) BY MOUTH DAILY    Idiopathic gout, unspecified chronicity, unspecified site       atorvastatin 40 MG tablet    LIPITOR    90 tablet    TAKE 1 TABLET BY MOUTH EVERY DAY    Hyperlipidemia, unspecified hyperlipidemia type       colchicine 0.6 MG tablet    COLCRYS    9 tablet    Take 2 tablets at the first sign of flare, take 1 additional tablet one hour later.    Acute gout of multiple sites, unspecified cause       ibuprofen 200 MG tablet    ADVIL/MOTRIN     Take 800 mg by mouth 3 times daily as needed for mild pain (RX given by Dr Chow at appt #90 with 1 refill) With food        indomethacin 25 MG capsule    INDOCIN    30 capsule    Take 1-2 capsules (25-50 mg) by mouth 3 times daily as needed (gout)    Acute gout of multiple sites, unspecified cause       sildenafil 50 MG cap/tab    REVATIO/VIAGRA    6 tablet    Take 1/2 to 1 tablet daily as needed    Erectile dysfunction, unspecified erectile dysfunction type       testosterone cypionate 200 MG/ML injection    DEPOTESTOTERONE    1 mL    Inject 1 mL (200 mg) into the muscle every 14 days    Testicular hypofunction

## 2017-08-02 ENCOUNTER — TELEPHONE (OUTPATIENT)
Dept: FAMILY MEDICINE | Facility: OTHER | Age: 52
End: 2017-08-02

## 2017-08-02 DIAGNOSIS — E29.1 TESTICULAR HYPOFUNCTION: ICD-10-CM

## 2017-08-02 NOTE — TELEPHONE ENCOUNTER
Pt's order for testosterone has , Geoffrey Rey out till next week, Zandra, nursing supervisor, asking if you can give a 1 time order to get him to the next injection, he is due in a few days.  Then Geoffrey can renew his order.

## 2017-08-03 ENCOUNTER — ALLIED HEALTH/NURSE VISIT (OUTPATIENT)
Dept: FAMILY MEDICINE | Facility: OTHER | Age: 52
End: 2017-08-03
Attending: FAMILY MEDICINE
Payer: COMMERCIAL

## 2017-08-03 DIAGNOSIS — E29.1 TESTICULAR FAILURE: Primary | ICD-10-CM

## 2017-08-03 PROCEDURE — 96372 THER/PROPH/DIAG INJ SC/IM: CPT

## 2017-08-03 RX ORDER — TESTOSTERONE CYPIONATE 200 MG/ML
200 INJECTION, SOLUTION INTRAMUSCULAR
Qty: 1 ML | Refills: 0 | Status: SHIPPED | OUTPATIENT
Start: 2017-08-03 | End: 2017-08-07

## 2017-08-03 NOTE — MR AVS SNAPSHOT
"              After Visit Summary   8/3/2017    Mikey Jenkins    MRN: [id number]           Patient Information     Date Of Birth          1965        Visit Information        Provider Department      8/3/2017 11:30 AM [facility] NURSE [facility]        Today's Diagnoses     Testicular failure    -  1       Follow-ups after your visit        Your next 10 appointments already scheduled     Mar 20, 2018  1:00 PM CDT   (Arrive by 12:45 PM)   Return Visit with Berna Rey NP   [facility] [place] )    [address] [place] [address]   [phone]              Who to contact     If you have questions or need follow up information about today's clinic visit or your schedule please contact [facility] directly at [phone].  Normal or non-critical lab and imaging results will be communicated to you by MyChart, letter or phone within 4 business days after the clinic has received the results. If you do not hear from us within 7 days, please contact the clinic through MyChart or phone. If you have a critical or abnormal lab result, we will notify you by phone as soon as possible.  Submit refill requests through [employer] or call your pharmacy and they will forward the refill request to us. Please allow 3 business days for your refill to be completed.          Additional Information About Your Visit        MyChart Information     [employer] lets you send messages to your doctor, view your test results, renew your prescriptions, schedule appointments and more. To sign up, go to [email or web address] . Click on \"Log in\" on the left side of the screen, which will take you to the Welcome page. Then click on \"Sign up Now\" on the right side of the page.     You will be asked to enter the access code listed below, as well as some personal information. Please follow the directions to create your username and password.     Your access code is: " [email or web address]  Expires: 2017 10:53 AM     Your access code will  in 90 days. If you need help or a new code, please call your [place] clinic or [phone].        Care EveryWhere ID     This is your Care EveryWhere ID. This could be used by other organizations to access your [place] medical records  [id number]         Blood Pressure from Last 3 Encounters:   17 124/68   17 110/70   17 110/80    Weight from Last 3 Encounters:   17 215 lb (97.5 kg)   17 219 lb (99.3 kg)   17 213 lb (96.6 kg)              We Performed the Following     C TESTOSTERONE CYPIONATE INJECTION, 1 MG     INJECTION INTRAMUSCULAR OR SUB-Q        Primary Care Provider Office Phone # Fax #    Anai Le [phone]       [facility] [address] [place] [address]        Equal Access to Services     KANDI FUENTES : Hadii aad ku hadasho Soomaali, waaxda luqadaha, qaybta kaalmada adeegyada, waxay marielin haykirstenn shelly lazar . So [facility] [phone].    ATENCIÓN: Si habla español, tiene a hayes disposición servicios gratuitos de asistencia lingüística. Llame al [phone].    We comply with applicable federal civil rights laws and [place] laws. We do not discriminate on the basis of race, color, national origin, age, disability sex, sexual orientation or gender identity.            Thank you!     Thank you for choosing [facility]  for your care. Our goal is always to provide you with excellent care. Hearing back from our patients is one way we can continue to improve our services. Please take a few minutes to complete the written survey that you may receive in the mail after your visit with us. Thank you!             Your Updated Medication List - Protect others around you: Learn how to safely use, store and throw away your medicines at www.disposemymeds.org.          This list is accurate as of: 8/3/17  1:12 PM.  Always use your  most recent med list.                   Brand Name Dispense Instructions for use Diagnosis    allopurinol 300 MG tablet    ZYLOPRIM    90 tablet    TAKE 1 TABLET(300 MG) BY MOUTH DAILY    Idiopathic gout, unspecified chronicity, unspecified site       atorvastatin 40 MG tablet    LIPITOR    90 tablet    TAKE 1 TABLET BY MOUTH EVERY DAY    Hyperlipidemia, unspecified hyperlipidemia type       colchicine 0.6 MG tablet    COLCRYS    9 tablet    Take 2 tablets at the first sign of flare, take 1 additional tablet one hour later.    Acute gout of multiple sites, unspecified cause       ibuprofen 200 MG tablet    ADVIL/MOTRIN     Take 800 mg by mouth 3 times daily as needed for mild pain (RX given by Dr Chow at appt #90 with 1 refill) With food        indomethacin 25 MG capsule    INDOCIN    30 capsule    Take 1-2 capsules (25-50 mg) by mouth 3 times daily as needed (gout)    Acute gout of multiple sites, unspecified cause       sildenafil 50 MG cap/tab    REVATIO/VIAGRA    6 tablet    Take 1/2 to 1 tablet daily as needed    Erectile dysfunction, unspecified erectile dysfunction type       testosterone cypionate 200 MG/ML injection    DEPOTESTOTERONE    1 mL    Inject 1 mL (200 mg) into the muscle every 14 days    Testicular hypofunction

## 2017-08-03 NOTE — PROGRESS NOTES
The following medication was given:     MEDICATION: Depo Testosterone  200 mg  ROUTE: IM  SITE: [facility]  DOSE: 200mg  LOT #: [id number]  :  [employer]  EXPIRATION DATE:  7/2019  NDC#: 4773-7408-58  Next injection due 14 days    Susan Monae

## 2017-08-03 NOTE — NURSING NOTE
The following medication was given:     MEDICATION: Depo Testosterone  200 mg  ROUTE: IM  SITE: [facility]  DOSE: 200mg  LOT #: [id number]  :  [employer]  EXPIRATION DATE:  7/2019  NDC#: 2475-0189-15  Next injection due 14 days    Susan Monae

## 2017-08-07 DIAGNOSIS — E29.1 TESTICULAR HYPOFUNCTION: ICD-10-CM

## 2017-08-07 RX ORDER — TESTOSTERONE CYPIONATE 200 MG/ML
200 INJECTION, SOLUTION INTRAMUSCULAR
Qty: 1 ML | Refills: 5 | OUTPATIENT
Start: 2017-08-07 | End: 2017-09-26

## 2017-08-18 ENCOUNTER — ALLIED HEALTH/NURSE VISIT (OUTPATIENT)
Dept: FAMILY MEDICINE | Facility: OTHER | Age: 52
End: 2017-08-18
Attending: NURSE PRACTITIONER
Payer: COMMERCIAL

## 2017-08-18 DIAGNOSIS — N52.9 ED (ERECTILE DYSFUNCTION): Primary | ICD-10-CM

## 2017-08-18 PROCEDURE — 96372 THER/PROPH/DIAG INJ SC/IM: CPT

## 2017-08-18 NOTE — NURSING NOTE
The following medication was given:     MEDICATION: Depo Testosterone  200 mg  ROUTE: IM  SITE: [facility]  DOSE: 200mg  LOT #: [id number]  :  [employer]  EXPIRATION DATE:  7/2019  NDC#: 8606-2835-58  Kaylah Weems

## 2017-08-18 NOTE — MR AVS SNAPSHOT
"              After Visit Summary   8/18/2017    Mikey Jenkins    MRN: [id number]           Patient Information     Date Of Birth          1965        Visit Information        Provider Department      8/18/2017 2:15 PM [facility] NURSE [facility]        Today's Diagnoses     ED (erectile dysfunction)    -  1       Follow-ups after your visit        Your next 10 appointments already scheduled     Mar 20, 2018  1:00 PM CDT   (Arrive by 12:45 PM)   Return Visit with Berna Rey NP   [facility] [place] ([facility] [place] )    [address]  [place] [address]   [phone]              Who to contact     If you have questions or need follow up information about today's clinic visit or your schedule please contact [facility] directly at [phone].  Normal or non-critical lab and imaging results will be communicated to you by MyChart, letter or phone within 4 business days after the clinic has received the results. If you do not hear from us within 7 days, please contact the clinic through MyChart or phone. If you have a critical or abnormal lab result, we will notify you by phone as soon as possible.  Submit refill requests through [employer] or call your pharmacy and they will forward the refill request to us. Please allow 3 business days for your refill to be completed.          Additional Information About Your Visit        MyChart Information     [employer] lets you send messages to your doctor, view your test results, renew your prescriptions, schedule appointments and more. To sign up, go to [email or web address] . Click on \"Log in\" on the left side of the screen, which will take you to the Welcome page. Then click on \"Sign up Now\" on the right side of the page.     You will be asked to enter the access code listed below, as well as some personal information. Please follow the directions to create your username and password.     Your access code is: " [email or web address]  Expires: 2017 10:53 AM     Your access code will  in 90 days. If you need help or a new code, please call your [place] clinic or [phone].        Care EveryWhere ID     This is your Care EveryWhere ID. This could be used by other organizations to access your [place] medical records  [id number]         Blood Pressure from Last 3 Encounters:   17 124/68   17 110/70   17 110/80    Weight from Last 3 Encounters:   17 215 lb (97.5 kg)   17 219 lb (99.3 kg)   17 213 lb (96.6 kg)              We Performed the Following     INJECTION INTRAMUSCULAR OR SUB-Q     TESTOSTERONE CYPIONATE INJECTION 1MG        Primary Care Provider Office Phone # Fax #    Anaianika Le [phone] [address] [facility] [address]        Equal Access to Services     [facility] : Hadii aad ku hadasho Soomaali, waaxda luqadaha, qaybta kaalmada adeegyada, waxay marielin haykirstenn shelly lazar . So [facility] [phone].    ATENCIÓN: Si habla español, tiene a hayes disposición servicios gratuitos de asistencia lingüística. Llame al [phone].    We comply with applicable federal civil rights laws and [place] laws. We do not discriminate on the basis of race, color, national origin, age, disability sex, sexual orientation or gender identity.            Thank you!     Thank you for choosing [facility]  for your care. Our goal is always to provide you with excellent care. Hearing back from our patients is one way we can continue to improve our services. Please take a few minutes to complete the written survey that you may receive in the mail after your visit with us. Thank you!             Your Updated Medication List - Protect others around you: Learn how to safely use, store and throw away your medicines at www.disposemymeds.org.          This list is accurate as of: 17  2:29 PM.  Always use your most recent med list.                    Brand Name Dispense Instructions for use Diagnosis    allopurinol 300 MG tablet    ZYLOPRIM    90 tablet    TAKE 1 TABLET(300 MG) BY MOUTH DAILY    Idiopathic gout, unspecified chronicity, unspecified site       atorvastatin 40 MG tablet    LIPITOR    90 tablet    TAKE 1 TABLET BY MOUTH EVERY DAY    Hyperlipidemia, unspecified hyperlipidemia type       colchicine 0.6 MG tablet    COLCRYS    9 tablet    Take 2 tablets at the first sign of flare, take 1 additional tablet one hour later.    Acute gout of multiple sites, unspecified cause       ibuprofen 200 MG tablet    ADVIL/MOTRIN     Take 800 mg by mouth 3 times daily as needed for mild pain (RX given by Dr Chow at appt #90 with 1 refill) With food        indomethacin 25 MG capsule    INDOCIN    30 capsule    Take 1-2 capsules (25-50 mg) by mouth 3 times daily as needed (gout)    Acute gout of multiple sites, unspecified cause       sildenafil 50 MG cap/tab    REVATIO/VIAGRA    6 tablet    Take 1/2 to 1 tablet daily as needed    Erectile dysfunction, unspecified erectile dysfunction type       testosterone cypionate 200 MG/ML injection    DEPOTESTOTERONE    1 mL    Inject 1 mL (200 mg) into the muscle every 14 days    Testicular hypofunction

## 2017-09-01 ENCOUNTER — ALLIED HEALTH/NURSE VISIT (OUTPATIENT)
Dept: FAMILY MEDICINE | Facility: OTHER | Age: 52
End: 2017-09-01
Attending: NURSE PRACTITIONER
Payer: COMMERCIAL

## 2017-09-01 DIAGNOSIS — N52.9 ED (ERECTILE DYSFUNCTION): Primary | ICD-10-CM

## 2017-09-01 PROCEDURE — 96372 THER/PROPH/DIAG INJ SC/IM: CPT

## 2017-09-01 NOTE — PROGRESS NOTES
following medication was given:     MEDICATION: Depo Testosterone  200 mg  ROUTE: IM  SITE: [facility]  DOSE: 200 mg / 1ml  LOT #: [id number]  :  [employer]  EXPIRATION DATE:  12/2019  NDC#: 4236-7397-31  Pamela M Lechevalier LPN

## 2017-09-01 NOTE — MR AVS SNAPSHOT
"              After Visit Summary   9/1/2017    Mikey Jenkins    MRN: [id number]           Patient Information     Date Of Birth          1965        Visit Information        Provider Department      9/1/2017 3:15 PM [place] [facility] NURSE [facility]        Today's Diagnoses     Counseled by nurse    -  1       Follow-ups after your visit        Your next 10 appointments already scheduled     Mar 20, 2018  1:00 PM CDT   (Arrive by 12:45 PM)   Return Visit with Berna Rey NP   [facility] ([facility] [place] )    [address]  [place] [address]   [phone]              Who to contact     If you have questions or need follow up information about today's clinic visit or your schedule please contact [facility] directly at [phone].  Normal or non-critical lab and imaging results will be communicated to you by MyChart, letter or phone within 4 business days after the clinic has received the results. If you do not hear from us within 7 days, please contact the clinic through MyChart or phone. If you have a critical or abnormal lab result, we will notify you by phone as soon as possible.  Submit refill requests through "[employer] or call your pharmacy and they will forward the refill request to us. Please allow 3 business days for your refill to be completed.          Additional Information About Your Visit        MyChart Information     "[employer] lets you send messages to your doctor, view your test results, renew your prescriptions, schedule appointments and more. To sign up, go to [email or web address] . Click on \"Log in\" on the left side of the screen, which will take you to the Welcome page. Then click on \"Sign up Now\" on the right side of the page.     You will be asked to enter the access code listed below, as well as some personal information. Please follow the directions to create your username and password.     Your access code is: " [email or web address]  Expires: 2017 10:53 AM     Your access code will  in 90 days. If you need help or a new code, please call your [place] clinic or [phone].        Care EveryWhere ID     This is your Care EveryWhere ID. This could be used by other organizations to access your [place] medical records  [id number]         Blood Pressure from Last 3 Encounters:   17 124/68   17 110/70   17 110/80    Weight from Last 3 Encounters:   17 215 lb (97.5 kg)   17 219 lb (99.3 kg)   17 213 lb (96.6 kg)              Today, you had the following     No orders found for display       Primary Care Provider Office Phone # Fax #    Anai Le [phone] [address] [facility] [address]        Equal Access to Services     RAOUL FUENTES : Hadii celina ku hadasho Soomaali, waaxda luqadaha, qaybta kaalmada adeegyada, waxay marielin haykirstenn shelly lazar . So [facility] [phone].    ATENCIÓN: Si habla español, tiene a hayes disposición servicios gratuitos de asistencia lingüística. Llame al [phone].    We comply with applicable federal civil rights laws and [place] laws. We do not discriminate on the basis of race, color, national origin, age, disability sex, sexual orientation or gender identity.            Thank you!     Thank you for choosing [facility]  for your care. Our goal is always to provide you with excellent care. Hearing back from our patients is one way we can continue to improve our services. Please take a few minutes to complete the written survey that you may receive in the mail after your visit with us. Thank you!             Your Updated Medication List - Protect others around you: Learn how to safely use, store and throw away your medicines at www.disposemymeds.org.          This list is accurate as of: 17  3:32 PM.  Always use your most recent med list.                   Brand Name Dispense  Instructions for use Diagnosis    allopurinol 300 MG tablet    ZYLOPRIM    90 tablet    TAKE 1 TABLET(300 MG) BY MOUTH DAILY    Idiopathic gout, unspecified chronicity, unspecified site       atorvastatin 40 MG tablet    LIPITOR    90 tablet    TAKE 1 TABLET BY MOUTH EVERY DAY    Hyperlipidemia, unspecified hyperlipidemia type       colchicine 0.6 MG tablet    COLCRYS    9 tablet    Take 2 tablets at the first sign of flare, take 1 additional tablet one hour later.    Acute gout of multiple sites, unspecified cause       ibuprofen 200 MG tablet    ADVIL/MOTRIN     Take 800 mg by mouth 3 times daily as needed for mild pain (RX given by Dr Chow at appt #90 with 1 refill) With food        indomethacin 25 MG capsule    INDOCIN    30 capsule    Take 1-2 capsules (25-50 mg) by mouth 3 times daily as needed (gout)    Acute gout of multiple sites, unspecified cause       sildenafil 50 MG cap/tab    REVATIO/VIAGRA    6 tablet    Take 1/2 to 1 tablet daily as needed    Erectile dysfunction, unspecified erectile dysfunction type       testosterone cypionate 200 MG/ML injection    DEPOTESTOTERONE    1 mL    Inject 1 mL (200 mg) into the muscle every 14 days    Testicular hypofunction

## 2017-09-15 ENCOUNTER — OFFICE VISIT (OUTPATIENT)
Dept: FAMILY MEDICINE | Facility: OTHER | Age: 52
End: 2017-09-15
Attending: NURSE PRACTITIONER
Payer: COMMERCIAL

## 2017-09-15 DIAGNOSIS — N52.9 ED (ERECTILE DYSFUNCTION): Primary | ICD-10-CM

## 2017-09-15 PROCEDURE — 96372 THER/PROPH/DIAG INJ SC/IM: CPT

## 2017-09-15 NOTE — MR AVS SNAPSHOT
"              After Visit Summary   9/15/2017    Mikey Jenkins    MRN: [id number]           Patient Information     Date Of Birth          1965        Visit Information        Provider Department      9/15/2017 2:45 PM [facility] NURSE [facility]        Today's Diagnoses     ED (erectile dysfunction)    -  1       Follow-ups after your visit        Your next 10 appointments already scheduled     Mar 20, 2018  1:00 PM CDT   (Arrive by 12:45 PM)   Return Visit with Berna Rey NP   [facility] [place] ([facility] [place] )    [address]  [place] [address]   [phone]              Who to contact     If you have questions or need follow up information about today's clinic visit or your schedule please contact [facility] directly at [phone].  Normal or non-critical lab and imaging results will be communicated to you by MyChart, letter or phone within 4 business days after the clinic has received the results. If you do not hear from us within 7 days, please contact the clinic through MyChart or phone. If you have a critical or abnormal lab result, we will notify you by phone as soon as possible.  Submit refill requests through [employer] or call your pharmacy and they will forward the refill request to us. Please allow 3 business days for your refill to be completed.          Additional Information About Your Visit        MyChart Information     [employer] lets you send messages to your doctor, view your test results, renew your prescriptions, schedule appointments and more. To sign up, go to [email or web address] . Click on \"Log in\" on the left side of the screen, which will take you to the Welcome page. Then click on \"Sign up Now\" on the right side of the page.     You will be asked to enter the access code listed below, as well as some personal information. Please follow the directions to create your username and password.     Your access code is: " [email or web address]  Expires: 2017 10:53 AM     Your access code will  in 90 days. If you need help or a new code, please call your [place] clinic or [phone].        Care EveryWhere ID     This is your Care EveryWhere ID. This could be used by other organizations to access your [place] medical records  [id number]         Blood Pressure from Last 3 Encounters:   17 124/68   17 110/70   17 110/80    Weight from Last 3 Encounters:   17 215 lb (97.5 kg)   17 219 lb (99.3 kg)   17 213 lb (96.6 kg)              We Performed the Following     INJECTION INTRAMUSCULAR OR SUB-Q     TESTOSTERONE CYPIONATE INJECTION 1MG        Primary Care Provider Office Phone # Fax #    Anaianika Le [phone] [address] [facility] [address]        Equal Access to Services     [facility] Center[facility]ARLEEN : Hadii aad ku hadasho Soomaali, waaxda luqadaha, qaybta kaalmada adeegyada, waxay marielin haykirstenn shelly lazar . So [facility] [phone].    ATENCIÓN: Si habla español, tiene a hayes disposición servicios gratuitos de asistencia lingüística. Llame al [phone].    We comply with applicable federal civil rights laws and [place] laws. We do not discriminate on the basis of race, color, national origin, age, disability sex, sexual orientation or gender identity.            Thank you!     Thank you for choosing [facility]  for your care. Our goal is always to provide you with excellent care. Hearing back from our patients is one way we can continue to improve our services. Please take a few minutes to complete the written survey that you may receive in the mail after your visit with us. Thank you!             Your Updated Medication List - Protect others around you: Learn how to safely use, store and throw away your medicines at www.disposemymeds.org.          This list is accurate as of: 9/15/17  3:03 PM.  Always use your most recent med list.                    Brand Name Dispense Instructions for use Diagnosis    allopurinol 300 MG tablet    ZYLOPRIM    90 tablet    TAKE 1 TABLET(300 MG) BY MOUTH DAILY    Idiopathic gout, unspecified chronicity, unspecified site       atorvastatin 40 MG tablet    LIPITOR    90 tablet    TAKE 1 TABLET BY MOUTH EVERY DAY    Hyperlipidemia, unspecified hyperlipidemia type       colchicine 0.6 MG tablet    COLCRYS    9 tablet    Take 2 tablets at the first sign of flare, take 1 additional tablet one hour later.    Acute gout of multiple sites, unspecified cause       ibuprofen 200 MG tablet    ADVIL/MOTRIN     Take 800 mg by mouth 3 times daily as needed for mild pain (RX given by Dr Chow at appt #90 with 1 refill) With food        indomethacin 25 MG capsule    INDOCIN    30 capsule    Take 1-2 capsules (25-50 mg) by mouth 3 times daily as needed (gout)    Acute gout of multiple sites, unspecified cause       sildenafil 50 MG tablet    VIAGRA    6 tablet    Take 1/2 to 1 tablet daily as needed    Erectile dysfunction, unspecified erectile dysfunction type       testosterone cypionate 200 MG/ML injection    DEPOTESTOTERONE    1 mL    Inject 1 mL (200 mg) into the muscle every 14 days    Testicular hypofunction

## 2017-09-15 NOTE — PROGRESS NOTES
The following medication was given:     MEDICATION: Depo Testosterone  200 mg  ROUTE: IM  SITE: [facility]  DOSE: 200mg  LOT #: [id number]  :  [employer]  EXPIRATION DATE:  12/31/19  NDC#: 6462-5055-61  Kaylah Weems

## 2017-09-15 NOTE — NURSING NOTE
The following medication was given:     MEDICATION: Depo Testosterone  200 mg  ROUTE: IM  SITE: [facility]  DOSE: 200mg  LOT #: [id number]  :  [employer]  EXPIRATION DATE:  12/31/19  NDC#: 5419-7998-93  Kaylah Weems

## 2017-09-20 DIAGNOSIS — E29.1 TESTICULAR HYPOFUNCTION: Primary | ICD-10-CM

## 2017-09-22 ENCOUNTER — TELEPHONE (OUTPATIENT)
Dept: FAMILY MEDICINE | Facility: OTHER | Age: 52
End: 2017-09-22
Payer: COMMERCIAL

## 2017-09-22 DIAGNOSIS — E78.5 HYPERLIPIDEMIA, UNSPECIFIED HYPERLIPIDEMIA TYPE: Primary | ICD-10-CM

## 2017-09-22 DIAGNOSIS — E29.1 HYPOGONADISM MALE: ICD-10-CM

## 2017-09-22 LAB
ALT SERPL W P-5'-P-CCNC: 38 U/L (ref 0–70)
CHOLEST SERPL-MCNC: 128 MG/DL
HCT VFR BLD AUTO: 52.2 % (ref 40–53)
HDLC SERPL-MCNC: 28 MG/DL
LDLC SERPL CALC-MCNC: 45 MG/DL
NONHDLC SERPL-MCNC: 100 MG/DL
TRIGL SERPL-MCNC: 275 MG/DL

## 2017-09-22 PROCEDURE — 85014 HEMATOCRIT: CPT | Performed by: NURSE PRACTITIONER

## 2017-09-22 PROCEDURE — 84403 ASSAY OF TOTAL TESTOSTERONE: CPT | Mod: 90 | Performed by: NURSE PRACTITIONER

## 2017-09-22 PROCEDURE — 36415 COLL VENOUS BLD VENIPUNCTURE: CPT | Performed by: FAMILY MEDICINE

## 2017-09-22 PROCEDURE — 80061 LIPID PANEL: CPT | Performed by: FAMILY MEDICINE

## 2017-09-22 PROCEDURE — 84460 ALANINE AMINO (ALT) (SGPT): CPT | Performed by: FAMILY MEDICINE

## 2017-09-22 PROCEDURE — 99000 SPECIMEN HANDLING OFFICE-LAB: CPT | Performed by: NURSE PRACTITIONER

## 2017-09-26 DIAGNOSIS — E29.1 TESTICULAR HYPOFUNCTION: ICD-10-CM

## 2017-09-26 LAB — TESTOST SERPL-MCNC: 736 NG/DL (ref 240–950)

## 2017-09-26 RX ORDER — TESTOSTERONE CYPIONATE 200 MG/ML
200 INJECTION, SOLUTION INTRAMUSCULAR
Qty: 1 ML | Refills: 5 | OUTPATIENT
Start: 2017-09-26 | End: 2018-03-16

## 2017-10-04 ENCOUNTER — ALLIED HEALTH/NURSE VISIT (OUTPATIENT)
Dept: FAMILY MEDICINE | Facility: OTHER | Age: 52
End: 2017-10-04
Attending: NURSE PRACTITIONER
Payer: COMMERCIAL

## 2017-10-04 DIAGNOSIS — E29.1 TESTICULAR HYPOFUNCTION: Primary | ICD-10-CM

## 2017-10-04 PROCEDURE — 96372 THER/PROPH/DIAG INJ SC/IM: CPT

## 2017-10-04 NOTE — MR AVS SNAPSHOT
"              After Visit Summary   10/4/2017    Mikey Jenkins    MRN: [id number]           Patient Information     Date Of Birth          1965        Visit Information        Provider Department      10/4/2017 9:45 AM [facility] NURSE [facility]        Today's Diagnoses     Testicular hypofunction    -  1       Follow-ups after your visit        Your next 10 appointments already scheduled     Mar 20, 2018  1:00 PM CDT   (Arrive by 12:45 PM)   Return Visit with Berna Rey NP   [facility] ([facility] [place] )    [address]  [place] [address]   [phone]              Who to contact     If you have questions or need follow up information about today's clinic visit or your schedule please contact [facility] directly at [phone].  Normal or non-critical lab and imaging results will be communicated to you by MyChart, letter or phone within 4 business days after the clinic has received the results. If you do not hear from us within 7 days, please contact the clinic through MyChart or phone. If you have a critical or abnormal lab result, we will notify you by phone as soon as possible.  Submit refill requests through [employer] or call your pharmacy and they will forward the refill request to us. Please allow 3 business days for your refill to be completed.          Additional Information About Your Visit        MyChart Information     [employer] lets you send messages to your doctor, view your test results, renew your prescriptions, schedule appointments and more. To sign up, go to [email or web address] . Click on \"Log in\" on the left side of the screen, which will take you to the Welcome page. Then click on \"Sign up Now\" on the right side of the page.     You will be asked to enter the access code listed below, as well as some personal information. Please follow the directions to create your username and password.     Your access code is: " [email or web address]  Expires: 2018 11:55 AM     Your access code will  in 90 days. If you need help or a new code, please call your [place] clinic or [phone].        Care EveryWhere ID     This is your Care EveryWhere ID. This could be used by other organizations to access your [place] medical records  [id number]         Blood Pressure from Last 3 Encounters:   17 124/68   17 110/70   17 110/80    Weight from Last 3 Encounters:   17 215 lb (97.5 kg)   17 219 lb (99.3 kg)   17 213 lb (96.6 kg)              We Performed the Following     C TESTOSTERONE CYPIONATE INJECTION, 1 MG     INJECTION INTRAMUSCULAR OR SUB-Q        Primary Care Provider Office Phone # Fax #    Anai Le [phone] [address] [facility] [address]        Equal Access to Services     RAOUL [facility] HospitalHARLEEN : Hadii aad ku hadasho Soomaali, waaxda luqadaha, qaybta kaalmada adeegyada, waxay idiin haykirstenn shelly lazar . So [facility] [phone].    ATENCIÓN: Si habla español, tiene a hayes disposición servicios gratuitos de asistencia lingüística. Llame al [phone].    We comply with applicable federal civil rights laws and [place] laws. We do not discriminate on the basis of race, color, national origin, age, disability, sex, sexual orientation, or gender identity.            Thank you!     Thank you for choosing [facility]  for your care. Our goal is always to provide you with excellent care. Hearing back from our patients is one way we can continue to improve our services. Please take a few minutes to complete the written survey that you may receive in the mail after your visit with us. Thank you!             Your Updated Medication List - Protect others around you: Learn how to safely use, store and throw away your medicines at www.disposemymeds.org.          This list is accurate as of: 10/4/17 11:55 AM.  Always use your most recent med  list.                   Brand Name Dispense Instructions for use Diagnosis    allopurinol 300 MG tablet    ZYLOPRIM    90 tablet    TAKE 1 TABLET(300 MG) BY MOUTH DAILY    Idiopathic gout, unspecified chronicity, unspecified site       atorvastatin 40 MG tablet    LIPITOR    90 tablet    TAKE 1 TABLET BY MOUTH EVERY DAY    Hyperlipidemia, unspecified hyperlipidemia type       colchicine 0.6 MG tablet    COLCRYS    9 tablet    Take 2 tablets at the first sign of flare, take 1 additional tablet one hour later.    Acute gout of multiple sites, unspecified cause       ibuprofen 200 MG tablet    ADVIL/MOTRIN     Take 800 mg by mouth 3 times daily as needed for mild pain (RX given by Dr Chow at appt #90 with 1 refill) With food        indomethacin 25 MG capsule    INDOCIN    30 capsule    Take 1-2 capsules (25-50 mg) by mouth 3 times daily as needed (gout)    Acute gout of multiple sites, unspecified cause       sildenafil 50 MG tablet    VIAGRA    6 tablet    Take 1/2 to 1 tablet daily as needed    Erectile dysfunction, unspecified erectile dysfunction type       testosterone cypionate 200 MG/ML injection    DEPOTESTOTERONE    1 mL    Inject 1 mL (200 mg) into the muscle every 14 days    Testicular hypofunction

## 2017-10-15 DIAGNOSIS — M10.00 IDIOPATHIC GOUT, UNSPECIFIED CHRONICITY, UNSPECIFIED SITE: ICD-10-CM

## 2017-10-17 RX ORDER — ALLOPURINOL 300 MG/1
TABLET ORAL
Qty: 90 TABLET | Refills: 1 | Status: SHIPPED | OUTPATIENT
Start: 2017-10-17 | End: 2018-06-28

## 2017-10-17 NOTE — TELEPHONE ENCOUNTER
Refill request for Zyloprim  Last refill 6/30/17 , qty #90  Last office visit 5/126/17  Patient is due for labs related to med.

## 2017-10-20 ENCOUNTER — ALLIED HEALTH/NURSE VISIT (OUTPATIENT)
Dept: FAMILY MEDICINE | Facility: OTHER | Age: 52
End: 2017-10-20
Attending: FAMILY MEDICINE
Payer: COMMERCIAL

## 2017-10-20 DIAGNOSIS — N52.9 ED (ERECTILE DYSFUNCTION): Primary | ICD-10-CM

## 2017-10-20 PROCEDURE — 96372 THER/PROPH/DIAG INJ SC/IM: CPT

## 2017-10-20 NOTE — MR AVS SNAPSHOT
"              After Visit Summary   10/20/2017    Mikey Jenkins    MRN: [id number]           Patient Information     Date Of Birth          1965        Visit Information        Provider Department      10/20/2017 1:30 PM [facility] NURSE [facility]        Today's Diagnoses     ED (erectile dysfunction)    -  1       Follow-ups after your visit        Your next 10 appointments already scheduled     Mar 20, 2018  1:00 PM CDT   (Arrive by 12:45 PM)   Return Visit with Berna Rey NP   [facility] [place] ([facility] [place] )    [address]  [place] [address]   [phone]              Who to contact     If you have questions or need follow up information about today's clinic visit or your schedule please contact [facility] directly at [phone].  Normal or non-critical lab and imaging results will be communicated to you by MyChart, letter or phone within 4 business days after the clinic has received the results. If you do not hear from us within 7 days, please contact the clinic through MyChart or phone. If you have a critical or abnormal lab result, we will notify you by phone as soon as possible.  Submit refill requests through [employer] or call your pharmacy and they will forward the refill request to us. Please allow 3 business days for your refill to be completed.          Additional Information About Your Visit        MyChart Information     [employer] lets you send messages to your doctor, view your test results, renew your prescriptions, schedule appointments and more. To sign up, go to [email or web address] . Click on \"Log in\" on the left side of the screen, which will take you to the Welcome page. Then click on \"Sign up Now\" on the right side of the page.     You will be asked to enter the access code listed below, as well as some personal information. Please follow the directions to create your username and password.     Your access code is: " [email or web address]  Expires: 2018 11:55 AM     Your access code will  in 90 days. If you need help or a new code, please call your [place] clinic or [phone].        Care EveryWhere ID     This is your Care EveryWhere ID. This could be used by other organizations to access your [place] medical records  [id number]         Blood Pressure from Last 3 Encounters:   17 124/68   17 110/70   17 110/80    Weight from Last 3 Encounters:   17 215 lb (97.5 kg)   17 219 lb (99.3 kg)   17 213 lb (96.6 kg)              Today, you had the following     No orders found for display       Primary Care Provider Office Phone # Fax #    Anai Le [phone] [address] [facility] [address]        Equal Access to Services     KANDI FUENTSE : Hadii celina ku hadasho Soomaali, waaxda luqadaha, qaybta kaalmada adeegyada, waxay marielin haykirstenn shelly lazar . So [facility] [phone].    ATENCIÓN: Si habla español, tiene a hayes disposición servicios gratuitos de asistencia lingüística. Llame al [phone].    We comply with applicable federal civil rights laws and [place] laws. We do not discriminate on the basis of race, color, national origin, age, disability, sex, sexual orientation, or gender identity.            Thank you!     Thank you for choosing [facility]  for your care. Our goal is always to provide you with excellent care. Hearing back from our patients is one way we can continue to improve our services. Please take a few minutes to complete the written survey that you may receive in the mail after your visit with us. Thank you!             Your Updated Medication List - Protect others around you: Learn how to safely use, store and throw away your medicines at www.disposemymeds.org.          This list is accurate as of: 10/20/17  1:40 PM.  Always use your most recent med list.                   Brand Name Dispense  Instructions for use Diagnosis    allopurinol 300 MG tablet    ZYLOPRIM    90 tablet    TAKE 1 TABLET(300 MG) BY MOUTH DAILY    Idiopathic gout, unspecified chronicity, unspecified site       atorvastatin 40 MG tablet    LIPITOR    90 tablet    TAKE 1 TABLET BY MOUTH EVERY DAY    Hyperlipidemia, unspecified hyperlipidemia type       colchicine 0.6 MG tablet    COLCRYS    9 tablet    Take 2 tablets at the first sign of flare, take 1 additional tablet one hour later.    Acute gout of multiple sites, unspecified cause       ibuprofen 200 MG tablet    ADVIL/MOTRIN     Take 800 mg by mouth 3 times daily as needed for mild pain (RX given by Dr Chow at appt #90 with 1 refill) With food        indomethacin 25 MG capsule    INDOCIN    30 capsule    Take 1-2 capsules (25-50 mg) by mouth 3 times daily as needed (gout)    Acute gout of multiple sites, unspecified cause       sildenafil 50 MG tablet    VIAGRA    6 tablet    Take 1/2 to 1 tablet daily as needed    Erectile dysfunction, unspecified erectile dysfunction type       testosterone cypionate 200 MG/ML injection    DEPOTESTOTERONE    1 mL    Inject 1 mL (200 mg) into the muscle every 14 days    Testicular hypofunction

## 2017-10-20 NOTE — NURSING NOTE
The following medication was given:     MEDICATION: Depo Testosterone 200 mg  ROUTE: IM  SITE: [facility]  DOSE: 200mg  LOT #: [id number]  :  [employer]  EXPIRATION DATE:  12/19  NDC#: 5025-1279-95  Kaylah Weems

## 2017-11-07 ENCOUNTER — ALLIED HEALTH/NURSE VISIT (OUTPATIENT)
Dept: FAMILY MEDICINE | Facility: OTHER | Age: 52
End: 2017-11-07
Attending: FAMILY MEDICINE
Payer: COMMERCIAL

## 2017-11-07 DIAGNOSIS — E29.1 TESTICULAR HYPOFUNCTION: Primary | ICD-10-CM

## 2017-11-07 PROCEDURE — 96372 THER/PROPH/DIAG INJ SC/IM: CPT

## 2017-11-07 NOTE — PROGRESS NOTES
MEDICATION: Testosterone 200 mg  ROUTE: IM  SITE: [facility]  DOSE: 200mg  LOT #: [id number]  :  [employer]  EXPIRATION DATE:  12/2019  NDC#: 8477-8610-61   Pamela M Lechevalier LPN

## 2017-11-07 NOTE — MR AVS SNAPSHOT
"              After Visit Summary   11/7/2017    Mikey Jenkins    MRN: [id number]           Patient Information     Date Of Birth          1965        Visit Information        Provider Department      11/7/2017 11:00 AM [facility] NURSE [facility]        Today's Diagnoses     Testicular hypofunction    -  1       Follow-ups after your visit        Your next 10 appointments already scheduled     Nov 07, 2017 11:00 AM CST   (Arrive by 10:45 AM)   Nurse Only with [facility] NURSE   [facility] ([facility] )    [address]  [facility] [address]   [phone]            Mar 20, 2018  1:00 PM CDT   (Arrive by 12:45 PM)   Return Visit with Berna Rey NP   [facility] ([facility] )    [address] [place] [address]   [phone]              Who to contact     If you have questions or need follow up information about today's clinic visit or your schedule please contact [facility] directly at [phone].  Normal or non-critical lab and imaging results will be communicated to you by Terviuhart, letter or phone within 4 business days after the clinic has received the results. If you do not hear from us within 7 days, please contact the clinic through Terviuhart or phone. If you have a critical or abnormal lab result, we will notify you by phone as soon as possible.  Submit refill requests through [employer] or call your pharmacy and they will forward the refill request to us. Please allow 3 business days for your refill to be completed.          Additional Information About Your Visit        MyChart Information     [employer] lets you send messages to your doctor, view your test results, renew your prescriptions, schedule appointments and more. To sign up, go to [email or web address] . Click on \"Log in\" on the left side of the screen, which will take you to the Welcome page. Then click on \"Sign up Now\" on the right " side of the page.     You will be asked to enter the access code listed below, as well as some personal information. Please follow the directions to create your username and password.     Your access code is: [email or web address]  Expires: 2018 10:55 AM     Your access code will  in 90 days. If you need help or a new code, please call your [place] clinic or [phone].        Care EveryWhere ID     This is your Care EveryWhere ID. This could be used by other organizations to access your [place] medical records  [id number]         Blood Pressure from Last 3 Encounters:   17 124/68   17 110/70   17 110/80    Weight from Last 3 Encounters:   17 215 lb (97.5 kg)   17 219 lb (99.3 kg)   17 213 lb (96.6 kg)              We Performed the Following     TESTOSTERONE CYPIONATE INJECTION 1MG     THER/PROPH/DIAG INJ, SC/IM        Primary Care Provider Office Phone # Fax #    Anai Le [phone] [facility] [address]        Equal Access to Services     [facility] ALFREDO : Hadii celina moniqueo Alycia, waaxda lubarringtonadaha, qaybta kaalmada adesaudda, maria eugenia gaytan. So [facility] [phone].    ATENCIÓN: Si habla español, tiene a hayes disposición servicios gratuitos de asistencia lingüística. [facility] [phone].    We comply with applicable federal civil rights laws and [place] laws. We do not discriminate on the basis of race, color, national origin, age, disability, sex, sexual orientation, or gender identity.            Thank you!     Thank you for choosing [facility]  for your care. Our goal is always to provide you with excellent care. Hearing back from our patients is one way we can continue to improve our services. Please take a few minutes to complete the written survey that you may receive in the mail after your visit with us. Thank you!             Your Updated Medication List - Protect  others around you: Learn how to safely use, store and throw away your medicines at www.disposemymeds.org.          This list is accurate as of: 11/7/17 10:55 AM.  Always use your most recent med list.                   Brand Name Dispense Instructions for use Diagnosis    allopurinol 300 MG tablet    ZYLOPRIM    90 tablet    TAKE 1 TABLET(300 MG) BY MOUTH DAILY    Idiopathic gout, unspecified chronicity, unspecified site       atorvastatin 40 MG tablet    LIPITOR    90 tablet    TAKE 1 TABLET BY MOUTH EVERY DAY    Hyperlipidemia, unspecified hyperlipidemia type       colchicine 0.6 MG tablet    COLCRYS    9 tablet    Take 2 tablets at the first sign of flare, take 1 additional tablet one hour later.    Acute gout of multiple sites, unspecified cause       ibuprofen 200 MG tablet    ADVIL/MOTRIN     Take 800 mg by mouth 3 times daily as needed for mild pain (RX given by Dr Chwo at appt #90 with 1 refill) With food        indomethacin 25 MG capsule    INDOCIN    30 capsule    Take 1-2 capsules (25-50 mg) by mouth 3 times daily as needed (gout)    Acute gout of multiple sites, unspecified cause       sildenafil 50 MG tablet    VIAGRA    6 tablet    Take 1/2 to 1 tablet daily as needed    Erectile dysfunction, unspecified erectile dysfunction type       testosterone cypionate 200 MG/ML injection    DEPOTESTOTERONE    1 mL    Inject 1 mL (200 mg) into the muscle every 14 days    Testicular hypofunction

## 2017-11-21 ENCOUNTER — ALLIED HEALTH/NURSE VISIT (OUTPATIENT)
Dept: FAMILY MEDICINE | Facility: OTHER | Age: 52
End: 2017-11-21
Attending: NURSE PRACTITIONER
Payer: COMMERCIAL

## 2017-11-21 DIAGNOSIS — N52.9 ED (ERECTILE DYSFUNCTION): Primary | ICD-10-CM

## 2017-11-21 PROCEDURE — 96372 THER/PROPH/DIAG INJ SC/IM: CPT

## 2017-11-21 NOTE — NURSING NOTE
The following medication was given:     MEDICATION: Depo Testosterone  200 mg  ROUTE: IM  SITE: [facility]  DOSE: 200mg  LOT #: [id number]  :  Pfizer  EXPIRATION DATE:  12/31/19  NDC#: 4992-9819-45  Kaylah Weems

## 2017-11-21 NOTE — MR AVS SNAPSHOT
"              After Visit Summary   11/21/2017    Mikey Jenkins    MRN: [id number]           Patient Information     Date Of Birth          1965        Visit Information        Provider Department      11/21/2017 2:45 PM [facility] NURSE [facility]        Today's Diagnoses     ED (erectile dysfunction)    -  1       Follow-ups after your visit        Your next 10 appointments already scheduled     Mar 20, 2018  1:00 PM CDT   (Arrive by 12:45 PM)   Return Visit with Berna Rey NP   [facility] [place] ([facility] [place] )    [address]  [place] [address]   [phone]              Who to contact     If you have questions or need follow up information about today's clinic visit or your schedule please contact [facility] directly at [phone].  Normal or non-critical lab and imaging results will be communicated to you by MyChart, letter or phone within 4 business days after the clinic has received the results. If you do not hear from us within 7 days, please contact the clinic through MyChart or phone. If you have a critical or abnormal lab result, we will notify you by phone as soon as possible.  Submit refill requests through Runa or call your pharmacy and they will forward the refill request to us. Please allow 3 business days for your refill to be completed.          Additional Information About Your Visit        MyChart Information     Runa lets you send messages to your doctor, view your test results, renew your prescriptions, schedule appointments and more. To sign up, go to [email or web address] . Click on \"Log in\" on the left side of the screen, which will take you to the Welcome page. Then click on \"Sign up Now\" on the right side of the page.     You will be asked to enter the access code listed below, as well as some personal information. Please follow the directions to create your username and password.     Your access code is: " [email or web address]  Expires: 2018 10:55 AM     Your access code will  in 90 days. If you need help or a new code, please call your [place] clinic or [phone].        Care EveryWhere ID     This is your Care EveryWhere ID. This could be used by other organizations to access your [place] medical records  [id number]         Blood Pressure from Last 3 Encounters:   17 124/68   17 110/70   17 110/80    Weight from Last 3 Encounters:   17 215 lb (97.5 kg)   17 219 lb (99.3 kg)   17 213 lb (96.6 kg)              We Performed the Following     INJECTION INTRAMUSCULAR OR SUB-Q     TESTOSTERONE CYPIONATE INJECTION 1MG        Primary Care Provider Office Phone # Fax #    Anai Le [phone] [address] [facility] [address]        Equal Access to Services     [facility] : Hadii aad ku hadasho Soomaali, waaxda luqadaha, qaybta kaalmada adeegyada, waxay idiin haykirstenn shelly lazar . So [facility] [phone].    ATENCIÓN: Si habla español, tiene a hayes disposición servicios gratuitos de asistencia lingüística. Llame al [phone].    We comply with applicable federal civil rights laws and [place] laws. We do not discriminate on the basis of race, color, national origin, age, disability, sex, sexual orientation, or gender identity.            Thank you!     Thank you for choosing [facility]  for your care. Our goal is always to provide you with excellent care. Hearing back from our patients is one way we can continue to improve our services. Please take a few minutes to complete the written survey that you may receive in the mail after your visit with us. Thank you!             Your Updated Medication List - Protect others around you: Learn how to safely use, store and throw away your medicines at www.disposemymeds.org.          This list is accurate as of: 17  3:35 PM.  Always use your most recent med list.                    Brand Name Dispense Instructions for use Diagnosis    allopurinol 300 MG tablet    ZYLOPRIM    90 tablet    TAKE 1 TABLET(300 MG) BY MOUTH DAILY    Idiopathic gout, unspecified chronicity, unspecified site       atorvastatin 40 MG tablet    LIPITOR    90 tablet    TAKE 1 TABLET BY MOUTH EVERY DAY    Hyperlipidemia, unspecified hyperlipidemia type       colchicine 0.6 MG tablet    COLCRYS    9 tablet    Take 2 tablets at the first sign of flare, take 1 additional tablet one hour later.    Acute gout of multiple sites, unspecified cause       ibuprofen 200 MG tablet    ADVIL/MOTRIN     Take 800 mg by mouth 3 times daily as needed for mild pain (RX given by Dr Chow at appt #90 with 1 refill) With food        indomethacin 25 MG capsule    INDOCIN    30 capsule    Take 1-2 capsules (25-50 mg) by mouth 3 times daily as needed (gout)    Acute gout of multiple sites, unspecified cause       sildenafil 50 MG tablet    VIAGRA    6 tablet    Take 1/2 to 1 tablet daily as needed    Erectile dysfunction, unspecified erectile dysfunction type       testosterone cypionate 200 MG/ML injection    DEPOTESTOTERONE    1 mL    Inject 1 mL (200 mg) into the muscle every 14 days    Testicular hypofunction

## 2017-12-05 ENCOUNTER — ALLIED HEALTH/NURSE VISIT (OUTPATIENT)
Dept: FAMILY MEDICINE | Facility: OTHER | Age: 52
End: 2017-12-05
Attending: NURSE PRACTITIONER
Payer: COMMERCIAL

## 2017-12-05 DIAGNOSIS — N52.9 ED (ERECTILE DYSFUNCTION): Primary | ICD-10-CM

## 2017-12-05 PROCEDURE — 96372 THER/PROPH/DIAG INJ SC/IM: CPT

## 2017-12-05 NOTE — NURSING NOTE
he following medication was given:     MEDICATION: Depo Testosterone  200 mg  ROUTE: IM  SITE: [facility]  DOSE: 200mg  LOT #: [id number]  :  Pfizer  EXPIRATION DATE:  12/31/19  NDC#: 3877-6539-25   Kaylah Weems

## 2017-12-05 NOTE — MR AVS SNAPSHOT
"              After Visit Summary   12/5/2017    Mikey Jenkins    MRN: [id number]           Patient Information     Date Of Birth          1965        Visit Information        Provider Department      12/5/2017 2:45 PM [facility] NURSE [facility]        Today's Diagnoses     ED (erectile dysfunction)    -  1       Follow-ups after your visit        Your next 10 appointments already scheduled     Mar 20, 2018  1:00 PM CDT   (Arrive by 12:45 PM)   Return Visit with Berna Rey NP   [facility] [place] ([facility] [place] )    [address]  [place] [address]   [phone]              Who to contact     If you have questions or need follow up information about today's clinic visit or your schedule please contact [facility] directly at [phone].  Normal or non-critical lab and imaging results will be communicated to you by MyChart, letter or phone within 4 business days after the clinic has received the results. If you do not hear from us within 7 days, please contact the clinic through MyChart or phone. If you have a critical or abnormal lab result, we will notify you by phone as soon as possible.  Submit refill requests through [employer] or call your pharmacy and they will forward the refill request to us. Please allow 3 business days for your refill to be completed.          Additional Information About Your Visit        MyChart Information     [employer] lets you send messages to your doctor, view your test results, renew your prescriptions, schedule appointments and more. To sign up, go to [email or web address] . Click on \"Log in\" on the left side of the screen, which will take you to the Welcome page. Then click on \"Sign up Now\" on the right side of the page.     You will be asked to enter the access code listed below, as well as some personal information. Please follow the directions to create your username and password.     Your access code is: " [email or web address]  Expires: 2018 10:55 AM     Your access code will  in 90 days. If you need help or a new code, please call your [place] clinic or [phone].        Care EveryWhere ID     This is your Care EveryWhere ID. This could be used by other organizations to access your [place] medical records  [id number]         Blood Pressure from Last 3 Encounters:   17 124/68   17 110/70   17 110/80    Weight from Last 3 Encounters:   17 215 lb (97.5 kg)   17 219 lb (99.3 kg)   17 213 lb (96.6 kg)              We Performed the Following     C TESTOSTERONE CYPIONATE INJECTION, 1 MG     INJECTION INTRAMUSCULAR OR SUB-Q        Primary Care Provider Office Phone # Fax #    Anai Le [phone] [facility] [facility] [address]        Equal Access to Services     [facility] : Hadii aad ku hadasho Soomaali, waaxda luqadaha, qaybta kaalmada adeegyada, waxay idiin haykirstenn shelly lazar . So [facility] [phone].    ATENCIÓN: Si habla español, tiene a hayes disposición servicios gratuitos de asistencia lingüística. Llame al [phone].    We comply with applicable federal civil rights laws and [place] laws. We do not discriminate on the basis of race, color, national origin, age, disability, sex, sexual orientation, or gender identity.            Thank you!     Thank you for choosing [facility]  for your care. Our goal is always to provide you with excellent care. Hearing back from our patients is one way we can continue to improve our services. Please take a few minutes to complete the written survey that you may receive in the mail after your visit with us. Thank you!             Your Updated Medication List - Protect others around you: Learn how to safely use, store and throw away your medicines at www.disposemymeds.org.          This list is accurate as of: 17  3:17 PM.  Always use your most recent med  list.                   Brand Name Dispense Instructions for use Diagnosis    allopurinol 300 MG tablet    ZYLOPRIM    90 tablet    TAKE 1 TABLET(300 MG) BY MOUTH DAILY    Idiopathic gout, unspecified chronicity, unspecified site       atorvastatin 40 MG tablet    LIPITOR    90 tablet    TAKE 1 TABLET BY MOUTH EVERY DAY    Hyperlipidemia, unspecified hyperlipidemia type       colchicine 0.6 MG tablet    COLCRYS    9 tablet    Take 2 tablets at the first sign of flare, take 1 additional tablet one hour later.    Acute gout of multiple sites, unspecified cause       ibuprofen 200 MG tablet    ADVIL/MOTRIN     Take 800 mg by mouth 3 times daily as needed for mild pain (RX given by Dr Chow at appt #90 with 1 refill) With food        indomethacin 25 MG capsule    INDOCIN    30 capsule    Take 1-2 capsules (25-50 mg) by mouth 3 times daily as needed (gout)    Acute gout of multiple sites, unspecified cause       sildenafil 50 MG tablet    VIAGRA    6 tablet    Take 1/2 to 1 tablet daily as needed    Erectile dysfunction, unspecified erectile dysfunction type       testosterone cypionate 200 MG/ML injection    DEPOTESTOTERONE    1 mL    Inject 1 mL (200 mg) into the muscle every 14 days    Testicular hypofunction

## 2017-12-19 ENCOUNTER — ALLIED HEALTH/NURSE VISIT (OUTPATIENT)
Dept: FAMILY MEDICINE | Facility: OTHER | Age: 52
End: 2017-12-19
Payer: COMMERCIAL

## 2017-12-19 DIAGNOSIS — E29.1 TESTICULAR HYPOFUNCTION: Primary | ICD-10-CM

## 2017-12-19 PROCEDURE — 96372 THER/PROPH/DIAG INJ SC/IM: CPT

## 2017-12-19 NOTE — MR AVS SNAPSHOT
"              After Visit Summary   12/19/2017    Mikey Jenkins    MRN: [id number]           Patient Information     Date Of Birth          1965        Visit Information        Provider Department      12/19/2017 10:45 AM [facility] NURSE [facility]        Today's Diagnoses     Testicular hypofunction    -  1       Follow-ups after your visit        Your next 10 appointments already scheduled     Mar 20, 2018  1:00 PM CDT   (Arrive by 12:45 PM)   Return Visit with Berna Rey NP   [facility] [place] )    [address]  [place] [address]   [phone]              Who to contact     If you have questions or need follow up information about today's clinic visit or your schedule please contact [facility] directly at [phone].  Normal or non-critical lab and imaging results will be communicated to you by MyChart, letter or phone within 4 business days after the clinic has received the results. If you do not hear from us within 7 days, please contact the clinic through MyChart or phone. If you have a critical or abnormal lab result, we will notify you by phone as soon as possible.  Submit refill requests through Face[employer]com or call your pharmacy and they will forward the refill request to us. Please allow 3 business days for your refill to be completed.          Additional Information About Your Visit        MyChart Information     Face[email or web address] lets you send messages to your doctor, view your test results, renew your prescriptions, schedule appointments and more. To sign up, go to [email or web address] . Click on \"Log in\" on the left side of the screen, which will take you to the Welcome page. Then click on \"Sign up Now\" on the right side of the page.     You will be asked to enter the access code listed below, as well as some personal information. Please follow the directions to create your username and password.     Your access code is: " [email or web address]  Expires: 2018 10:55 AM     Your access code will  in 90 days. If you need help or a new code, please call your [place] clinic or [phone].        Care EveryWhere ID     This is your Care EveryWhere ID. This could be used by other organizations to access your [place] medical records  [id number]         Blood Pressure from Last 3 Encounters:   17 124/68   17 110/70   17 110/80    Weight from Last 3 Encounters:   17 215 lb (97.5 kg)   17 219 lb (99.3 kg)   17 213 lb (96.6 kg)              Today, you had the following     No orders found for display       Primary Care Provider Office Phone # Fax #    Anai Le [phone] [facility] [address]        Equal Access to Services     KANDI FUENTES : Hadii celina ku hadasho Soomaali, waaxda luqadaha, qaybta kaalmada adeegyada, waxay marielin haykirstenn shelly lazar . So [facility] [phone].    ATENCIÓN: Si habla español, tiene a hayes disposición servicios gratuitos de asistencia lingüística. Llame al [phone].    We comply with applicable federal civil rights laws and [place] laws. We do not discriminate on the basis of race, color, national origin, age, disability, sex, sexual orientation, or gender identity.            Thank you!     Thank you for choosing [facility]  for your care. Our goal is always to provide you with excellent care. Hearing back from our patients is one way we can continue to improve our services. Please take a few minutes to complete the written survey that you may receive in the mail after your visit with us. Thank you!             Your Updated Medication List - Protect others around you: Learn how to safely use, store and throw away your medicines at www.disposemymeds.org.          This list is accurate as of: 17 10:58 AM.  Always use your most recent med list.                   Brand Name Dispense  Instructions for use Diagnosis    allopurinol 300 MG tablet    ZYLOPRIM    90 tablet    TAKE 1 TABLET(300 MG) BY MOUTH DAILY    Idiopathic gout, unspecified chronicity, unspecified site       atorvastatin 40 MG tablet    LIPITOR    90 tablet    TAKE 1 TABLET BY MOUTH EVERY DAY    Hyperlipidemia, unspecified hyperlipidemia type       colchicine 0.6 MG tablet    COLCRYS    9 tablet    Take 2 tablets at the first sign of flare, take 1 additional tablet one hour later.    Acute gout of multiple sites, unspecified cause       ibuprofen 200 MG tablet    ADVIL/MOTRIN     Take 800 mg by mouth 3 times daily as needed for mild pain (RX given by Dr Chow at appt #90 with 1 refill) With food        indomethacin 25 MG capsule    INDOCIN    30 capsule    Take 1-2 capsules (25-50 mg) by mouth 3 times daily as needed (gout)    Acute gout of multiple sites, unspecified cause       sildenafil 50 MG tablet    VIAGRA    6 tablet    Take 1/2 to 1 tablet daily as needed    Erectile dysfunction, unspecified erectile dysfunction type       testosterone cypionate 200 MG/ML injection    DEPOTESTOTERONE    1 mL    Inject 1 mL (200 mg) into the muscle every 14 days    Testicular hypofunction

## 2017-12-19 NOTE — PROGRESS NOTES
The following medication was given:     MEDICATION: Testosterone 200 mg  ROUTE: IM  SITE: [facility] Gl[facility]us  DOSE: 200mg  LOT #: [id number]  :  Pfizer  EXPIRATION DATE:  02/28/20  NDC: 5895-0471-08  Prior to injection verified patient identity using patient's name and date of birth.  Per orders of Dr. Rockwell, injection of 200mg given by Yen Whitten MA. Patient instructed to remain in clinic for 15 minutes afterwards, and to report any adverse reaction to me immediately.     Patient left against medical advice

## 2017-12-31 DIAGNOSIS — E78.5 HYPERLIPIDEMIA, UNSPECIFIED HYPERLIPIDEMIA TYPE: ICD-10-CM

## 2018-01-03 ENCOUNTER — ALLIED HEALTH/NURSE VISIT (OUTPATIENT)
Dept: FAMILY MEDICINE | Facility: OTHER | Age: 53
End: 2018-01-03
Attending: NURSE PRACTITIONER
Payer: COMMERCIAL

## 2018-01-03 DIAGNOSIS — N52.9 ED (ERECTILE DYSFUNCTION): Primary | ICD-10-CM

## 2018-01-03 PROCEDURE — 96372 THER/PROPH/DIAG INJ SC/IM: CPT

## 2018-01-03 RX ORDER — ATORVASTATIN CALCIUM 40 MG/1
TABLET, FILM COATED ORAL
Qty: 90 TABLET | Refills: 1 | Status: SHIPPED | OUTPATIENT
Start: 2018-01-03 | End: 2018-08-20

## 2018-01-03 NOTE — TELEPHONE ENCOUNTER
Lipitor       Last Written Prescription Date: 6/22/2017  Last Fill Quantity: 90,  # refills: 1   Last Office Visit with [facility], [facility] or [facility] prescribing provider: 5/16/2017                                         Next 5 appointments (look out 90 days)     Mar 20, 2018  1:00 PM CDT   (Arrive by 12:45 PM)   Return Visit with Berna Rey NP   [facility] ([facility] [place] )    [address] [place] [address]   [phone]

## 2018-01-03 NOTE — NURSING NOTE
The following medication was given:     MEDICATION: Depo Provera 150mg  ROUTE: IM  SITE: [facility]  DOSE: 200mg  LOT #: [id number]  :  Pfizer  EXPIRATION DATE:  12/31/19  NDC#: 0044-4980-99  Kaylah Weems

## 2018-01-03 NOTE — MR AVS SNAPSHOT
"              After Visit Summary   1/3/2018    Mikey Jenkins    MRN: [id number]           Patient Information     Date Of Birth          1965        Visit Information        Provider Department      1/3/2018 3:00 PM [facility] NURSE [facility]        Today's Diagnoses     ED (erectile dysfunction)    -  1       Follow-ups after your visit        Your next 10 appointments already scheduled     Mar 20, 2018  1:00 PM CDT   (Arrive by 12:45 PM)   Return Visit with Berna Rey NP   [facility] [place] ([facility] [place] )    [address]  [place] [address]   [phone]              Who to contact     If you have questions or need follow up information about today's clinic visit or your schedule please contact [facility] directly at [phone].  Normal or non-critical lab and imaging results will be communicated to you by MyChart, letter or phone within 4 business days after the clinic has received the results. If you do not hear from us within 7 days, please contact the clinic through MyChart or phone. If you have a critical or abnormal lab result, we will notify you by phone as soon as possible.  Submit refill requests through [employer] or call your pharmacy and they will forward the refill request to us. Please allow 3 business days for your refill to be completed.          Additional Information About Your Visit        MyChart Information     [employer] lets you send messages to your doctor, view your test results, renew your prescriptions, schedule appointments and more. To sign up, go to [email or web address] . Click on \"Log in\" on the left side of the screen, which will take you to the Welcome page. Then click on \"Sign up Now\" on the right side of the page.     You will be asked to enter the access code listed below, as well as some personal information. Please follow the directions to create your username and password.     Your access code is: " [email or web address]  Expires: 4/3/2018  3:09 PM     Your access code will  in 90 days. If you need help or a new code, please call your [place] clinic or [phone].        Care EveryWhere ID     This is your Care EveryWhere ID. This could be used by other organizations to access your [facility] medical records  [id number]         Blood Pressure from Last 3 Encounters:   17 124/68   17 110/70   17 110/80    Weight from Last 3 Encounters:   17 215 lb (97.5 kg)   17 219 lb (99.3 kg)   17 213 lb (96.6 kg)              We Performed the Following     INJECTION INTRAMUSCULAR OR SUB-Q     TESTOSTERONE CYPIONATE INJECTION 1MG        Primary Care Provider Office Phone # Fax #    Anaianika Le [phone] [address] [facility] [address]        Equal Access to Services     [facility] : Hadii aad ku hadasho Soomaali, waaxda luqadaha, qaybta kaalmada adeegyada, waxay marielin haykirstenn shelly lazar . So [facility] [phone].    ATENCIÓN: Si habla español, tiene a hayes disposición servicios gratuitos de asistencia lingüística. Llame al [phone].    We comply with applicable federal civil rights laws and [place] laws. We do not discriminate on the basis of race, color, national origin, age, disability, sex, sexual orientation, or gender identity.            Thank you!     Thank you for choosing [facility]  for your care. Our goal is always to provide you with excellent care. Hearing back from our patients is one way we can continue to improve our services. Please take a few minutes to complete the written survey that you may receive in the mail after your visit with us. Thank you!             Your Updated Medication List - Protect others around you: Learn how to safely use, store and throw away your medicines at www.disposemymeds.org.          This list is accurate as of: 1/3/18  3:09 PM.  Always use your most recent med list.                    Brand Name Dispense Instructions for use Diagnosis    allopurinol 300 MG tablet    ZYLOPRIM    90 tablet    TAKE 1 TABLET(300 MG) BY MOUTH DAILY    Idiopathic gout, unspecified chronicity, unspecified site       atorvastatin 40 MG tablet    LIPITOR    90 tablet    TAKE 1 TABLET BY MOUTH EVERY DAY    Hyperlipidemia, unspecified hyperlipidemia type       colchicine 0.6 MG tablet    COLCRYS    9 tablet    Take 2 tablets at the first sign of flare, take 1 additional tablet one hour later.    Acute gout of multiple sites, unspecified cause       ibuprofen 200 MG tablet    ADVIL/MOTRIN     Take 800 mg by mouth 3 times daily as needed for mild pain (RX given by Dr Chow at appt #90 with 1 refill) With food        indomethacin 25 MG capsule    INDOCIN    30 capsule    Take 1-2 capsules (25-50 mg) by mouth 3 times daily as needed (gout)    Acute gout of multiple sites, unspecified cause       sildenafil 50 MG tablet    VIAGRA    6 tablet    Take 1/2 to 1 tablet daily as needed    Erectile dysfunction, unspecified erectile dysfunction type       testosterone cypionate 200 MG/ML injection    DEPOTESTOTERONE    1 mL    Inject 1 mL (200 mg) into the muscle every 14 days    Testicular hypofunction

## 2018-01-17 ENCOUNTER — ALLIED HEALTH/NURSE VISIT (OUTPATIENT)
Dept: FAMILY MEDICINE | Facility: OTHER | Age: 53
End: 2018-01-17
Attending: FAMILY MEDICINE
Payer: COMMERCIAL

## 2018-01-17 DIAGNOSIS — N52.9 ED (ERECTILE DYSFUNCTION): Primary | ICD-10-CM

## 2018-01-17 PROCEDURE — 96372 THER/PROPH/DIAG INJ SC/IM: CPT

## 2018-01-17 NOTE — MR AVS SNAPSHOT
"              After Visit Summary   1/17/2018    Mikey Jenkins    MRN: [id number]           Patient Information     Date Of Birth          1965        Visit Information        Provider Department      1/17/2018 10:15 AM [facility] NURSE [facility]        Today's Diagnoses     ED (erectile dysfunction)    -  1       Follow-ups after your visit        Your next 10 appointments already scheduled     Mar 20, 2018  1:00 PM CDT   (Arrive by 12:45 PM)   Return Visit with Berna Rey NP   [facility] [place] ([facility] [place] )    [address]  [place] [address]   [phone]              Who to contact     If you have questions or need follow up information about today's clinic visit or your schedule please contact [facility] directly at [phone].  Normal or non-critical lab and imaging results will be communicated to you by MyChart, letter or phone within 4 business days after the clinic has received the results. If you do not hear from us within 7 days, please contact the clinic through MyChart or phone. If you have a critical or abnormal lab result, we will notify you by phone as soon as possible.  Submit refill requests through [employer] or call your pharmacy and they will forward the refill request to us. Please allow 3 business days for your refill to be completed.          Additional Information About Your Visit        MyChart Information     [employer] lets you send messages to your doctor, view your test results, renew your prescriptions, schedule appointments and more. To sign up, go to [email or web address] . Click on \"Log in\" on the left side of the screen, which will take you to the Welcome page. Then click on \"Sign up Now\" on the right side of the page.     You will be asked to enter the access code listed below, as well as some personal information. Please follow the directions to create your username and password.     Your access code is: " [email or web address]  Expires: 4/3/2018  3:09 PM     Your access code will  in 90 days. If you need help or a new code, please call your [place] clinic or [phone].        Care EveryWhere ID     This is your Care EveryWhere ID. This could be used by other organizations to access your [place] medical records  [id number]         Blood Pressure from Last 3 Encounters:   17 124/68   17 110/70   17 110/80    Weight from Last 3 Encounters:   17 215 lb (97.5 kg)   17 219 lb (99.3 kg)   17 213 lb (96.6 kg)              We Performed the Following     INJECTION INTRAMUSCULAR OR SUB-Q     Medroxyprogesterone inj/1mg (Depo Provera J-Code)        Primary Care Provider Office Phone # Fax #    Anai ELLI Le [phone] [address] [facility] [address]        Equal Access to Services     [facility]: Hadii celina ku hadasho Soomaali, waaxda luqadaha, qaybta kaalmada adeegyalos, maria eugenia lazar . So [facility] [phone].    ATENCIÓN: Si habla español, tiene a hayes disposición servicios gratuitos de asistencia lingüística. Llame al [phone].    We comply with applicable federal civil rights laws and [place] laws. We do not discriminate on the basis of race, color, national origin, age, disability, sex, sexual orientation, or gender identity.            Thank you!     Thank you for choosing [facility]  for your care. Our goal is always to provide you with excellent care. Hearing back from our patients is one way we can continue to improve our services. Please take a few minutes to complete the written survey that you may receive in the mail after your visit with us. Thank you!             Your Updated Medication List - Protect others around you: Learn how to safely use, store and throw away your medicines at www.disposemymeds.org.          This list is accurate as of 18 11:59 PM.  Always use your most recent  med list.                   Brand Name Dispense Instructions for use Diagnosis    allopurinol 300 MG tablet    ZYLOPRIM    90 tablet    TAKE 1 TABLET(300 MG) BY MOUTH DAILY    Idiopathic gout, unspecified chronicity, unspecified site       atorvastatin 40 MG tablet    LIPITOR    90 tablet    TAKE 1 TABLET BY MOUTH EVERY DAY    Hyperlipidemia, unspecified hyperlipidemia type       colchicine 0.6 MG tablet    COLCRYS    9 tablet    Take 2 tablets at the first sign of flare, take 1 additional tablet one hour later.    Acute gout of multiple sites, unspecified cause       ibuprofen 200 MG tablet    ADVIL/MOTRIN     Take 800 mg by mouth 3 times daily as needed for mild pain (RX given by Dr Chow at appt #90 with 1 refill) With food        indomethacin 25 MG capsule    INDOCIN    30 capsule    Take 1-2 capsules (25-50 mg) by mouth 3 times daily as needed (gout)    Acute gout of multiple sites, unspecified cause       sildenafil 50 MG tablet    VIAGRA    6 tablet    Take 1/2 to 1 tablet daily as needed    Erectile dysfunction, unspecified erectile dysfunction type       testosterone cypionate 200 MG/ML injection    DEPOTESTOTERONE    1 mL    Inject 1 mL (200 mg) into the muscle every 14 days    Testicular hypofunction

## 2018-01-17 NOTE — NURSING NOTE
The following medication was given:     MEDICATION: Depo Testosterone  200 mg  ROUTE: IM  SITE: [facility]  DOSE: 200mg  LOT #: [id number]  :  [employer]  EXPIRATION DATE:  12/19  NDC#: 7549-2492-44  Kaylah Weems

## 2018-01-31 ENCOUNTER — ALLIED HEALTH/NURSE VISIT (OUTPATIENT)
Dept: FAMILY MEDICINE | Facility: OTHER | Age: 53
End: 2018-01-31
Attending: NURSE PRACTITIONER
Payer: COMMERCIAL

## 2018-01-31 DIAGNOSIS — N52.9 ED (ERECTILE DYSFUNCTION): Primary | ICD-10-CM

## 2018-01-31 PROCEDURE — 96372 THER/PROPH/DIAG INJ SC/IM: CPT

## 2018-01-31 NOTE — NURSING NOTE
Late Entry  The following medication was given:     MEDICATION: Depo Testosterone  200 mg  ROUTE: IM  SITE: [facility]  DOSE: 200mg  LOT #: [id number]  :  [employer]  EXPIRATION DATE:  12/31/19  NDC#: 9897-8884-76  Kaylah Weems  Given on 1/03/18

## 2018-01-31 NOTE — MR AVS SNAPSHOT
"              After Visit Summary   1/31/2018    Mikey Jenkins    MRN: [id number]           Patient Information     Date Of Birth          1965        Visit Information        Provider Department      1/31/2018 11:30 AM [facility] NURSE [facility]        Today's Diagnoses     ED (erectile dysfunction)    -  1       Follow-ups after your visit        Your next 10 appointments already scheduled     Jan 31, 2018 11:30 AM CST   (Arrive by 11:15 AM)   Nurse Only with [facility] NURSE   [facility] ([facility] )    [address]  [facility] [address]   [phone]            Mar 20, 2018  1:00 PM CDT   (Arrive by 12:45 PM)   Return Visit with Berna Rey NP   [facility] ([facility] [place] )    [address]  [place] [address]   [phone]              Who to contact     If you have questions or need follow up information about today's clinic visit or your schedule please contact [facility] directly at [phone].  Normal or non-critical lab and imaging results will be communicated to you by [employer], letter or phone within 4 business days after the clinic has received the results. If you do not hear from us within 7 days, please contact the clinic through [employer] or phone. If you have a critical or abnormal lab result, we will notify you by phone as soon as possible.  Submit refill requests through [employer] or call your pharmacy and they will forward the refill request to us. Please allow 3 business days for your refill to be completed.          Additional Information About Your Visit        MyChart Information     [employer] lets you send messages to your doctor, view your test results, renew your prescriptions, schedule appointments and more. To sign up, go to [email or web address] . Click on \"Log in\" on the left side of the screen, which will take you to the Welcome page. Then click on \"Sign up Now\" on the " right side of the page.     You will be asked to enter the access code listed below, as well as some personal information. Please follow the directions to create your username and password.     Your access code is: [email or web address]  Expires: 4/3/2018  3:09 PM     Your access code will  in 90 days. If you need help or a new code, please call your [place] clinic or [phone].        Care EveryWhere ID     This is your Care EveryWhere ID. This could be used by other organizations to access your [facility] medical records  [id number]         Blood Pressure from Last 3 Encounters:   17 124/68   17 110/70   17 110/80    Weight from Last 3 Encounters:   17 215 lb (97.5 kg)   17 219 lb (99.3 kg)   17 213 lb (96.6 kg)              We Performed the Following     INJECTION INTRAMUSCULAR OR SUB-Q     TESTOSTERONE CYPIONATE INJECTION 1MG        Primary Care Provider Office Phone # Fax #    Anai Le [phone] [facility] [address]        Equal Access to Services     : Hadii aad ku hadasho Somagalieali, waaxda luqadaha, qaybta kaalmada adeegyada, maria eugenia lazar . So [facility] [phone].    ATENCIÓN: Si habla español, tiene a hayes disposición servicios gratuitos de asistencia lingüística. Atiya al [phone].    We comply with applicable federal civil rights laws and [place] laws. We do not discriminate on the basis of race, color, national origin, age, disability, sex, sexual orientation, or gender identity.            Thank you!     Thank you for choosing [facility]  for your care. Our goal is always to provide you with excellent care. Hearing back from our patients is one way we can continue to improve our services. Please take a few minutes to complete the written survey that you may receive in the mail after your visit with us. Thank you!             Your Updated Medication  List - Protect others around you: Learn how to safely use, store and throw away your medicines at www.disposemymeds.org.          This list is accurate as of 1/31/18 11:30 AM.  Always use your most recent med list.                   Brand Name Dispense Instructions for use Diagnosis    allopurinol 300 MG tablet    ZYLOPRIM    90 tablet    TAKE 1 TABLET(300 MG) BY MOUTH DAILY    Idiopathic gout, unspecified chronicity, unspecified site       atorvastatin 40 MG tablet    LIPITOR    90 tablet    TAKE 1 TABLET BY MOUTH EVERY DAY    Hyperlipidemia, unspecified hyperlipidemia type       colchicine 0.6 MG tablet    COLCRYS    9 tablet    Take 2 tablets at the first sign of flare, take 1 additional tablet one hour later.    Acute gout of multiple sites, unspecified cause       ibuprofen 200 MG tablet    ADVIL/MOTRIN     Take 800 mg by mouth 3 times daily as needed for mild pain (RX given by Dr Chow at appt #90 with 1 refill) With food        indomethacin 25 MG capsule    INDOCIN    30 capsule    Take 1-2 capsules (25-50 mg) by mouth 3 times daily as needed (gout)    Acute gout of multiple sites, unspecified cause       sildenafil 50 MG tablet    VIAGRA    6 tablet    Take 1/2 to 1 tablet daily as needed    Erectile dysfunction, unspecified erectile dysfunction type       testosterone cypionate 200 MG/ML injection    DEPOTESTOTERONE    1 mL    Inject 1 mL (200 mg) into the muscle every 14 days    Testicular hypofunction

## 2018-01-31 NOTE — NURSING NOTE
The following medication was given:     MEDICATION: Testosterone 200 mg  ROUTE: IM  SITE: [facility]  DOSE: 200mg  LOT #: [id number]  :  [employer]  EXPIRATION DATE:  12/31/19  NDC#: 5161-2234-84  Kaylah Weems

## 2018-02-14 ENCOUNTER — ALLIED HEALTH/NURSE VISIT (OUTPATIENT)
Dept: FAMILY MEDICINE | Facility: OTHER | Age: 53
End: 2018-02-14
Attending: FAMILY MEDICINE
Payer: COMMERCIAL

## 2018-02-14 DIAGNOSIS — N52.9 ED (ERECTILE DYSFUNCTION): Primary | ICD-10-CM

## 2018-02-14 PROCEDURE — 96372 THER/PROPH/DIAG INJ SC/IM: CPT

## 2018-02-14 NOTE — PROGRESS NOTES
The following medication was given:     MEDICATION: Depo Testosterone  200 mg/ml  ROUTE: IM  SITE: [facility] - Gluteus  DOSE: 200mg  LOT #: [id number]  :  [employer]  EXPIRATION DATE:  2/2020  NDC#: 4393-7839-44  Next injection due 14 days    Susan Monae

## 2018-02-14 NOTE — MR AVS SNAPSHOT
"              After Visit Summary   2/14/2018    Mikey Jenkins    MRN: [id number]           Patient Information     Date Of Birth          1965        Visit Information        Provider Department      2/14/2018 3:15 PM [facility] NURSE [facility]        Today's Diagnoses     ED (erectile dysfunction)    -  1       Follow-ups after your visit        Your next 10 appointments already scheduled     Mar 20, 2018  1:00 PM CDT   (Arrive by 12:45 PM)   Return Visit with Berna Rey NP   [facility] [place] ([facility] [place] )    [address]  [place] [address]   [phone]              Who to contact     If you have questions or need follow up information about today's clinic visit or your schedule please contact [facility] directly at [phone].  Normal or non-critical lab and imaging results will be communicated to you by MyChart, letter or phone within 4 business days after the clinic has received the results. If you do not hear from us within 7 days, please contact the clinic through MyChart or phone. If you have a critical or abnormal lab result, we will notify you by phone as soon as possible.  Submit refill requests through [employer] or call your pharmacy and they will forward the refill request to us. Please allow 3 business days for your refill to be completed.          Additional Information About Your Visit        MyChart Information     [employer] lets you send messages to your doctor, view your test results, renew your prescriptions, schedule appointments and more. To sign up, go to [email or web address] . Click on \"Log in\" on the left side of the screen, which will take you to the Welcome page. Then click on \"Sign up Now\" on the right side of the page.     You will be asked to enter the access code listed below, as well as some personal information. Please follow the directions to create your username and password.     Your access code is: " [email or web address]  Expires: 4/3/2018  3:09 PM     Your access code will  in 90 days. If you need help or a new code, please call your [place] clinic or [phone].        Care EveryWhere ID     This is your Care EveryWhere ID. This could be used by other organizations to access your [place] medical records  [id number]         Blood Pressure from Last 3 Encounters:   17 124/68   17 110/70   17 110/80    Weight from Last 3 Encounters:   17 215 lb (97.5 kg)   17 219 lb (99.3 kg)   17 213 lb (96.6 kg)              We Performed the Following     C TESTOSTERONE CYPIONATE INJECTION, 1 MG     INJECTION INTRAMUSCULAR OR SUB-Q        Primary Care Provider Office Phone # Fax #    Anai Le [phone] [address] [facility] [address]        Equal Access to Services     [facility] : Hadii aad ku hadasho Soomaali, waaxda luqadaha, qaybta kaalmada adeegyada, waxay marielin hayjoy lazar . So [facility] [phone].    ATENCIÓN: Si habla español, tiene a hayes disposición servicios gratuitos de asistencia lingüística. Llame al [phone].    We comply with applicable federal civil rights laws and [place] laws. We do not discriminate on the basis of race, color, national origin, age, disability, sex, sexual orientation, or gender identity.            Thank you!     Thank you for choosing [facility]  for your care. Our goal is always to provide you with excellent care. Hearing back from our patients is one way we can continue to improve our services. Please take a few minutes to complete the written survey that you may receive in the mail after your visit with us. Thank you!             Your Updated Medication List - Protect others around you: Learn how to safely use, store and throw away your medicines at www.disposemymeds.org.          This list is accurate as of 18  3:17 PM.  Always use your most recent med list.                    Brand Name Dispense Instructions for use Diagnosis    allopurinol 300 MG tablet    ZYLOPRIM    90 tablet    TAKE 1 TABLET(300 MG) BY MOUTH DAILY    Idiopathic gout, unspecified chronicity, unspecified site       atorvastatin 40 MG tablet    LIPITOR    90 tablet    TAKE 1 TABLET BY MOUTH EVERY DAY    Hyperlipidemia, unspecified hyperlipidemia type       colchicine 0.6 MG tablet    COLCRYS    9 tablet    Take 2 tablets at the first sign of flare, take 1 additional tablet one hour later.    Acute gout of multiple sites, unspecified cause       ibuprofen 200 MG tablet    ADVIL/MOTRIN     Take 800 mg by mouth 3 times daily as needed for mild pain (RX given by Dr Chow at appt #90 with 1 refill) With food        indomethacin 25 MG capsule    INDOCIN    30 capsule    Take 1-2 capsules (25-50 mg) by mouth 3 times daily as needed (gout)    Acute gout of multiple sites, unspecified cause       sildenafil 50 MG tablet    VIAGRA    6 tablet    Take 1/2 to 1 tablet daily as needed    Erectile dysfunction, unspecified erectile dysfunction type       testosterone cypionate 200 MG/ML injection    DEPOTESTOTERONE    1 mL    Inject 1 mL (200 mg) into the muscle every 14 days    Testicular hypofunction

## 2018-02-14 NOTE — NURSING NOTE
The following medication was given:     MEDICATION: Depo Testosterone  200 mg/ml  ROUTE: IM  SITE: [facility] - Gluteus  DOSE: 200mg  LOT #: [id number]  :  [employer]  EXPIRATION DATE:  2/2020  NDC#: 7222-4024-10  Next injection due 14 days    Susan Monae

## 2018-02-28 ENCOUNTER — ALLIED HEALTH/NURSE VISIT (OUTPATIENT)
Dept: FAMILY MEDICINE | Facility: OTHER | Age: 53
End: 2018-02-28
Attending: FAMILY MEDICINE
Payer: COMMERCIAL

## 2018-02-28 ENCOUNTER — TELEPHONE (OUTPATIENT)
Dept: FAMILY MEDICINE | Facility: OTHER | Age: 53
End: 2018-02-28

## 2018-02-28 DIAGNOSIS — N52.9 ED (ERECTILE DYSFUNCTION): Primary | ICD-10-CM

## 2018-02-28 PROCEDURE — 96372 THER/PROPH/DIAG INJ SC/IM: CPT

## 2018-02-28 NOTE — TELEPHONE ENCOUNTER
Patient will be coming in for follow up labs ordered by urology on 3/07/18. He is wondering if there are routine labs you would like drawn also. He would like to be drawn one time. Please advise.

## 2018-02-28 NOTE — NURSING NOTE
The following medication was given:     MEDICATION: Testosterone 200 mg  ROUTE: IM  SITE: [facility]  DOSE: 200mg  LOT #: [id number]  :  [employer]  EXPIRATION DATE:  2/20  NDC#: 3257-3351-02  Kaylah Weems

## 2018-02-28 NOTE — MR AVS SNAPSHOT
"              After Visit Summary   2/28/2018    Mikey Jenkins    MRN: [id number]           Patient Information     Date Of Birth          1965        Visit Information        Provider Department      2/28/2018 3:00 PM [facility] NURSE [facility]        Today's Diagnoses     ED (erectile dysfunction)    -  1       Follow-ups after your visit        Your next 10 appointments already scheduled     Mar 20, 2018  1:00 PM CDT   (Arrive by 12:45 PM)   Return Visit with Berna Rey NP   [facility] [place] ([facility] [place] )    [address]  [place] [address]   [phone]              Who to contact     If you have questions or need follow up information about today's clinic visit or your schedule please contact [facility] directly at [phone].  Normal or non-critical lab and imaging results will be communicated to you by MyChart, letter or phone within 4 business days after the clinic has received the results. If you do not hear from us within 7 days, please contact the clinic through MyChart or phone. If you have a critical or abnormal lab result, we will notify you by phone as soon as possible.  Submit refill requests through [email or web address] or call your pharmacy and they will forward the refill request to us. Please allow 3 business days for your refill to be completed.          Additional Information About Your Visit        MyChart Information     [email or web address] lets you send messages to your doctor, view your test results, renew your prescriptions, schedule appointments and more. To sign up, go to [email or web address] . Click on \"Log in\" on the left side of the screen, which will take you to the Welcome page. Then click on \"Sign up Now\" on the right side of the page.     You will be asked to enter the access code listed below, as well as some personal information. Please follow the directions to create your username and password.     Your access code is: " [email or web address]  Expires: 4/3/2018  3:09 PM     Your access code will  in 90 days. If you need help or a new code, please call your [place] clinic or [phone].        Care EveryWhere ID     This is your Care EveryWhere ID. This could be used by other organizations to access your [place] medical records  [id number]         Blood Pressure from Last 3 Encounters:   17 124/68   17 110/70   17 110/80    Weight from Last 3 Encounters:   17 215 lb (97.5 kg)   17 219 lb (99.3 kg)   17 213 lb (96.6 kg)              We Performed the Following     INJECTION INTRAMUSCULAR OR SUB-Q     TESTOSTERONE CYPIONATE INJECTION 1MG        Primary Care Provider Office Phone # Fax #    Anaianika Le [phone] [address] [facility] [address]        Equal Access to Services     [facility] : Hadii aad ku hadasho Soomaali, waaxda luqadaha, qaybta kaalmada adeegyada, waxay marielin haykirstenn shelly lazar . So [facility] [phone].    ATENCIÓN: Si habla español, tiene a hayes disposición servicios gratuitos de asistencia lingüística. Llame al [phone].    We comply with applicable federal civil rights laws and [place] laws. We do not discriminate on the basis of race, color, national origin, age, disability, sex, sexual orientation, or gender identity.            Thank you!     Thank you for choosing [facility]  for your care. Our goal is always to provide you with excellent care. Hearing back from our patients is one way we can continue to improve our services. Please take a few minutes to complete the written survey that you may receive in the mail after your visit with us. Thank you!             Your Updated Medication List - Protect others around you: Learn how to safely use, store and throw away your medicines at www.disposemymeds.org.          This list is accurate as of 18  3:25 PM.  Always use your most recent med list.                    Brand Name Dispense Instructions for use Diagnosis    allopurinol 300 MG tablet    ZYLOPRIM    90 tablet    TAKE 1 TABLET(300 MG) BY MOUTH DAILY    Idiopathic gout, unspecified chronicity, unspecified site       atorvastatin 40 MG tablet    LIPITOR    90 tablet    TAKE 1 TABLET BY MOUTH EVERY DAY    Hyperlipidemia, unspecified hyperlipidemia type       colchicine 0.6 MG tablet    COLCRYS    9 tablet    Take 2 tablets at the first sign of flare, take 1 additional tablet one hour later.    Acute gout of multiple sites, unspecified cause       ibuprofen 200 MG tablet    ADVIL/MOTRIN     Take 800 mg by mouth 3 times daily as needed for mild pain (RX given by Dr Chow at appt #90 with 1 refill) With food        indomethacin 25 MG capsule    INDOCIN    30 capsule    Take 1-2 capsules (25-50 mg) by mouth 3 times daily as needed (gout)    Acute gout of multiple sites, unspecified cause       sildenafil 50 MG tablet    VIAGRA    6 tablet    Take 1/2 to 1 tablet daily as needed    Erectile dysfunction, unspecified erectile dysfunction type       testosterone cypionate 200 MG/ML injection    DEPOTESTOTERONE    1 mL    Inject 1 mL (200 mg) into the muscle every 14 days    Testicular hypofunction

## 2018-03-07 DIAGNOSIS — E29.1 TESTICULAR HYPOFUNCTION: ICD-10-CM

## 2018-03-07 LAB
HCT VFR BLD AUTO: 49.5 % (ref 40–53)
PSA SERPL-MCNC: 1.55 UG/L (ref 0–4)

## 2018-03-07 PROCEDURE — 36415 COLL VENOUS BLD VENIPUNCTURE: CPT | Performed by: NURSE PRACTITIONER

## 2018-03-07 PROCEDURE — 84403 ASSAY OF TOTAL TESTOSTERONE: CPT | Mod: 90 | Performed by: NURSE PRACTITIONER

## 2018-03-07 PROCEDURE — 84153 ASSAY OF PSA TOTAL: CPT | Performed by: NURSE PRACTITIONER

## 2018-03-07 PROCEDURE — 85014 HEMATOCRIT: CPT | Performed by: NURSE PRACTITIONER

## 2018-03-07 PROCEDURE — 99000 SPECIMEN HANDLING OFFICE-LAB: CPT | Performed by: NURSE PRACTITIONER

## 2018-03-09 LAB — TESTOST SERPL-MCNC: 871 NG/DL (ref 240–950)

## 2018-03-14 ENCOUNTER — ALLIED HEALTH/NURSE VISIT (OUTPATIENT)
Dept: FAMILY MEDICINE | Facility: OTHER | Age: 53
End: 2018-03-14
Attending: FAMILY MEDICINE
Payer: COMMERCIAL

## 2018-03-14 DIAGNOSIS — N52.9 ED (ERECTILE DYSFUNCTION): Primary | ICD-10-CM

## 2018-03-14 PROCEDURE — 96372 THER/PROPH/DIAG INJ SC/IM: CPT

## 2018-03-14 NOTE — MR AVS SNAPSHOT
"              After Visit Summary   3/14/2018    Mikey Jenkins    MRN: [id number]           Patient Information     Date Of Birth          1965        Visit Information        Provider Department      3/14/2018 9:30 AM [facility] NURSE [facility]        Today's Diagnoses     ED (erectile dysfunction)    -  1       Follow-ups after your visit        Your next 10 appointments already scheduled     Apr 11, 2018 11:00 AM CDT   (Arrive by 10:45 AM)   New Visit with Rob Bajwa MD   [facility] ([facility] [place] )    [address]  [place] [address]   [phone]            May 22, 2018  1:30 PM CDT   (Arrive by 1:15 PM)   PHYSICAL with Anai Le MD   [facility] ([facility] )    [address]  [place] [address]   [phone]              Who to contact     If you have questions or need follow up information about today's clinic visit or your schedule please contact [facility] directly at [phone].  Normal or non-critical lab and imaging results will be communicated to you by MyChart, letter or phone within 4 business days after the clinic has received the results. If you do not hear from us within 7 days, please contact the clinic through Streamezzohart or phone. If you have a critical or abnormal lab result, we will notify you by phone as soon as possible.  Submit refill requests through Optovue or call your pharmacy and they will forward the refill request to us. Please allow 3 business days for your refill to be completed.          Additional Information About Your Visit        MyChart Information     Optovue lets you send messages to your doctor, view your test results, renew your prescriptions, schedule appointments and more. To sign up, go to [email or web address] . Click on \"Log in\" on the left side of the screen, which will take you to the Welcome page. Then click on \"Sign up Now\" on the " right side of the page.     You will be asked to enter the access code listed below, as well as some personal information. Please follow the directions to create your username and password.     Your access code is: [email or web address]  Expires: 4/3/2018  4:09 PM     Your access code will  in 90 days. If you need help or a new code, please call your [place] clinic or [phone].        Care EveryWhere ID     This is your Care EveryWhere ID. This could be used by other organizations to access your [place] medical records  [id number]         Blood Pressure from Last 3 Encounters:   17 124/68   17 110/70   17 110/80    Weight from Last 3 Encounters:   17 215 lb (97.5 kg)   17 219 lb (99.3 kg)   17 213 lb (96.6 kg)              We Performed the Following     INJECTION INTRAMUSCULAR OR SUB-Q     TESTOSTERONE CYPIONATE INJECTION 1MG        Primary Care Provider Office Phone # Fax #    Anai Le [phone] [address] [facility] [address]        Equal Access to Services     [facility]: Hadii aad ku hadasho Somagalieali, waaxda luqadaha, qaybta kaalmada adeegyada, maria eugenia lazar . So [facility] [phone].    ATENCIÓN: Si habla español, tiene a hayes disposición servicios gratuitos de asistencia lingüística. Atiya al [phone].    We comply with applicable federal civil rights laws and [place] laws. We do not discriminate on the basis of race, color, national origin, age, disability, sex, sexual orientation, or gender identity.            Thank you!     Thank you for choosing [facility]  for your care. Our goal is always to provide you with excellent care. Hearing back from our patients is one way we can continue to improve our services. Please take a few minutes to complete the written survey that you may receive in the mail after your visit with us. Thank you!             Your Updated Medication  List - Protect others around you: Learn how to safely use, store and throw away your medicines at www.disposemymeds.org.          This list is accurate as of 3/14/18 10:06 AM.  Always use your most recent med list.                   Brand Name Dispense Instructions for use Diagnosis    allopurinol 300 MG tablet    ZYLOPRIM    90 tablet    TAKE 1 TABLET(300 MG) BY MOUTH DAILY    Idiopathic gout, unspecified chronicity, unspecified site       atorvastatin 40 MG tablet    LIPITOR    90 tablet    TAKE 1 TABLET BY MOUTH EVERY DAY    Hyperlipidemia, unspecified hyperlipidemia type       colchicine 0.6 MG tablet    COLCRYS    9 tablet    Take 2 tablets at the first sign of flare, take 1 additional tablet one hour later.    Acute gout of multiple sites, unspecified cause       ibuprofen 200 MG tablet    ADVIL/MOTRIN     Take 800 mg by mouth 3 times daily as needed for mild pain (RX given by Dr Chow at appt #90 with 1 refill) With food        indomethacin 25 MG capsule    INDOCIN    30 capsule    Take 1-2 capsules (25-50 mg) by mouth 3 times daily as needed (gout)    Acute gout of multiple sites, unspecified cause       sildenafil 50 MG tablet    VIAGRA    6 tablet    Take 1/2 to 1 tablet daily as needed    Erectile dysfunction, unspecified erectile dysfunction type       testosterone cypionate 200 MG/ML injection    DEPOTESTOTERONE    1 mL    Inject 1 mL (200 mg) into the muscle every 14 days    Testicular hypofunction

## 2018-03-14 NOTE — NURSING NOTE
The following medication was given:     MEDICATION: Depo Testosterone  200 mg  ROUTE: IM  SITE: [facility]  DOSE: 200mg  LOT #:   :  [employer]  EXPIRATION DATE:  2/20  NDC#: 5871-0452-62  Kaylah Weems

## 2018-03-16 DIAGNOSIS — E29.1 TESTICULAR HYPOFUNCTION: ICD-10-CM

## 2018-03-16 RX ORDER — TESTOSTERONE CYPIONATE 200 MG/ML
200 INJECTION, SOLUTION INTRAMUSCULAR
Qty: 1 ML | Refills: 3 | OUTPATIENT
Start: 2018-03-16 | End: 2018-05-18

## 2018-03-28 ENCOUNTER — ALLIED HEALTH/NURSE VISIT (OUTPATIENT)
Dept: FAMILY MEDICINE | Facility: OTHER | Age: 53
End: 2018-03-28
Attending: UROLOGY
Payer: COMMERCIAL

## 2018-03-28 DIAGNOSIS — N52.9 ED (ERECTILE DYSFUNCTION): Primary | ICD-10-CM

## 2018-03-28 PROCEDURE — 96372 THER/PROPH/DIAG INJ SC/IM: CPT

## 2018-03-28 NOTE — MR AVS SNAPSHOT
"              After Visit Summary   3/28/2018    Mikey Jenkins    MRN: [id number]           Patient Information     Date Of Birth          1965        Visit Information        Provider Department      3/28/2018 2:45 PM [facility] NURSE [facility]        Today's Diagnoses     ED (erectile dysfunction)    -  1       Follow-ups after your visit        Your next 10 appointments already scheduled     Apr 11, 2018 11:00 AM CDT   (Arrive by 10:45 AM)   New Visit with [facility] Bajwa MD   [facility] [place] [facility]    [address]  [place] [address]   [phone]            May 22, 2018  1:30 PM CDT   (Arrive by 1:15 PM)   PHYSICAL with Anai Le MD   [facility] ([facility] )    [address]  [place] [address]   [phone]              Who to contact     If you have questions or need follow up information about today's clinic visit or your schedule please contact [facility] directly at [phone].  Normal or non-critical lab and imaging results will be communicated to you by MyChart, letter or phone within 4 business days after the clinic has received the results. If you do not hear from us within 7 days, please contact the clinic through castacliphart or phone. If you have a critical or abnormal lab result, we will notify you by phone as soon as possible.  Submit refill requests through Pivot3 or call your pharmacy and they will forward the refill request to us. Please allow 3 business days for your refill to be completed.          Additional Information About Your Visit        MyChart Information     Pivot3 lets you send messages to your doctor, view your test results, renew your prescriptions, schedule appointments and more. To sign up, go to [email or web address] . Click on \"Log in\" on the left side of the screen, which will take you to the Welcome page. Then click on \"Sign up Now\" on the " right side of the page.     You will be asked to enter the access code listed below, as well as some personal information. Please follow the directions to create your username and password.     Your access code is: [email or web address]  Expires: 4/3/2018  4:09 PM     Your access code will  in 90 days. If you need help or a new code, please call your [place] clinic or [phone].        Care EveryWhere ID     This is your Care EveryWhere ID. This could be used by other organizations to access your [place] medical records  [id number]         Blood Pressure from Last 3 Encounters:   17 124/68   17 110/70   17 110/80    Weight from Last 3 Encounters:   17 215 lb (97.5 kg)   17 219 lb (99.3 kg)   17 213 lb (96.6 kg)              We Performed the Following     INJECTION INTRAMUSCULAR OR SUB-Q     TESTOSTERONE CYPIONATE INJECTION 1MG        Primary Care Provider Office Phone # Fax #    Anai Le [phone] [address] [facility] [address]        Equal Access to Services     [facility]: Hadii aad ku hadasho Somagalieali, waaxda luqadaha, qaybta kaalmada adeegyada, maria eugenia lazar . So [facility] [phone].    ATENCIÓN: Si habla español, tiene a hayes disposición servicios gratuitos de asistencia lingüística. Atiya al [phone].    We comply with applicable federal civil rights laws and [place] laws. We do not discriminate on the basis of race, color, national origin, age, disability, sex, sexual orientation, or gender identity.            Thank you!     Thank you for choosing [facility]  for your care. Our goal is always to provide you with excellent care. Hearing back from our patients is one way we can continue to improve our services. Please take a few minutes to complete the written survey that you may receive in the mail after your visit with us. Thank you!             Your Updated Medication  List - Protect others around you: Learn how to safely use, store and throw away your medicines at www.disposemymeds.org.          This list is accurate as of 3/28/18  3:26 PM.  Always use your most recent med list.                   Brand Name Dispense Instructions for use Diagnosis    allopurinol 300 MG tablet    ZYLOPRIM    90 tablet    TAKE 1 TABLET(300 MG) BY MOUTH DAILY    Idiopathic gout, unspecified chronicity, unspecified site       atorvastatin 40 MG tablet    LIPITOR    90 tablet    TAKE 1 TABLET BY MOUTH EVERY DAY    Hyperlipidemia, unspecified hyperlipidemia type       colchicine 0.6 MG tablet    COLCRYS    9 tablet    Take 2 tablets at the first sign of flare, take 1 additional tablet one hour later.    Acute gout of multiple sites, unspecified cause       ibuprofen 200 MG tablet    ADVIL/MOTRIN     Take 800 mg by mouth 3 times daily as needed for mild pain (RX given by Dr Chow at appt #90 with 1 refill) With food        indomethacin 25 MG capsule    INDOCIN    30 capsule    Take 1-2 capsules (25-50 mg) by mouth 3 times daily as needed (gout)    Acute gout of multiple sites, unspecified cause       sildenafil 50 MG tablet    VIAGRA    6 tablet    Take 1/2 to 1 tablet daily as needed    Erectile dysfunction, unspecified erectile dysfunction type       testosterone cypionate 200 MG/ML injection    DEPOTESTOTERONE    1 mL    Inject 1 mL (200 mg) into the muscle every 14 days for 15 days    Testicular hypofunction

## 2018-04-04 NOTE — NURSING NOTE
MEDICATION: Depo Testosterone  200 mg  ROUTE: IM  SITE: [facility]  DOSE: 200mg  LOT #: [id number]  :  [employer]  EXPIRATION DATE:  2/20  NDC#: 2196-9505-29  Kaylah Weems

## 2018-04-11 ENCOUNTER — OFFICE VISIT (OUTPATIENT)
Dept: UROLOGY | Facility: OTHER | Age: 53
End: 2018-04-11
Attending: UROLOGY
Payer: COMMERCIAL

## 2018-04-11 VITALS
BODY MASS INDEX: 28 KG/M2 | SYSTOLIC BLOOD PRESSURE: 130 MMHG | HEIGHT: 71 IN | DIASTOLIC BLOOD PRESSURE: 78 MMHG | RESPIRATION RATE: 20 BRPM | OXYGEN SATURATION: 97 % | TEMPERATURE: 96.8 F | HEART RATE: 77 BPM | WEIGHT: 200 LBS

## 2018-04-11 DIAGNOSIS — N52.9 ERECTILE DYSFUNCTION, UNSPECIFIED ERECTILE DYSFUNCTION TYPE: ICD-10-CM

## 2018-04-11 DIAGNOSIS — E29.1 HYPOGONADISM MALE: Primary | ICD-10-CM

## 2018-04-11 PROCEDURE — 99203 OFFICE O/P NEW LOW 30 MIN: CPT | Performed by: UROLOGY

## 2018-04-11 RX ORDER — SILDENAFIL 100 MG/1
TABLET, FILM COATED ORAL
Qty: 150 TABLET | Refills: 0 | Status: SHIPPED | OUTPATIENT
Start: 2018-04-11 | End: 2018-09-18

## 2018-04-11 RX ORDER — TESTOSTERONE 1.62 MG/G
3 GEL TRANSDERMAL EVERY MORNING
Qty: 176 G | Refills: 5 | Status: SHIPPED | OUTPATIENT
Start: 2018-04-11 | End: 2018-05-18

## 2018-04-11 ASSESSMENT — PAIN SCALES - GENERAL: PAINLEVEL: NO PAIN (0)

## 2018-04-11 NOTE — MR AVS SNAPSHOT
After Visit Summary   4/11/2018    Mikey Jenkins    MRN: [id number]           Patient Information     Date Of Birth          1965        Visit Information        Provider Department      4/11/2018 11:00 AM Rob Bajwa MD [facility]        Today's Diagnoses     Hypogonadism male    -  1    Erectile dysfunction, unspecified erectile dysfunction type           Follow-ups after your visit        Your next 10 appointments already scheduled     May 22, 2018  1:30 PM CDT   (Arrive by 1:15 PM)   PHYSICAL with Anai Le MD   [facility] ([facility] )    [address]  [place] [address]   [phone]            May 23, 2018  9:30 AM CDT   LAB with [facility]   [facility] ([facility] )    [address]  [facility] [address]   [phone]            May 30, 2018  1:30 PM CDT   Return Visit with Rob Bajwa MD   [facility] ([facility])    [address]  [place] [id number] [phone]              Future tests that were ordered for you today     Open Future Orders        Priority Expected Expires Ordered    Hematocrit Routine  4/11/2019 4/11/2018    Testosterone, total Routine  4/11/2019 4/11/2018            Who to contact     If you have questions or need follow up information about today's clinic visit or your schedule please contact [facility] directly at [phone].  Normal or non-critical lab and imaging results will be communicated to you by MyChart, letter or phone within 4 business days after the clinic has received the results. If you do not hear from us within 7 days, please contact the clinic through MyChart or phone. If you have a critical or abnormal lab result, we will notify you by phone as soon as possible.  Submit refill requests through "[employer] or call your pharmacy and they will forward  "the refill request to us. Please allow 3 business days for your refill to be completed.          Additional Information About Your Visit        [employer] Information     [employer] lets you send messages to your doctor, view your test results, renew your prescriptions, schedule appointments and more. To sign up, go to [email or web address] . Click on \"Log in\" on the left side of the screen, which will take you to the Welcome page. Then click on \"Sign up Now\" on the right side of the page.     You will be asked to enter the access code listed below, as well as some personal information. Please follow the directions to create your username and password.     Your access code is: [email or web address]  Expires: 7/10/2018 11:31 AM     Your access code will  in 90 days. If you need help or a new code, please call your [place] clinic or [phone].        Care EveryWhere ID     This is your [facility] EveryWhere ID. This could be used by other organizations to access your [place] medical records  [id number]        Your Vitals Were     Pulse Temperature Respirations Height Pulse Oximetry BMI (Body Mass Index)    77 96.8  F (36  C) (Tympanic) 20 5' 11\" (1.803 m) 97% 27.89 kg/m2       Blood Pressure from Last 3 Encounters:   18 130/78   17 124/68   17 110/70    Weight from Last 3 Encounters:   18 200 lb (90.7 kg)   17 215 lb (97.5 kg)   17 219 lb (99.3 kg)                 Today's Medication Changes          These changes are accurate as of 18 11:47 AM.  If you have any questions, ask your nurse or doctor.               Start taking these medicines.        Dose/Directions    testosterone 20.25 MG/ACT gel   Commonly known as:  ANDROGEL 1.62% PUMP   Used for:  Hypogonadism male   Started by:  Rob Bajwa MD        Dose:  3 pump   Place 3 pumps (60.75 mg) onto the skin every morning   Quantity:  176 g   Refills:  5         These medicines have changed or have updated prescriptions.        " Dose/Directions    * sildenafil 50 MG tablet   Commonly known as:  VIAGRA   This may have changed:  Another medication with the same name was added. Make sure you understand how and when to take each.   Used for:  Erectile dysfunction, unspecified erectile dysfunction type   Changed by:  Rob Bajwa MD        Take 1/2 to 1 tablet daily as needed   Quantity:  6 tablet   Refills:  11       * sildenafil 100 MG tablet   Commonly known as:  VIAGRA   This may have changed:  You were already taking a medication with the same name, and this prescription was added. Make sure you understand how and when to take each.   Used for:  Erectile dysfunction, unspecified erectile dysfunction type   Changed by:  Rob Bajwa MD        Take 1 tablet 1 hour prior to sexual activity.  Do not take more than 1 tablet in 24 hours.   Quantity:  150 tablet   Refills:  0       * Notice:  This list has 2 medication(s) that are the same as other medications prescribed for you. Read the directions carefully, and ask your doctor or other care provider to review them with you.         Where to get your medicines      Some of these will need a paper prescription and others can be bought over the counter.  Ask your nurse if you have questions.     Bring a paper prescription for each of these medications     sildenafil 100 MG tablet    testosterone 20.25 MG/ACT gel                Primary Care Provider Office Phone # Fax #    Anai Le [phone]       [address] [place] [id number]        Equal Access to Services     RAOUL FUENTES : Pascual esteves Sotraci, waaxda luqadaha, qaybta kaalmamaria eugenia ceja. So [facility] [phone].    ATENCIÓN: Si habla español, tiene a hayes disposición servicios gratuitos de asistencia lingüística. Llame al [phone].    We comply with applicable federal civil rights laws and [place] laws. We do not discriminate on the basis of  race, color, national origin, age, disability, sex, sexual orientation, or gender identity.            Thank you!     Thank you for choosing [facility]  for your [facility]. Our goal is always to provide you with excellent care. Hearing back from our patients is one way we can continue to improve our services. Please take a few minutes to complete the written survey that you may receive in the mail after your visit with us. Thank you!             Your Updated Medication List - Protect others around you: Learn how to safely use, store and throw away your medicines at www.disposemymeds.org.          This list is accurate as of 4/11/18 11:47 AM.  Always use your most recent med list.                   Brand Name Dispense Instructions for use Diagnosis    allopurinol 300 MG tablet    ZYLOPRIM    90 tablet    TAKE 1 TABLET(300 MG) BY MOUTH DAILY    Idiopathic gout, unspecified chronicity, unspecified site       atorvastatin 40 MG tablet    LIPITOR    90 tablet    TAKE 1 TABLET BY MOUTH EVERY DAY    Hyperlipidemia, unspecified hyperlipidemia type       colchicine 0.6 MG tablet    COLCRYS    9 tablet    Take 2 tablets at the first sign of flare, take 1 additional tablet one hour later.    Acute gout of multiple sites, unspecified cause       ibuprofen 200 MG tablet    ADVIL/MOTRIN     Take 800 mg by mouth 3 times daily as needed for mild pain (RX given by Dr Chow at appt #90 with 1 refill) With food        indomethacin 25 MG capsule    INDOCIN    30 capsule    Take 1-2 capsules (25-50 mg) by mouth 3 times daily as needed (gout)    Acute gout of multiple sites, unspecified cause       * sildenafil 50 MG tablet    VIAGRA    6 tablet    Take 1/2 to 1 tablet daily as needed    Erectile dysfunction, unspecified erectile dysfunction type       * sildenafil 100 MG tablet    VIAGRA    150 tablet    Take 1 tablet 1 hour prior to sexual activity.  Do not take more than 1 tablet in 24 hours.    Erectile dysfunction,  unspecified erectile dysfunction type       testosterone 20.25 MG/ACT gel    ANDROGEL 1.62% PUMP    176 g    Place 3 pumps (60.75 mg) onto the skin every morning    Hypogonadism male       testosterone cypionate 200 MG/ML injection    DEPOTESTOTERONE    1 mL    Inject 1 mL (200 mg) into the muscle every 14 days for 15 days    Testicular hypofunction       * Notice:  This list has 2 medication(s) that are the same as other medications prescribed for you. Read the directions carefully, and ask your doctor or other care provider to review them with you.

## 2018-04-11 NOTE — NURSING NOTE
"Chief Complaint   Patient presents with     Consult     New patient low testosterone, Candido patient.        Initial /78  Pulse 77  Temp 96.8  F (36  C) (Tympanic)  Resp 20  Ht 1.803 m (5' 11\")  Wt 90.7 kg (200 lb)  SpO2 97%  BMI 27.89 kg/m2 Estimated body mass index is 27.89 kg/(m^2) as calculated from the following:    Height as of this encounter: 1.803 m (5' 11\").    Weight as of this encounter: 90.7 kg (200 lb).  Medication Reconciliation: complete   Review of Systems:    Weight loss:    No     Recent fever/chills:  No   Night sweats:   No  Current skin rash:  No   Recent hair loss:  No  Heat intolerance:  No   Cold intolerance:  No  Chest pain:   No   Palpitations:   No  Shortness of breath:  No   Wheezing:   No  Constipation:    No   Diarrhea:   No   Nausea:   No   Vomiting:   No   Kidney/side pain:  No   Back pain:   No  Frequent headaches:  No   Dizziness:     No  Leg swelling:   No   Calf pain:    No    Parents, brothers or sisters with history of kidney cancer?   No  Parents, brothers or sisters with history of bladder cancer: No      "

## 2018-04-11 NOTE — PROGRESS NOTES
Type of Visit  NPV    Chief Complaint  Hypogonadism  ED    HPI  Mr. Jenkins is a 52 year old male who presents with concerns regarding hypogonadism and ED..  Primary symptoms include low libido and energy.  Symptoms started 1.5 years ago.  He started short acting injections 200mg q2 weeks and noticed significant improvement in libido and mild/moderate improvement in energy.  Issues have been progressively worsening over this timeframe.    Also with ED.  Has tried Viagra with benefit.  He takes 50mg per dose with good results for the last year.      Past Medical History  He  has a past medical history of Gout, unspecified (12/3/2010) and Unspecified hearing loss (12/3/2010).  Patient Active Problem List   Diagnosis     Hearing loss     Gout, chronic     ACP (advance care planning)     Hyperlipidemia, unspecified hyperlipidemia type     Testicular hypofunction       Past Surgical History  He  has a past surgical history that includes inguinal hernia repair (1987); circumcision; vasectomy; ankle open reduction internal fixation; orthopedic surgery; hernia repair; and ORAL SURGERY PROCEDURE (Left).    Medications  He has a current medication list which includes the following prescription(s): atorvastatin, allopurinol, sildenafil, colchicine, indomethacin, ibuprofen, and testosterone cypionate.    Allergies  No Known Allergies    Social History  He  reports that he has never smoked. He has never used smokeless tobacco. He reports that he drinks alcohol. He reports that he does not use illicit drugs.  No drug abuse.    Family History  Family History   Problem Relation Age of Onset     C.A.D. Father      Dementia Father      Arthritis Brother      gout       Review of Systems  I personally reviewed the ROS with the patient.    Nursing Notes:   Zandra Andrews LPN  4/11/2018 11:15 AM  Signed  Chief Complaint   Patient presents with     Consult     New patient low testosterone, Candido patient.        Initial /78   "Pulse 77  Temp 96.8  F (36  C) (Tympanic)  Resp 20  Ht 1.803 m (5' 11\")  Wt 90.7 kg (200 lb)  SpO2 97%  BMI 27.89 kg/m2 Estimated body mass index is 27.89 kg/(m^2) as calculated from the following:    Height as of this encounter: 1.803 m (5' 11\").    Weight as of this encounter: 90.7 kg (200 lb).  Medication Reconciliation: complete   Review of Systems:    Weight loss:    No     Recent fever/chills:  No   Night sweats:   No  Current skin rash:  No   Recent hair loss:  No  Heat intolerance:  No   Cold intolerance:  No  Chest pain:   No   Palpitations:   No  Shortness of breath:  No   Wheezing:   No  Constipation:    No   Diarrhea:   No   Nausea:   No   Vomiting:   No   Kidney/side pain:  No   Back pain:   No  Frequent headaches:  No   Dizziness:     No  Leg swelling:   No   Calf pain:    No    Parents, brothers or sisters with history of kidney cancer?   No  Parents, brothers or sisters with history of bladder cancer: No        Physical Exam  Vitals:    04/11/18 1052   BP: 130/78   Pulse: 77   Resp: 20   Temp: 96.8  F (36  C)   TempSrc: Tympanic   SpO2: 97%   Weight: 90.7 kg (200 lb)   Height: 1.803 m (5' 11\")     Constitutional: No acute distress.  Alert and cooperative   Head: NCAT  Eyes: Conjunctivae normal  Cardiovascular: Regular rate.  Pulmonary/Chest: Respirations are even and non-labored bilaterally, no audible wheezing  Abdominal: Soft. No distension, tenderness, masses or guarding.   Neurological: A + O x 3.  Cranial Nerves II-XII grossly intact.  Extremities: JO-ANN x 4, Warm. No clubbing.  No cyanosis.    Skin: Pink, warm and dry.  No visible rashes noted.  Psychiatric:  Normal mood and affect  Back:  No left CVA tenderness.  No right CVA tenderness.  Genitourinary:  Nonpalpable bladder    Labs  Results for LIZABETH ALEXANDER (MRN [id number]) as of 4/11/2018 11:15   Ref. Range 11/7/2016 09:05 6/22/2017 09:41 9/22/2017 09:56 3/7/2018 07:54   Sex Hormone Binding Globulin Latest Ref Range: 11 - 80 nmol/L 21 23 "     Testosterone Total Latest Ref Range: 240 - 950 ng/dL 290 [phone]   Free Testosterone Calculated Latest Ref Range: 4.7 - 24.4 ng/dL 7.20 21.84       Results for LIZABETH ALEXANDER (MRN [id number]) as of 4/11/2018 11:15   Ref. Range 3/7/2018 07:54   PSA Latest Ref Range: 0 - 4 ug/L 1.55       Assessment  Mr. Alexander is a 52 year old male with clinically symptomatic hypogonadism currently on short acting injections and ED.  Today we discussed the role testosterone plays for a male patient.     I have indicated that low testosterone can be associated with metabolic syndrome, erectile dysfunction, coronary artery disease, diabetes, depression, osteoporosis, fatigue, low sex drive, poor sleep, high cholesterol, increased abdominal fat, muscle loss, irritability, hot flashes, and inability to concentrate.     Discussed the risks and benefits of testosterone replacement including the need for ongoing monitoring with clinical symptoms and labs.  Discussed the recent controversy regarding testosterone replacement including the FDA and AUA position statement.    Treatment options were discussed including topical gel or patch, testosterone injections every 2 weeks or every 10 weeks and testosterone pellets placed in clinic every 4-5 months.    Discussed ED treatment options.    Plan  Androgel 1.62% 3 pumps daily  Rx for Viagra 100mg per dose  Follow up in 6 weeks with T, HCT prior

## 2018-04-16 ENCOUNTER — TELEPHONE (OUTPATIENT)
Dept: UROLOGY | Facility: OTHER | Age: 53
End: 2018-04-16

## 2018-04-16 NOTE — TELEPHONE ENCOUNTER
Patient is requesting to change from Androgel to Aveed although he never started the Androgel due to cost.  He said he is around his grandkids to often and would like to be on something safer.  Rob Bajwa MD is OK with this.  To [facility] to check coverage and call patient once determination has been made.  Patient aware he will need to see Rob Bajwa MD in clinic at [facility].  Sybil Monae RN......April 16, 2018...3:55 PM

## 2018-04-18 DIAGNOSIS — G89.4 CHRONIC PAIN SYNDROME: Primary | ICD-10-CM

## 2018-04-18 RX ORDER — IBUPROFEN 200 MG
800 TABLET ORAL 3 TIMES DAILY PRN
Qty: 100 TABLET | Refills: 0 | Status: SHIPPED | OUTPATIENT
Start: 2018-04-18 | End: 2018-04-30

## 2018-04-18 NOTE — TELEPHONE ENCOUNTER
Ibuprofen   Last Office Visit: 4/11/2018  Last Refill Date:Rx never filled in Epic   # n/a          Refills  n/a      Thank you!

## 2018-04-20 ENCOUNTER — TELEPHONE (OUTPATIENT)
Dept: FAMILY MEDICINE | Facility: OTHER | Age: 53
End: 2018-04-20

## 2018-04-26 ENCOUNTER — TRANSFERRED RECORDS (OUTPATIENT)
Dept: HEALTH INFORMATION MANAGEMENT | Facility: CLINIC | Age: 53
End: 2018-04-26

## 2018-04-30 DIAGNOSIS — G89.4 CHRONIC PAIN SYNDROME: ICD-10-CM

## 2018-04-30 NOTE — TELEPHONE ENCOUNTER
ibuprofen (ADVIL/MOTRIN) 200 MG tablet      Last Written Prescription Date:  4/18/18  Last Fill Quantity: 100,   # refills: 0  Last Office Visit: 5/16/17  Future Office visit:    Next 5 appointments (look out 90 days)     May 22, 2018  1:30 PM CDT   (Arrive by 1:15 PM)   PHYSICAL with Anai Le MD   [facility] ([facility] )    [address]  [facility] [address]   [phone]            May 30, 2018  1:30 PM CDT   Return Visit with Rob Bajwa MD   [facility]    [address]  [facility] [id number] [phone]

## 2018-05-01 RX ORDER — IBUPROFEN 200 MG
800 TABLET ORAL 3 TIMES DAILY PRN
Qty: 100 TABLET | Refills: 0 | Status: SHIPPED | OUTPATIENT
Start: 2018-05-01 | End: 2020-07-06

## 2018-05-04 NOTE — TELEPHONE ENCOUNTER
Patient is calling back to check the status.  Please call him back at [phone].  Sybil Monae RN......May 4, 2018...9:59 AM

## 2018-05-14 ENCOUNTER — TELEPHONE (OUTPATIENT)
Dept: UROLOGY | Facility: OTHER | Age: 53
End: 2018-05-14

## 2018-05-14 ENCOUNTER — TELEPHONE (OUTPATIENT)
Dept: ALLERGY | Facility: OTHER | Age: 53
End: 2018-05-14

## 2018-05-14 NOTE — TELEPHONE ENCOUNTER
Spoke with pt's insurance company and they stated that Aveed is covered but subject to deductible.  I notified pt of this and he said he will clarify this with his insurance company.    Pt is preferring to have the injection at [facility] because he cannot come in on May 23 and doesn't want to wait until June 13.  SHAHZAD MANDEL

## 2018-05-15 NOTE — TELEPHONE ENCOUNTER
Will have [facility]  call patient to schedule an appointment.  Sybil Monae RN......May 15, 2018...7:42 AM

## 2018-05-15 NOTE — TELEPHONE ENCOUNTER
Please see phone note from 5/14/18.  Will have [facility]  call patient to schedule appointment.  Sybil Monae RN......May 15, 2018...7:41 AM

## 2018-05-18 ENCOUNTER — OFFICE VISIT (OUTPATIENT)
Dept: FAMILY MEDICINE | Facility: OTHER | Age: 53
End: 2018-05-18
Attending: NURSE PRACTITIONER
Payer: COMMERCIAL

## 2018-05-18 VITALS
BODY MASS INDEX: 28 KG/M2 | WEIGHT: 200 LBS | SYSTOLIC BLOOD PRESSURE: 114 MMHG | TEMPERATURE: 97.5 F | OXYGEN SATURATION: 98 % | HEART RATE: 61 BPM | DIASTOLIC BLOOD PRESSURE: 74 MMHG | HEIGHT: 71 IN

## 2018-05-18 DIAGNOSIS — H10.33 ACUTE BACTERIAL CONJUNCTIVITIS OF BOTH EYES: Primary | ICD-10-CM

## 2018-05-18 PROCEDURE — 99213 OFFICE O/P EST LOW 20 MIN: CPT | Performed by: NURSE PRACTITIONER

## 2018-05-18 RX ORDER — GENTAMICIN SULFATE 3 MG/ML
1 SOLUTION/ DROPS OPHTHALMIC 4 TIMES DAILY
Qty: 2 ML | Refills: 0 | Status: SHIPPED | OUTPATIENT
Start: 2018-05-18 | End: 2018-05-25

## 2018-05-18 ASSESSMENT — ANXIETY QUESTIONNAIRES
7. FEELING AFRAID AS IF SOMETHING AWFUL MIGHT HAPPEN: NOT AT ALL
2. NOT BEING ABLE TO STOP OR CONTROL WORRYING: SEVERAL DAYS
5. BEING SO RESTLESS THAT IT IS HARD TO SIT STILL: NOT AT ALL
IF YOU CHECKED OFF ANY PROBLEMS ON THIS QUESTIONNAIRE, HOW DIFFICULT HAVE THESE PROBLEMS MADE IT FOR YOU TO DO YOUR WORK, TAKE CARE OF THINGS AT HOME, OR GET ALONG WITH OTHER PEOPLE: SOMEWHAT DIFFICULT
6. BECOMING EASILY ANNOYED OR IRRITABLE: SEVERAL DAYS
GAD7 TOTAL SCORE: 3
1. FEELING NERVOUS, ANXIOUS, OR ON EDGE: NOT AT ALL
3. WORRYING TOO MUCH ABOUT DIFFERENT THINGS: SEVERAL DAYS

## 2018-05-18 ASSESSMENT — PAIN SCALES - GENERAL: PAINLEVEL: MODERATE PAIN (4)

## 2018-05-18 ASSESSMENT — PATIENT HEALTH QUESTIONNAIRE - PHQ9: 5. POOR APPETITE OR OVEREATING: NOT AT ALL

## 2018-05-18 NOTE — PATIENT INSTRUCTIONS
ASSESSMENT/PLAN:       1. Acute bacterial conjunctivitis of both eyes  Symptomatic   - gentamicin (GARAMYCIN) 0.3 % ophthalmic solution; Place 1 drop Into the left eye 4 times daily for 7 days  Dispense: 2 mL; Refill: 0    FUTURE APPOINTMENTS:       - Follow-up visit if no improvement or any worsening    Cyndie Jalloh NP  [facility] [place] [facility]  Conjunctivitis, Nonspecific    The membrane that covers the white part of your eye (the conjunctiva) is inflamed. Inflammation happens when your body responds to an injury, allergic reaction, infection, or illness. Symptoms of inflammation in the eye may include redness, irritation, itching, swelling, or burning. These symptoms should go away within the next 24 hours. Conjunctivitis may be related to a particle that was in your eye. If so, it may wash out with your tears or irrigation treatment. Being exposed to liquid chemicals or fumes may also cause this reaction.   Home care    Apply a cold pack over the eye for 20 minutes at a time. This will reduce pain. To make a cold pack, put ice cubes in a plastic bag that seals at the top. Wrap the bag in a clean, thin towel or cloth.    Artificial tears may be prescribed to reduce irritation or redness.  These should be used 3 to 4 times a day.    You may use acetaminophen or ibuprofen to control pain, unless another medicine was prescribed. (Note: If you have chronic liver or kidney disease, or if you have ever had a stomach ulcer or gastrointestinal bleeding, talk with your healthcare provider before using these medicines.)  Follow-up care  Follow up with your healthcare provider, or as advised.  When to seek medical advice  Call your healthcare provider right away if any of these occur:    Increased eyelid swelling    Increased eye pain    Increased redness or drainage from the eye    Increased blurry vision or increased sensitivity to light    Failure of normal vision to return within 24 to 48 hours  Date Last  Reviewed: 7/1/2017 2000-2017 [employer]. [address] [place] [address], [place] [address]. All rights reserved. This information is not intended as a substitute for professional medical care. Always follow your healthcare professional's instructions.

## 2018-05-18 NOTE — PROGRESS NOTES
SUBJECTIVE:   Mikey Jenkins is a 52 year old male who presents to clinic today for the following health issues:      Eye(s) Problem      Duration: 2 weeks       Description:  Location: left  Pain: YES- irritation  Redness: YES  Discharge: YES- occasionally     Accompanying signs and symptoms: Irritable and itchy    History (Trauma, foreign body exposure,): unknown    Precipitating or alleviating factors (contact use): None    Therapies tried and outcome: Eye drops, no relief        Problem list and histories reviewed & adjusted, as indicated.  Additional history: as documented    Patient Active Problem List   Diagnosis     Hearing loss     Gout, chronic     ACP (advance care planning)     Hyperlipidemia, unspecified hyperlipidemia type     Testicular hypofunction     Past Surgical History:   Procedure Laterality Date     ankle open reduction internal fixation      RT     C ORAL SURGERY PROCEDURE Left     wisdom tooth scaped      circumcision       HERNIA REPAIR      umbilical     inguinal hernia repair  1987     ORTHOPEDIC SURGERY      right ankle     vasectomy         Social History   Substance Use Topics     Smoking status: Never Smoker     Smokeless tobacco: Never Used     Alcohol use Yes      Comment: 4 beers, weekly     Family History   Problem Relation Age of Onset     C.A.D. Father      Dementia Father      Arthritis Brother      gout         Current Outpatient Prescriptions   Medication Sig Dispense Refill     allopurinol (ZYLOPRIM) 300 MG tablet TAKE 1 TABLET(300 MG) BY MOUTH DAILY 90 tablet 1     atorvastatin (LIPITOR) 40 MG tablet TAKE 1 TABLET BY MOUTH EVERY DAY 90 tablet 1     colchicine (COLCRYS) 0.6 MG tablet Take 2 tablets at the first sign of flare, take 1 additional tablet one hour later. 9 tablet 0     ibuprofen (ADVIL/MOTRIN) 200 MG tablet Take 4 tablets (800 mg) by mouth 3 times daily as needed for mild pain (RX given by Dr Chow at appt #90 with 1 refill) With food 100 tablet 0     indomethacin  "(INDOCIN) 25 MG capsule Take 1-2 capsules (25-50 mg) by mouth 3 times daily as needed (gout) 30 capsule 2     sildenafil (REVATIO/VIAGRA) 50 MG cap/tab Take 1/2 to 1 tablet daily as needed 6 tablet 11     sildenafil (VIAGRA) 100 MG tablet Take 1 tablet 1 hour prior to sexual activity.  Do not take more than 1 tablet in 24 hours. 150 tablet 0     No Known Allergies  Recent Labs   Lab Test  09/22/17   0956  05/16/17   1139  11/07/16   0905  08/08/16   0913   LDL  45  37  Cannot estimate LDL when triglyceride exceeds 400 mg/dL  Cannot estimate LDL when triglyceride exceeds 400 mg/dL   HDL  28*  30*  29*  25*   TRIG  275*  223*  412*  679*   ALT  38  30  62   --    TSH   --    --    --   10.99*      BP Readings from Last 3 Encounters:   05/18/18 114/74   04/11/18 130/78   06/28/17 124/68    Wt Readings from Last 3 Encounters:   05/18/18 200 lb (90.7 kg)   04/11/18 200 lb (90.7 kg)   06/28/17 215 lb (97.5 kg)                    Reviewed and updated as needed this visit by clinical staff  Tobacco  Allergies  Meds  Med Hx  Surg Hx  Fam Hx  Soc Hx      Reviewed and updated as needed this visit by Provider         ROS:  Constitutional, HEENT, cardiovascular, pulmonary, gi and gu systems are negative, except as otherwise noted.    OBJECTIVE:     /74 (BP Location: Left arm, Patient Position: Chair, Cuff Size: Adult Regular)  Pulse 61  Temp 97.5  F (36.4  C) (Tympanic)  Ht 5' 11\" (1.803 m)  Wt 200 lb (90.7 kg)  SpO2 98%  BMI 27.89 kg/m2  Body mass index is 27.89 kg/(m^2).  GENERAL: healthy, alert and no distress  EYES: right eye normal, left with thick yellow discharge in inner and outer canthus, sclera erythematous.   NECK: no adenopathy, no asymmetry, masses, or scars and thyroid normal to palpation  RESP: lungs clear to auscultation - no rales, rhonchi or wheezes  CV: regular rate and rhythm, normal S1 S2, no S3 or S4, no murmur, click or rub, no peripheral edema and peripheral pulses strong  MS: no gross " musculoskeletal defects noted, no edema  NEURO: Normal strength and tone, mentation intact and speech normal  PSYCH: mentation appears normal, affect normal/bright      ASSESSMENT/PLAN:       1. Acute bacterial conjunctivitis of both eyes  Symptomatic   - gentamicin (GARAMYCIN) 0.3 % ophthalmic solution; Place 1 drop Into the left eye 4 times daily for 7 days  Dispense: 2 mL; Refill: 0    FUTURE APPOINTMENTS:       - Follow-up visit if no improvement or any worsening    Cyndie Jalloh, NP  [facility]

## 2018-05-18 NOTE — MR AVS SNAPSHOT
After Visit Summary   5/18/2018    Mikey Jenkins    MRN: [id number]           Patient Information     Date Of Birth          1965        Visit Information        Provider Department      5/18/2018 8:30 AM Cyndie Jalloh NP [facility]        Today's Diagnoses     Acute bacterial conjunctivitis of both eyes    -  1      Care Instructions      ASSESSMENT/PLAN:       1. Acute bacterial conjunctivitis of both eyes  Symptomatic   - gentamicin (GARAMYCIN) 0.3 % ophthalmic solution; Place 1 drop Into the left eye 4 times daily for 7 days  Dispense: 2 mL; Refill: 0    FUTURE APPOINTMENTS:       - Follow-up visit if no improvement or any worsening    Cyndie Jalloh NP  [facility]  Conjunctivitis, Nonspecific    The membrane that covers the white part of your eye (the conjunctiva) is inflamed. Inflammation happens when your body responds to an injury, allergic reaction, infection, or illness. Symptoms of inflammation in the eye may include redness, irritation, itching, swelling, or burning. These symptoms should go away within the next 24 hours. Conjunctivitis may be related to a particle that was in your eye. If so, it may wash out with your tears or irrigation treatment. Being exposed to liquid chemicals or fumes may also cause this reaction.   Home care    Apply a cold pack over the eye for 20 minutes at a time. This will reduce pain. To make a cold pack, put ice cubes in a plastic bag that seals at the top. Wrap the bag in a clean, thin towel or cloth.    Artificial tears may be prescribed to reduce irritation or redness.  These should be used 3 to 4 times a day.    You may use acetaminophen or ibuprofen to control pain, unless another medicine was prescribed. (Note: If you have chronic liver or kidney disease, or if you have ever had a stomach ulcer or gastrointestinal bleeding, talk with your healthcare provider before using these medicines.)  Follow-up  care  Follow up with your healthcare provider, or as advised.  When to seek medical advice  Call your healthcare provider right away if any of these occur:    Increased eyelid swelling    Increased eye pain    Increased redness or drainage from the eye    Increased blurry vision or increased sensitivity to light    Failure of normal vision to return within 24 to 48 hours  Date Last Reviewed: 7/1/2017 2000-2017 [employer]. [address] [place] [address]. All rights reserved. This information is not intended as a substitute for professional medical care. Always follow your healthcare professional's instructions.                Follow-ups after your visit        Your next 10 appointments already scheduled     May 22, 2018  1:30 PM CDT   (Arrive by 1:15 PM)   PHYSICAL with Anai Le MD   [facility] ([facility] )    [address]  [facility] [address]   [phone]            May 30, 2018  1:30 PM CDT   Return Visit with Rob Bajwa MD   [facility] ([facility])    [address]  [place] [address] [phone]              Who to contact     If you have questions or need follow up information about today's clinic visit or your schedule please contact [facility] directly at [phone].  Normal or non-critical lab and imaging results will be communicated to you by MyChart, letter or phone within 4 business days after the clinic has received the results. If you do not hear from us within 7 days, please contact the clinic through MyChart or phone. If you have a critical or abnormal lab result, we will notify you by phone as soon as possible.  Submit refill requests through [employer] or call your pharmacy and they will forward the refill request to us. Please allow 3 business days for your refill to be completed.          Additional Information About Your  "Visit        Expert Information     Expert lets you send messages to your doctor, view your test results, renew your prescriptions, schedule appointments and more. To sign up, go to [email or web address] . Click on \"Log in\" on the left side of the screen, which will take you to the Welcome page. Then click on \"Sign up Now\" on the right side of the page.     You will be asked to enter the access code listed below, as well as some personal information. Please follow the directions to create your username and password.     Your access code is: [email or web address]  Expires: 7/10/2018 11:31 AM     Your access code will  in 90 days. If you need help or a new code, please call your [facility] clinic or [phone].        Care EveryWhere ID     This is your Care EveryWhere ID. This could be used by other organizations to access your [facility] medical records  [id number]        Your Vitals Were     Pulse Temperature Height Pulse Oximetry BMI (Body Mass Index)       61 97.5  F (36.4  C) (Tympanic) 5' 11\" (1.803 m) 98% 27.89 kg/m2        Blood Pressure from Last 3 Encounters:   18 114/74   18 130/78   17 124/68    Weight from Last 3 Encounters:   18 200 lb (90.7 kg)   18 200 lb (90.7 kg)   17 215 lb (97.5 kg)              Today, you had the following     No orders found for display         Today's Medication Changes          These changes are accurate as of 18  9:10 AM.  If you have any questions, ask your nurse or doctor.               Start taking these medicines.        Dose/Directions    gentamicin 0.3 % ophthalmic solution   Commonly known as:  GARAMYCIN   Used for:  Acute bacterial conjunctivitis of both eyes   Started by:  Cyndie Jalloh NP        Dose:  1 drop   Place 1 drop Into the left eye 4 times daily for 7 days   Quantity:  2 mL   Refills:  0            Where to get your medicines      These medications were sent to [facility] [address] [place] [address] " - [address]  AT [facility] [address]  [address] , [place] [address]     Phone:  [phone]     gentamicin 0.3 % ophthalmic solution                Primary Care Provider Office Phone # Fax #    Anai CALLOWAY [place] [phone] [address] [facility] [address]        Equal Access to Services     KANDI FUENTES : Hadii aad ku hadasho Soomaali, waaxda luqadaha, qaybta kaalmada adeegyada, waxay idiin hayaan adeeg hayleyjulienramya la'joy ah. So [facility] [phone].    ATENCIÓN: Si habla español, tiene a hayes disposición servicios gratuitos de asistencia lingüística. Atiya al [phone].    We comply with applicable federal civil rights laws and [place] laws. We do not discriminate on the basis of race, color, national origin, age, disability, sex, sexual orientation, or gender identity.            Thank you!     Thank you for choosing [facility]  for your care. Our goal is always to provide you with excellent care. Hearing back from our patients is one way we can continue to improve our services. Please take a few minutes to complete the written survey that you may receive in the mail after your visit with us. Thank you!             Your Updated Medication List - Protect others around you: Learn how to safely use, store and throw away your medicines at www.disposemymeds.org.          This list is accurate as of 5/18/18  9:10 AM.  Always use your most recent med list.                   Brand Name Dispense Instructions for use Diagnosis    allopurinol 300 MG tablet    ZYLOPRIM    90 tablet    TAKE 1 TABLET(300 MG) BY MOUTH DAILY    Idiopathic gout, unspecified chronicity, unspecified site       atorvastatin 40 MG tablet    LIPITOR    90 tablet    TAKE 1 TABLET BY MOUTH EVERY DAY    Hyperlipidemia, unspecified hyperlipidemia type       colchicine 0.6 MG tablet    COLCRYS    9 tablet    Take 2 tablets at the first sign of flare, take 1 additional tablet one  hour later.    Acute gout of multiple sites, unspecified cause       gentamicin 0.3 % ophthalmic solution    GARAMYCIN    2 mL    Place 1 drop Into the left eye 4 times daily for 7 days    Acute bacterial conjunctivitis of both eyes       ibuprofen 200 MG tablet    ADVIL/MOTRIN    100 tablet    Take 4 tablets (800 mg) by mouth 3 times daily as needed for mild pain (RX given by Dr Chow at appt #90 with 1 refill) With food    Chronic pain syndrome       indomethacin 25 MG capsule    INDOCIN    30 capsule    Take 1-2 capsules (25-50 mg) by mouth 3 times daily as needed (gout)    Acute gout of multiple sites, unspecified cause       * sildenafil 50 MG tablet    VIAGRA    6 tablet    Take 1/2 to 1 tablet daily as needed    Erectile dysfunction, unspecified erectile dysfunction type       * sildenafil 100 MG tablet    VIAGRA    150 tablet    Take 1 tablet 1 hour prior to sexual activity.  Do not take more than 1 tablet in 24 hours.    Erectile dysfunction, unspecified erectile dysfunction type       * Notice:  This list has 2 medication(s) that are the same as other medications prescribed for you. Read the directions carefully, and ask your doctor or other care provider to review them with you.

## 2018-05-19 ASSESSMENT — ANXIETY QUESTIONNAIRES: GAD7 TOTAL SCORE: 3

## 2018-05-19 ASSESSMENT — PATIENT HEALTH QUESTIONNAIRE - PHQ9: SUM OF ALL RESPONSES TO PHQ QUESTIONS 1-9: 4

## 2018-05-30 ENCOUNTER — OFFICE VISIT (OUTPATIENT)
Dept: UROLOGY | Facility: OTHER | Age: 53
End: 2018-05-30
Attending: UROLOGY
Payer: COMMERCIAL

## 2018-05-30 VITALS — BODY MASS INDEX: 28.54 KG/M2 | HEART RATE: 84 BPM | WEIGHT: 204.6 LBS | RESPIRATION RATE: 16 BRPM

## 2018-05-30 DIAGNOSIS — E29.1 TESTICULAR HYPOFUNCTION: Primary | ICD-10-CM

## 2018-05-30 PROCEDURE — 25000128 H RX IP 250 OP 636: Performed by: UROLOGY

## 2018-05-30 PROCEDURE — 96372 THER/PROPH/DIAG INJ SC/IM: CPT

## 2018-05-30 PROCEDURE — 99213 OFFICE O/P EST LOW 20 MIN: CPT | Performed by: UROLOGY

## 2018-05-30 RX ADMIN — TESTOSTERONE UNDECANOATE 750 MG: 250 INJECTION INTRAMUSCULAR at 13:50

## 2018-05-30 NOTE — MR AVS SNAPSHOT
"              After Visit Summary   5/30/2018    Mikey Jenkins    MRN: [id number]           Patient Information     Date Of Birth          1965        Visit Information        Provider Department      5/30/2018 1:30 PM Rob Bajwa MD [facility]        Today's Diagnoses     Testicular hypofunction    -  1       Follow-ups after your visit        Your next 10 appointments already scheduled     Jun 28, 2018 11:15 AM CDT   Return Visit with Rob Bajwa MD   [facility] ([facility])    [address]  [place] [address] [phone]            Aug 20, 2018  1:30 PM CDT   (Arrive by 1:15 PM)   PHYSICAL with Anai Le MD   [facility] ([facility] )    [address]  [facility] [address]   [phone]              Who to contact     If you have questions or need follow up information about today's clinic visit or your schedule please contact [facility] directly at [phone].  Normal or non-critical lab and imaging results will be communicated to you by CellARidehart, letter or phone within 4 business days after the clinic has received the results. If you do not hear from us within 7 days, please contact the clinic through Nonobat or phone. If you have a critical or abnormal lab result, we will notify you by phone as soon as possible.  Submit refill requests through [employer]ALP or call your pharmacy and they will forward the refill request to us. Please allow 3 business days for your refill to be completed.          Additional Information About Your Visit        MyChart Information     ZALP lets you send messages to your doctor, view your test results, renew your prescriptions, schedule appointments and more. To sign up, go to [email or web address] . Click on \"Log in\" on the left side of the screen, which will take you to the Welcome page. Then click on " "\"Sign up Now\" on the right side of the page.     You will be asked to enter the access code listed below, as well as some personal information. Please follow the directions to create your username and password.     Your access code is: [email or web address]  Expires: 7/10/2018 11:31 AM     Your access code will  in 90 days. If you need help or a new code, please call your [facility] clinic or [phone].        Care EveryWhere ID     This is your Care EveryWhere ID. This could be used by other organizations to access your [facility] medical records  [id number]        Your Vitals Were     Pulse Respirations BMI (Body Mass Index)             84 16 28.54 kg/m2          Blood Pressure from Last 3 Encounters:   18 114/74   18 130/78   17 124/68    Weight from Last 3 Encounters:   18 92.8 kg (204 lb 9.6 oz)   18 90.7 kg (200 lb)   18 90.7 kg (200 lb)              Today, you had the following     No orders found for display       Primary Care Provider Office Phone # Fax #    Anai Le [phone] [address] [facility] [address]        Equal Access to Services     KANDI FUENTES : Hadii celina moniqueo Somagalieali, waaxda luqadaha, qaybta kaalmada adeegyada, maria eugenia lazar . So [facility] [phone].    ATENCIÓN: Si habla español, tiene a hayes disposición servicios gratuitos de asistencia lingüística. ame al [phone].    We comply with applicable federal civil rights laws and [place] laws. We do not discriminate on the basis of race, color, national origin, age, disability, sex, sexual orientation, or gender identity.            Thank you!     Thank you for choosing [facility]  for your care. Our goal is always to provide you with excellent care. Hearing back from our patients is one way we can continue to improve our services. Please take a few minutes to complete the written survey that you may " receive in the mail after your visit with us. Thank you!             Your Updated Medication List - Protect others around you: Learn how to safely use, store and throw away your medicines at www.disposemymeds.org.          This list is accurate as of 5/30/18  3:56 PM.  Always use your most recent med list.                   Brand Name Dispense Instructions for use Diagnosis    allopurinol 300 MG tablet    ZYLOPRIM    90 tablet    TAKE 1 TABLET(300 MG) BY MOUTH DAILY    Idiopathic gout, unspecified chronicity, unspecified site       atorvastatin 40 MG tablet    LIPITOR    90 tablet    TAKE 1 TABLET BY MOUTH EVERY DAY    Hyperlipidemia, unspecified hyperlipidemia type       colchicine 0.6 MG tablet    COLCRYS    9 tablet    Take 2 tablets at the first sign of flare, take 1 additional tablet one hour later.    Acute gout of multiple sites, unspecified cause       ibuprofen 200 MG tablet    ADVIL/MOTRIN    100 tablet    Take 4 tablets (800 mg) by mouth 3 times daily as needed for mild pain (RX given by Dr Chow at appt #90 with 1 refill) With food    Chronic pain syndrome       indomethacin 25 MG capsule    INDOCIN    30 capsule    Take 1-2 capsules (25-50 mg) by mouth 3 times daily as needed (gout)    Acute gout of multiple sites, unspecified cause       * sildenafil 50 MG tablet    VIAGRA    6 tablet    Take 1/2 to 1 tablet daily as needed    Erectile dysfunction, unspecified erectile dysfunction type       * sildenafil 100 MG tablet    VIAGRA    150 tablet    Take 1 tablet 1 hour prior to sexual activity.  Do not take more than 1 tablet in 24 hours.    Erectile dysfunction, unspecified erectile dysfunction type       * Notice:  This list has 2 medication(s) that are the same as other medications prescribed for you. Read the directions carefully, and ask your doctor or other care provider to review them with you.

## 2018-05-30 NOTE — PROGRESS NOTES
"Type of Visit  EST    Chief Complaint  Hypogonadism  ED    HPI  Mr. Jenkins is a 52 year old male who follows up with concerns regarding hypogonadism and ED.  His primary symptoms are low libido and energy.  The symptoms started 1-1/2 years ago.  He started short acting injections 200 mg every 2 weeks.  Rapid cycling however resulted in less improvement in symptoms.  After discussing the treatment options we elected to proceed with Aveed.    Also with ED.  Has tried Viagra with benefit.  He takes 50mg per dose with good results for the last year.      Review of Systems  I personally reviewed the ROS with the patient.    Nursing Notes:   Saniya Sutton LPN  5/30/2018  3:53 PM  Addendum  Pt presents to clinic for Aveed injection\Patient given \"Aveed Treatment: A Patient Guide\" form.  Injection given and patient in clinic for 30 minutes after injection for monitoring.  Patient tolerated injection with no problems.  Saniya Sutton LPN.........5/30/2018...1:51 PM    Review of Systems:    Weight loss:    No     Recent fever/chills:  No   Night sweats:   No  Current skin rash:  No   Recent hair loss:  No  Heat intolerance:  No   Cold intolerance:  No  Chest pain:   No   Palpitations:   No  Shortness of breath:  No   Wheezing:   No  Constipation:    No   Diarrhea:   No   Nausea:   No   Vomiting:   No   Kidney/side pain:  No   Back pain:   No  Frequent headaches:  No   Dizziness:     No  Leg swelling:   No   Calf pain:    No        Physical Exam  Vitals:    05/30/18 1337   Pulse: 84   Resp: 16   Weight: 92.8 kg (204 lb 9.6 oz)     Constitutional: No acute distress.  Alert and cooperative   Head: NCAT  Eyes: Conjunctivae normal  Cardiovascular: Regular rate.  Pulmonary/Chest: Respirations are even and non-labored bilaterally, no audible wheezing  Abdominal: Soft. No distension, tenderness, masses or guarding.   Neurological: A + O x 3.  Cranial Nerves II-XII grossly intact.  Extremities: JO-ANN x 4, Warm. No clubbing.  No " cyanosis.    Skin: Pink, warm and dry.  No visible rashes noted.  Psychiatric:  Normal mood and affect  Back:  No left CVA tenderness.  No right CVA tenderness.  Genitourinary:  Nonpalpable bladder    Labs  Results for LIZABETH ALEXANDER (MRN [id number]) as of 4/11/2018 11:15   Ref. Range 11/7/2016 09:05 6/22/2017 09:41 9/22/2017 09:56 3/7/2018 07:54   Sex Hormone Binding Globulin Latest Ref Range: 11 - 80 nmol/L 21 23     Testosterone Total Latest Ref Range: 240 - 950 ng/dL 290 798 736 871   Free Testosterone Calculated Latest Ref Range: 4.7 - 24.4 ng/dL 7.20 21.84       Results for LIZABETH ALEXANDER (MRN [id number]) as of 4/11/2018 11:15   Ref. Range 3/7/2018 07:54   PSA Latest Ref Range: 0 - 4 ug/L 1.55       Testosterone Injection  Today the patient received an injection of testosterone undecanoate 750mg.  This was given in the gluteus.  The results of this injection: None  Patient was monitored for 30 minutes following the injection.    Assessment  Mr. Alexander is a 52 year old male with clinically symptomatic hypogonadism currently on short acting injections and ED.  Patient would like to proceed with Aveed  Discussed risks of anaphylaxis ( 2/3556) and pulmonary embolism (8/3556)    Plan  Aveed 750mg IM in 4 weeks, then every 10 weeks thereafter.

## 2018-05-30 NOTE — NURSING NOTE
"Pt presents to clinic for Aveed injection\Patient given \"Aveed Treatment: A Patient Guide\" form.  Injection given and patient in clinic for 30 minutes after injection for monitoring.  Patient tolerated injection with no problems.  Saniya Sutton LPN.........5/30/2018...1:51 PM    Review of Systems:    Weight loss:    No     Recent fever/chills:  No   Night sweats:   No  Current skin rash:  No   Recent hair loss:  No  Heat intolerance:  No   Cold intolerance:  No  Chest pain:   No   Palpitations:   No  Shortness of breath:  No   Wheezing:   No  Constipation:    No   Diarrhea:   No   Nausea:   No   Vomiting:   No   Kidney/side pain:  No   Back pain:   No  Frequent headaches:  No   Dizziness:     No  Leg swelling:   No   Calf pain:    No        "

## 2018-06-28 ENCOUNTER — OFFICE VISIT (OUTPATIENT)
Dept: UROLOGY | Facility: OTHER | Age: 53
End: 2018-06-28
Attending: UROLOGY
Payer: COMMERCIAL

## 2018-06-28 VITALS — WEIGHT: 212 LBS | HEART RATE: 88 BPM | BODY MASS INDEX: 29.57 KG/M2 | RESPIRATION RATE: 16 BRPM

## 2018-06-28 DIAGNOSIS — M10.00 IDIOPATHIC GOUT, UNSPECIFIED CHRONICITY, UNSPECIFIED SITE: ICD-10-CM

## 2018-06-28 DIAGNOSIS — N52.9 ERECTILE DYSFUNCTION, UNSPECIFIED ERECTILE DYSFUNCTION TYPE: ICD-10-CM

## 2018-06-28 DIAGNOSIS — E29.1 MALE HYPOGONADISM: Primary | ICD-10-CM

## 2018-06-28 PROCEDURE — 96372 THER/PROPH/DIAG INJ SC/IM: CPT

## 2018-06-28 PROCEDURE — 25000128 H RX IP 250 OP 636: Performed by: UROLOGY

## 2018-06-28 PROCEDURE — 99213 OFFICE O/P EST LOW 20 MIN: CPT | Performed by: UROLOGY

## 2018-06-28 RX ADMIN — TESTOSTERONE UNDECANOATE 750 MG: 250 INJECTION INTRAMUSCULAR at 11:36

## 2018-06-28 ASSESSMENT — PAIN SCALES - GENERAL: PAINLEVEL: NO PAIN (0)

## 2018-06-28 NOTE — PROGRESS NOTES
Type of Visit  EST    Chief Complaint  Hypogonadism  ED    HPI  Mr. Jenkins is a 52 year old male who follows up with concerns regarding hypogonadism and ED.  His primary symptoms are low libido and energy.  The symptoms started almost 2 years ago.  He started short acting injections 200 mg every 2 weeks.  Rapid cycling however resulted in less improvement in symptoms.  After discussing the treatment options we elected to proceed with Aveed.  He follows up today for his second injection.    Also with ED.  Has tried Viagra with good benefit.  He takes 50mg per dose with acceptable results for the last year.  No side effects.      Review of Systems  I personally reviewed the ROS with the patient.    Nursing Notes:   Saniya Sutton, LPN  6/28/2018 11:30 AM  Addendum  Pt presents to clinic for Aveed injection  Review of Systems:    Weight loss:    No     Recent fever/chills:  No   Night sweats:   No  Current skin rash:  No   Recent hair loss:  No  Heat intolerance:  No   Cold intolerance:  No  Chest pain:   No   Palpitations:   No  Shortness of breath:  No   Wheezing:   No  Constipation:    No   Diarrhea:   No   Nausea:   No   Vomiting:   No   Kidney/side pain:  No   Back pain:   No  Frequent headaches:  No   Dizziness:     No  Leg swelling:   No   Calf pain:    No        Physical Exam  Vitals:    06/28/18 1130   Pulse: 88   Resp: 16   Weight: 96.2 kg (212 lb)     Constitutional: No acute distress.  Alert and cooperative   Head: NCAT  Eyes: Conjunctivae normal  Cardiovascular: Regular rate.  Pulmonary/Chest: Respirations are even and non-labored bilaterally, no audible wheezing  Abdominal: Soft. No distension, tenderness, masses or guarding.   Neurological: A + O x 3.  Cranial Nerves II-XII grossly intact.  Extremities: JO-ANN x 4, Warm. No clubbing.  No cyanosis.    Skin: Pink, warm and dry.  No visible rashes noted.  Psychiatric:  Normal mood and affect  Back:  No left CVA tenderness.  No right CVA  tenderness.  Genitourinary:  Nonpalpable bladder    Labs  Results for LIZABETH ALEXANDER (MRN [id number]) as of 4/11/2018 11:15   11/7/2016 09:05 6/22/2017 09:41 9/22/2017 09:56 3/7/2018 07:54   Sex Hormone Binding Globulin 21 23     Testosterone Total 290 798 736 871   Free Testosterone Calculated 7.20 21.84       Results for LIZABETH ALEXANDER (MRN [id number]) as of 4/11/2018 11:15   Ref. Range 3/7/2018 07:54   PSA Latest Ref Range: 0 - 4 ug/L 1.55       Testosterone Injection  Today the patient received an injection of testosterone undecanoate 750mg.  This was given in the gluteus.  The results of this injection: None  Patient was monitored for 30 minutes following the injection.    Assessment  Mr. Alexander is a 52 year old male with clinically symptomatic hypogonadism currently receiving Aveed and ED.  Patient would like to continue with Aveed    Plan  Continue Viagra  Aveed 750mg IM every 10 weeks thereafter.

## 2018-06-28 NOTE — NURSING NOTE
"Patient given \"Aveed Treatment: A Patient Guide\" form.  Injection given and patient in clinic for 30 minutes after injection for monitoring.  Patient tolerated injection with no problems.  Sybil Monae RN.........6/28/2018...1:03 PM  "

## 2018-06-28 NOTE — NURSING NOTE
Pt presents to clinic for Aveed injection  Review of Systems:    Weight loss:    No     Recent fever/chills:  No   Night sweats:   No  Current skin rash:  No   Recent hair loss:  No  Heat intolerance:  No   Cold intolerance:  No  Chest pain:   No   Palpitations:   No  Shortness of breath:  No   Wheezing:   No  Constipation:    No   Diarrhea:   No   Nausea:   No   Vomiting:   No   Kidney/side pain:  No   Back pain:   No  Frequent headaches:  No   Dizziness:     No  Leg swelling:   No   Calf pain:    No

## 2018-06-28 NOTE — MR AVS SNAPSHOT
After Visit Summary   6/28/2018    Mikey Jenkins    MRN: [id number]           Patient Information     Date Of Birth          1965        Visit Information        Provider Department      6/28/2018 11:15 AM Rob Bajwa MD [facility]        Today's Diagnoses     Male hypogonadism    -  1    Erectile dysfunction, unspecified erectile dysfunction type           Follow-ups after your visit        Your next 10 appointments already scheduled     Aug 20, 2018  1:30 PM CDT   (Arrive by 1:15 PM)   PHYSICAL with Anai Le MD   [facility] ([facility] )    [address]  [facility] [address]   [phone]            Sep 10, 2018 11:15 AM CDT   Return Visit with Rob Bajwa MD   [facility] ([facility])    [address]  [place] [address] [phone]              Who to contact     If you have questions or need follow up information about today's clinic visit or your schedule please contact [facility] directly at [phone].  Normal or non-critical lab and imaging results will be communicated to you by MyChart, letter or phone within 4 business days after the clinic has received the results. If you do not hear from us within 7 days, please contact the clinic through MyChart or phone. If you have a critical or abnormal lab result, we will notify you by phone as soon as possible.  Submit refill requests through [facility] or call your pharmacy and they will forward the refill request to us. Please allow 3 business days for your refill to be completed.          Additional Information About Your Visit        Care EveryWhere ID     This is your Care EveryWhere ID. This could be used by other organizations to access your [facility] medical records  [id number]        Your Vitals Were     Pulse Respirations BMI (Body Mass Index)             88 16 29.57  kg/m2          Blood Pressure from Last 3 Encounters:   05/18/18 114/74   04/11/18 130/78   06/28/17 124/68    Weight from Last 3 Encounters:   06/28/18 96.2 kg (212 lb)   05/30/18 92.8 kg (204 lb 9.6 oz)   05/18/18 90.7 kg (200 lb)              Today, you had the following     No orders found for display       Primary Care Provider Office Phone # Fax #    Anai Le [phone] [address] [facility] [address]        Equal Access to Services     [facility]: Hadii celina Tong, waterence murrell, ene estevez, maria eugenia lazar . So [facility] [phone].    ATENCIÓN: Si habla español, tiene a hayes disposición servicios gratuitos de asistencia lingüística. [facility] [phone].    We comply with applicable federal civil rights laws and [place] laws. We do not discriminate on the basis of race, color, national origin, age, disability, sex, sexual orientation, or gender identity.            Thank you!     Thank you for choosing [facility] AND [facility]  for your care. Our goal is always to provide you with excellent care. Hearing back from our patients is one way we can continue to improve our services. Please take a few minutes to complete the written survey that you may receive in the mail after your visit with us. Thank you!             Your Updated Medication List - Protect others around you: Learn how to safely use, store and throw away your medicines at www.disposemymeds.org.          This list is accurate as of 6/28/18  4:02 PM.  Always use your most recent med list.                   Brand Name Dispense Instructions for use Diagnosis    allopurinol 300 MG tablet    ZYLOPRIM    90 tablet    TAKE 1 TABLET(300 MG) BY MOUTH DAILY    Idiopathic gout, unspecified chronicity, unspecified site       atorvastatin 40 MG tablet    LIPITOR    90 tablet    TAKE 1 TABLET BY MOUTH EVERY DAY    Hyperlipidemia, unspecified  hyperlipidemia type       colchicine 0.6 MG tablet    COLCRYS    9 tablet    Take 2 tablets at the first sign of flare, take 1 additional tablet one hour later.    Acute gout of multiple sites, unspecified cause       ibuprofen 200 MG tablet    ADVIL/MOTRIN    100 tablet    Take 4 tablets (800 mg) by mouth 3 times daily as needed for mild pain (RX given by Dr Chow at appt #90 with 1 refill) With food    Chronic pain syndrome       indomethacin 25 MG capsule    INDOCIN    30 capsule    Take 1-2 capsules (25-50 mg) by mouth 3 times daily as needed (gout)    Acute gout of multiple sites, unspecified cause       * sildenafil 50 MG tablet    VIAGRA    6 tablet    Take 1/2 to 1 tablet daily as needed    Erectile dysfunction, unspecified erectile dysfunction type       * sildenafil 100 MG tablet    VIAGRA    150 tablet    Take 1 tablet 1 hour prior to sexual activity.  Do not take more than 1 tablet in 24 hours.    Erectile dysfunction, unspecified erectile dysfunction type       * Notice:  This list has 2 medication(s) that are the same as other medications prescribed for you. Read the directions carefully, and ask your doctor or other care provider to review them with you.

## 2018-06-29 RX ORDER — SILDENAFIL CITRATE 50 MG
TABLET ORAL
Qty: 6 TABLET | Refills: 0 | Status: SHIPPED | OUTPATIENT
Start: 2018-06-29 | End: 2018-08-20

## 2018-06-29 RX ORDER — ALLOPURINOL 300 MG/1
TABLET ORAL
Qty: 90 TABLET | Refills: 0 | Status: SHIPPED | OUTPATIENT
Start: 2018-06-29 | End: 2018-11-08

## 2018-08-16 NOTE — PROGRESS NOTES
SUBJECTIVE:   CC: Mikey Jenkins is an 52 year old male who presents for preventative health visit.     Healthy Habits:    Do you get at least three servings of calcium containing foods daily (dairy, green leafy vegetables, etc.)? yes    Amount of exercise or daily activities, outside of work: 4 day(s) per week    Problems taking medications regularly Yes pt states he sometimes forgets    Medication side effects: No    Have you had an eye exam in the past two years? Pt states he is unsure    Do you see a dentist twice per year? yes    Do you have sleep apnea, excessive snoring or daytime drowsiness?yes-apnea       Hyperlipidemia Follow-Up      Rate your low fat/cholesterol diet?: good    Taking statin?  Yes, no muscle aches from statin    Other lipid medications/supplements?:  none      Today's PHQ-2 Score:   PHQ-2 ( 1999 Pfizer) 9/20/2013   Q1: Little interest or pleasure in doing things 0   Q2: Feeling down, depressed or hopeless 0   PHQ-2 Score 0       Abuse: Current or Past(Physical, Sexual or Emotional)- No  Do you feel safe in your environment - Yes    Social History   Substance Use Topics     Smoking status: Never Smoker     Smokeless tobacco: Never Used     Alcohol use Yes      Comment: 4 beers, weekly      If you drink alcohol do you typically have >3 drinks per day or >7 drinks per week? No                      Last PSA:   PSA   Date Value Ref Range Status   03/07/2018 1.55 0 - 4 ug/L Final     Comment:     Assay Method:  Chemiluminescence using Siemens Vista analyzer       Reviewed orders with patient. Reviewed health maintenance and updated orders accordingly - Yes  Patient Active Problem List   Diagnosis     Hearing loss     Gout, chronic     ACP (advance care planning)     Hyperlipidemia, unspecified hyperlipidemia type     Erectile dysfunction, unspecified erectile dysfunction type     Male hypogonadism     Past Surgical History:   Procedure Laterality Date     ankle open reduction internal fixation       RT     C ORAL SURGERY PROCEDURE Left     wisdom tooth scaped      circumcision       HERNIA REPAIR      umbilical     inguinal hernia repair  1987     ORTHOPEDIC SURGERY      right ankle     vasectomy         Social History   Substance Use Topics     Smoking status: Never Smoker     Smokeless tobacco: Never Used     Alcohol use Yes      Comment: 4 beers, weekly     Family History   Problem Relation Age of Onset     C.A.D. Father      Dementia Father      Arthritis Brother      gout         Current Outpatient Prescriptions   Medication Sig Dispense Refill     allopurinol (ZYLOPRIM) 300 MG tablet TAKE 1 TABLET(300 MG) BY MOUTH DAILY 90 tablet 0     atorvastatin (LIPITOR) 40 MG tablet Take 1 tablet (40 mg) by mouth daily 90 tablet 3     colchicine (COLCRYS) 0.6 MG tablet Take 2 tablets at the first sign of flare, take 1 additional tablet one hour later. 9 tablet 0     ibuprofen (ADVIL/MOTRIN) 200 MG tablet Take 4 tablets (800 mg) by mouth 3 times daily as needed for mild pain (RX given by Dr Chow at appt #90 with 1 refill) With food 100 tablet 0     indomethacin (INDOCIN) 25 MG capsule Take 1-2 capsules (25-50 mg) by mouth 3 times daily as needed (gout) 30 capsule 2     sildenafil (VIAGRA) 100 MG tablet Take 1 tablet 1 hour prior to sexual activity.  Do not take more than 1 tablet in 24 hours. 150 tablet 0     [DISCONTINUED] atorvastatin (LIPITOR) 40 MG tablet TAKE 1 TABLET BY MOUTH EVERY DAY 90 tablet 1     [DISCONTINUED] VIAGRA 50 MG tablet TAKE ONE-HALF TO 1 TABLET BY MOUTH EVERY DAY AS NEEDED 6 tablet 0     No Known Allergies    Reviewed and updated as needed this visit by clinical staff  Tobacco  Allergies  Meds  Med Hx  Surg Hx  Fam Hx  Soc Hx        Reviewed and updated as needed this visit by Provider            ROS:  CONSTITUTIONAL: NEGATIVE for fever, chills, change in weight  INTEGUMENTARY/SKIN: NEGATIVE for worrisome rashes, moles or lesions  EYES: NEGATIVE for vision changes or irritation  ENT:  "NEGATIVE for ear, mouth and throat problems  RESP: NEGATIVE for significant cough or SOB  CV: NEGATIVE for chest pain, palpitations or peripheral edema  GI: NEGATIVE for nausea, abdominal pain, heartburn, or change in bowel habits   male: negative for dysuria, hematuria, decreased urinary stream, erectile dysfunction, urethral discharge  MUSCULOSKELETAL: NEGATIVE for significant arthralgias or myalgia  NEURO: NEGATIVE for weakness, dizziness or paresthesias  PSYCHIATRIC: NEGATIVE for changes in mood or affect    OBJECTIVE:   /76 (BP Location: Right arm, Patient Position: Sitting, Cuff Size: Adult Regular)  Pulse 65  Temp 97.1  F (36.2  C) (Tympanic)  Resp 16  Ht 5' 10.75\" (1.797 m)  Wt 221 lb 12.8 oz (100.6 kg)  SpO2 96%  BMI 31.15 kg/m2  EXAM:  GENERAL: healthy, alert and no distress  EYES: Eyes grossly normal to inspection, PERRL and conjunctivae and sclerae normal  HENT: ear canals and TM's normal, nose and mouth without ulcers or lesions  NECK: no adenopathy  RESP: lungs clear to auscultation - no rales, rhonchi or wheezes  CV: regular rate and rhythm, normal S1 S2, no S3 or S4, no murmur, click or rub, no peripheral edema and peripheral pulses strong  ABDOMEN: soft, nontender, no hepatosplenomegaly, no masses and bowel sounds normal  MS: no gross musculoskeletal defects noted, no edema  SKIN: no suspicious lesions or rashes  NEURO: Normal strength and tone, mentation intact and speech normal  PSYCH: mentation appears normal, affect normal/bright    Diagnostic Test Results:  none     ASSESSMENT/PLAN:       ICD-10-CM    1. Routine general medical examination at a health care facility Z00.00    2. Hyperlipidemia, unspecified hyperlipidemia type E78.5 Lipid Profile     ALT     atorvastatin (LIPITOR) 40 MG tablet   3. Chronic gout of multiple sites, unspecified cause [id number].09X0 Uric acid     CBC with platelets       COUNSELING:  Reviewed preventive health counseling, as reflected in patient " "instructions    BP Readings from Last 1 Encounters:   08/20/18 132/76     Estimated body mass index is 31.15 kg/(m^2) as calculated from the following:    Height as of this encounter: 5' 10.75\" (1.797 m).    Weight as of this encounter: 221 lb 12.8 oz (100.6 kg).           reports that he has never smoked. He has never used smokeless tobacco.      Counseling Resources:  ATP IV Guidelines  Pooled Cohorts Equation Calculator  FRAX Risk Assessment  ICSI Preventive Guidelines  Dietary Guidelines for Americans, 2010  USDA's MyPlate  ASA Prophylaxis  Lung CA Screening      Anai Le MD  [facility] [place] [facility]  "

## 2018-08-20 ENCOUNTER — OFFICE VISIT (OUTPATIENT)
Dept: FAMILY MEDICINE | Facility: OTHER | Age: 53
End: 2018-08-20
Attending: FAMILY MEDICINE
Payer: COMMERCIAL

## 2018-08-20 VITALS
TEMPERATURE: 97.1 F | HEIGHT: 71 IN | RESPIRATION RATE: 16 BRPM | SYSTOLIC BLOOD PRESSURE: 132 MMHG | WEIGHT: 221.8 LBS | BODY MASS INDEX: 31.05 KG/M2 | HEART RATE: 65 BPM | DIASTOLIC BLOOD PRESSURE: 76 MMHG | OXYGEN SATURATION: 96 %

## 2018-08-20 DIAGNOSIS — M1A.09X0 CHRONIC GOUT OF MULTIPLE SITES, UNSPECIFIED CAUSE: ICD-10-CM

## 2018-08-20 DIAGNOSIS — E78.5 HYPERLIPIDEMIA, UNSPECIFIED HYPERLIPIDEMIA TYPE: ICD-10-CM

## 2018-08-20 DIAGNOSIS — Z00.00 ROUTINE GENERAL MEDICAL EXAMINATION AT A HEALTH CARE FACILITY: Primary | ICD-10-CM

## 2018-08-20 LAB
ALT SERPL W P-5'-P-CCNC: 41 U/L (ref 0–70)
CHOLEST SERPL-MCNC: 145 MG/DL
ERYTHROCYTE [DISTWIDTH] IN BLOOD BY AUTOMATED COUNT: 13.6 % (ref 10–15)
HCT VFR BLD AUTO: 47.8 % (ref 40–53)
HDLC SERPL-MCNC: 28 MG/DL
HGB BLD-MCNC: 16.8 G/DL (ref 13.3–17.7)
LDLC SERPL CALC-MCNC: ABNORMAL MG/DL
MCH RBC QN AUTO: 31.3 PG (ref 26.5–33)
MCHC RBC AUTO-ENTMCNC: 35.1 G/DL (ref 31.5–36.5)
MCV RBC AUTO: 89 FL (ref 78–100)
NONHDLC SERPL-MCNC: 117 MG/DL
PLATELET # BLD AUTO: 151 10E9/L (ref 150–450)
RBC # BLD AUTO: 5.36 10E12/L (ref 4.4–5.9)
TRIGL SERPL-MCNC: 500 MG/DL
URATE SERPL-MCNC: 7.7 MG/DL (ref 3.5–7.2)
WBC # BLD AUTO: 5.7 10E9/L (ref 4–11)

## 2018-08-20 PROCEDURE — 84550 ASSAY OF BLOOD/URIC ACID: CPT | Performed by: FAMILY MEDICINE

## 2018-08-20 PROCEDURE — 84460 ALANINE AMINO (ALT) (SGPT): CPT | Performed by: FAMILY MEDICINE

## 2018-08-20 PROCEDURE — 85027 COMPLETE CBC AUTOMATED: CPT | Performed by: FAMILY MEDICINE

## 2018-08-20 PROCEDURE — 99396 PREV VISIT EST AGE 40-64: CPT | Performed by: FAMILY MEDICINE

## 2018-08-20 PROCEDURE — 80061 LIPID PANEL: CPT | Performed by: FAMILY MEDICINE

## 2018-08-20 PROCEDURE — 36415 COLL VENOUS BLD VENIPUNCTURE: CPT | Performed by: FAMILY MEDICINE

## 2018-08-20 RX ORDER — ATORVASTATIN CALCIUM 40 MG/1
40 TABLET, FILM COATED ORAL DAILY
Qty: 90 TABLET | Refills: 3 | Status: SHIPPED | OUTPATIENT
Start: 2018-08-20 | End: 2019-10-03

## 2018-08-20 ASSESSMENT — PAIN SCALES - GENERAL: PAINLEVEL: NO PAIN (0)

## 2018-08-20 NOTE — MR AVS SNAPSHOT
After Visit Summary   8/20/2018    Mikey Jenkins    MRN: [id number]           Patient Information     Date Of Birth          1965        Visit Information        Provider Department      8/20/2018 1:30 PM Anai Le MD [facility]        Today's Diagnoses     Routine general medical examination at a health care facility    -  1    Hyperlipidemia, unspecified hyperlipidemia type        Chronic gout of multiple sites, unspecified cause          Care Instructions      Preventive Health Recommendations  Male Ages 50 - 64    Yearly exam:             See your health care provider every year in order to  o   Review health changes.   o   Discuss preventive care.    o   Review your medicines if your doctor has prescribed any.     Have a cholesterol test every 5 years, or more frequently if you are at risk for high cholesterol/heart disease.     Have a diabetes test (fasting glucose) every three years. If you are at risk for diabetes, you should have this test more often.     Have a colonoscopy at age 50, or have a yearly FIT test (stool test). These exams will check for colon cancer.      Talk with your health care provider about whether or not a prostate cancer screening test (PSA) is right for you.    You should be tested each year for STDs (sexually transmitted diseases), if you re at risk.     Shots: Get a flu shot each year. Get a tetanus shot every 10 years.     Nutrition:    Eat at least 5 servings of fruits and vegetables daily.     Eat whole-grain bread, whole-wheat pasta and brown rice instead of white grains and rice.     Get adequate Calcium and Vitamin D.     Lifestyle    Exercise for at least 150 minutes a week (30 minutes a day, 5 days a week). This will help you control your weight and prevent disease.     Limit alcohol to one drink per day.     No smoking.     Wear sunscreen to prevent skin cancer.     See your dentist every six months for an exam and cleaning.  "    See your eye doctor every 1 to 2 years.            Follow-ups after your visit        Follow-up notes from your care team     Return in about 1 year (around 8/20/2019).      Your next 10 appointments already scheduled     Sep 10, 2018 11:15 AM CDT   Return Visit with Rob Bajwa MD   [facility] ([facility])    [address]  [place] [id number] [phone]              Who to contact     If you have questions or need follow up information about today's clinic visit or your schedule please contact [facility] directly at [phone].  Normal or non-critical lab and imaging results will be communicated to you by MyChart, letter or phone within 4 business days after the clinic has received the results. If you do not hear from us within 7 days, please contact the clinic through MyChart or phone. If you have a critical or abnormal lab result, we will notify you by phone as soon as possible.  Submit refill requests through [facility] or call your pharmacy and they will forward the refill request to us. Please allow 3 business days for your refill to be completed.          Additional Information About Your Visit        Care EveryWhere ID     This is your Care EveryWhere ID. This could be used by other organizations to access your [facility] medical records  [id number]        Your Vitals Were     Pulse Temperature Respirations Height Pulse Oximetry BMI (Body Mass Index)    65 97.1  F (36.2  C) (Tympanic) 16 5' 10.75\" (1.797 m) 96% 31.15 kg/m2       Blood Pressure from Last 3 Encounters:   08/20/18 132/76   05/18/18 114/74   04/11/18 130/78    Weight from Last 3 Encounters:   08/20/18 221 lb 12.8 oz (100.6 kg)   06/28/18 212 lb (96.2 kg)   05/30/18 204 lb 9.6 oz (92.8 kg)              We Performed the Following     ALT     CBC with platelets     Lipid Profile     Uric acid          Today's Medication Changes          These changes are accurate as of " 8/20/18  2:03 PM.  If you have any questions, ask your nurse or doctor.               These medicines have changed or have updated prescriptions.        Dose/Directions    atorvastatin 40 MG tablet   Commonly known as:  LIPITOR   This may have changed:  See the new instructions.   Used for:  Hyperlipidemia, unspecified hyperlipidemia type   Changed by:  Anai Le MD        Dose:  40 mg   Take 1 tablet (40 mg) by mouth daily   Quantity:  90 tablet   Refills:  3            Where to get your medicines      These medications were sent to [facility] [address] [address]  AT [facility] [address] [facility] [address]     Phone:  [phone]     atorvastatin 40 MG tablet                Primary Care Provider Office Phone # Fax #    Anai Le [phone] [address] [facility] [address]        Equal Access to Services     [facility]: Hadii celina ku hadasho Soomaali, waaxda luqadaha, qaybta kaalmada adeegyada, waxay marielin hayaan shelly lazar . So [facility] [phone].    ATENCIÓN: Si habla español, tiene a hayes disposición servicios gratuitos de asistencia lingüística. Llame al [phone].    We comply with applicable federal civil rights laws and [place] laws. We do not discriminate on the basis of race, color, national origin, age, disability, sex, sexual orientation, or gender identity.            Thank you!     Thank you for choosing [facility]  for your care. Our goal is always to provide you with excellent care. Hearing back from our patients is one way we can continue to improve our services. Please take a few minutes to complete the written survey that you may receive in the mail after your visit with us. Thank you!             Your Updated Medication List - Protect others around you: Learn how to safely use, store and throw away your medicines at www.disposemymeds.org.           This list is accurate as of 8/20/18  2:03 PM.  Always use your most recent med list.                   Brand Name Dispense Instructions for use Diagnosis    allopurinol 300 MG tablet    ZYLOPRIM    90 tablet    TAKE 1 TABLET(300 MG) BY MOUTH DAILY    Idiopathic gout, unspecified chronicity, unspecified site       atorvastatin 40 MG tablet    LIPITOR    90 tablet    Take 1 tablet (40 mg) by mouth daily    Hyperlipidemia, unspecified hyperlipidemia type       colchicine 0.6 MG tablet    COLCRYS    9 tablet    Take 2 tablets at the first sign of flare, take 1 additional tablet one hour later.    Acute gout of multiple sites, unspecified cause       ibuprofen 200 MG tablet    ADVIL/MOTRIN    100 tablet    Take 4 tablets (800 mg) by mouth 3 times daily as needed for mild pain (RX given by Dr Chow at appt #90 with 1 refill) With food    Chronic pain syndrome       indomethacin 25 MG capsule    INDOCIN    30 capsule    Take 1-2 capsules (25-50 mg) by mouth 3 times daily as needed (gout)    Acute gout of multiple sites, unspecified cause       sildenafil 100 MG tablet    VIAGRA    150 tablet    Take 1 tablet 1 hour prior to sexual activity.  Do not take more than 1 tablet in 24 hours.    Erectile dysfunction, unspecified erectile dysfunction type

## 2018-08-20 NOTE — NURSING NOTE
"Chief Complaint   Patient presents with     Physical       Initial /76 (BP Location: Right arm, Patient Position: Sitting, Cuff Size: Adult Regular)  Pulse 65  Temp 97.1  F (36.2  C) (Tympanic)  Resp 16  Ht 5' 10.75\" (1.797 m)  Wt 221 lb 12.8 oz (100.6 kg)  SpO2 96%  BMI 31.15 kg/m2 Estimated body mass index is 31.15 kg/(m^2) as calculated from the following:    Height as of this encounter: 5' 10.75\" (1.797 m).    Weight as of this encounter: 221 lb 12.8 oz (100.6 kg).  Medication Reconciliation: complete    Yen Whitten MA  "

## 2018-09-10 ENCOUNTER — OFFICE VISIT (OUTPATIENT)
Dept: UROLOGY | Facility: OTHER | Age: 53
End: 2018-09-10
Attending: UROLOGY
Payer: COMMERCIAL

## 2018-09-10 VITALS
WEIGHT: 217.2 LBS | DIASTOLIC BLOOD PRESSURE: 80 MMHG | BODY MASS INDEX: 30.51 KG/M2 | RESPIRATION RATE: 14 BRPM | SYSTOLIC BLOOD PRESSURE: 122 MMHG

## 2018-09-10 DIAGNOSIS — E29.1 MALE HYPOGONADISM: Primary | ICD-10-CM

## 2018-09-10 PROCEDURE — 96372 THER/PROPH/DIAG INJ SC/IM: CPT

## 2018-09-10 PROCEDURE — 99213 OFFICE O/P EST LOW 20 MIN: CPT | Performed by: UROLOGY

## 2018-09-10 PROCEDURE — 25000128 H RX IP 250 OP 636: Performed by: UROLOGY

## 2018-09-10 RX ADMIN — TESTOSTERONE UNDECANOATE 750 MG: 250 INJECTION INTRAMUSCULAR at 11:38

## 2018-09-10 ASSESSMENT — PAIN SCALES - GENERAL: PAINLEVEL: NO PAIN (0)

## 2018-09-10 NOTE — MR AVS SNAPSHOT
After Visit Summary   9/10/2018    Mikey Jenkins    MRN: [id number]           Patient Information     Date Of Birth          1965        Visit Information        Provider Department      9/10/2018 11:15 AM Rob Bajwa MD [facility]        Today's Diagnoses     Male hypogonadism    -  1       Follow-ups after your visit        Your next 10 appointments already scheduled     Nov 19, 2018 11:00 AM CST   Return Visit with Rob Bajwa MD   [facility] ([facility])    [address]  [place] [id number] [phone]              Future tests that were ordered for you today     Open Future Orders        Priority Expected Expires Ordered    Hematocrit Routine 10/29/2018 9/9/2019 9/10/2018    Prostate Specific Antigen GH Routine 10/29/2018 9/9/2019 9/10/2018    Testosterone total Routine 10/29/2018 9/9/2019 9/10/2018            Who to contact     If you have questions or need follow up information about today's clinic visit or your schedule please contact [facility] [place] directly at [phone].  Normal or non-critical lab and imaging results will be communicated to you by MyChart, letter or phone within 4 business days after the clinic has received the results. If you do not hear from us within 7 days, please contact the clinic through MyChart or phone. If you have a critical or abnormal lab result, we will notify you by phone as soon as possible.  Submit refill requests through "[employer]t or call your pharmacy and they will forward the refill request to us. Please allow 3 business days for your refill to be completed.          Additional Information About Your Visit        Care EveryWhere ID     This is your Care EveryWhere ID. This could be used by other organizations to access your [facility] medical records  [id number]        Your Vitals Were     Respirations BMI (Body Mass Index)                14 30.51  kg/m2           Blood Pressure from Last 3 Encounters:   09/10/18 122/80   08/20/18 132/76   05/18/18 114/74    Weight from Last 3 Encounters:   09/10/18 98.5 kg (217 lb 3.2 oz)   08/20/18 100.6 kg (221 lb 12.8 oz)   06/28/18 96.2 kg (212 lb)               Primary Care Provider Office Phone # Fax #    Anai Le [phone] [address] [facility] [address]        Equal Access to Services     [facility]: Hadii celina esteves Sotraci, waaxda luqadaha, qaybta kaalmalos estevez, maria eugenia lazar . So [facility] [phone].    ATENCIÓN: Si habla español, tiene a hayes disposición servicios gratuitos de asistencia lingüística. [facility] [phone].    We comply with applicable federal civil rights laws and [place] laws. We do not discriminate on the basis of race, color, national origin, age, disability, sex, sexual orientation, or gender identity.            Thank you!     Thank you for choosing [facility] AND [facility]  for your care. Our goal is always to provide you with excellent care. Hearing back from our patients is one way we can continue to improve our services. Please take a few minutes to complete the written survey that you may receive in the mail after your visit with us. Thank you!             Your Updated Medication List - Protect others around you: Learn how to safely use, store and throw away your medicines at www.disposemymeds.org.          This list is accurate as of 9/10/18  1:30 PM.  Always use your most recent med list.                   Brand Name Dispense Instructions for use Diagnosis    allopurinol 300 MG tablet    ZYLOPRIM    90 tablet    TAKE 1 TABLET(300 MG) BY MOUTH DAILY    Idiopathic gout, unspecified chronicity, unspecified site       atorvastatin 40 MG tablet    LIPITOR    90 tablet    Take 1 tablet (40 mg) by mouth daily    Hyperlipidemia, unspecified hyperlipidemia type       colchicine 0.6 MG tablet     COLCRYS    9 tablet    Take 2 tablets at the first sign of flare, take 1 additional tablet one hour later.    Acute gout of multiple sites, unspecified cause       ibuprofen 200 MG tablet    ADVIL/MOTRIN    100 tablet    Take 4 tablets (800 mg) by mouth 3 times daily as needed for mild pain (RX given by Dr Chow at appt #90 with 1 refill) With food    Chronic pain syndrome       indomethacin 25 MG capsule    INDOCIN    30 capsule    Take 1-2 capsules (25-50 mg) by mouth 3 times daily as needed (gout)    Acute gout of multiple sites, unspecified cause       sildenafil 100 MG tablet    VIAGRA    150 tablet    Take 1 tablet 1 hour prior to sexual activity.  Do not take more than 1 tablet in 24 hours.    Erectile dysfunction, unspecified erectile dysfunction type

## 2018-09-10 NOTE — PROGRESS NOTES
"Type of Visit  EST    Chief Complaint  Hypogonadism  ED    HPI  Mr. Jenkins is a 52 year old male who follows up with concerns regarding hypogonadism and ED.  His primary symptoms are low libido and energy.  The symptoms started 2 years ago.  He started short acting injections 200 mg every 2 weeks.  Rapid cycling however resulted in less improvement in symptoms.  After discussing the treatment options we elected to proceed with Aveed.  He follows up today for his third injection.  He reports significant improved libido.  He is quite satisfied with the benefit from the medication.  He denies side effects such as leg swelling or acne.    Also with ED.  Has tried Viagra with good benefit.  He takes 50mg per dose with acceptable results for the last year.  No side effects.      Review of Systems  I personally reviewed the ROS with the patient.    Nursing Notes:   Saniya Sutton LPN  9/10/2018 11:40 AM  Signed  Pt presents to clinic for 10 week aveed injection    Review of Systems:    Weight loss:    No     Recent fever/chills:  No   Night sweats:   No  Current skin rash:  No   Recent hair loss:  No  Heat intolerance:  No   Cold intolerance:  No  Chest pain:   No   Palpitations:   No  Shortness of breath:  No   Wheezing:   No  Constipation:    No   Diarrhea:   No   Nausea:   No   Vomiting:   No   Kidney/side pain:  No   Back pain:   No  Frequent headaches:  No   Dizziness:     No  Leg swelling:   No   Calf pain:    No    Patient given \"Aveed Treatment: A Patient Guide\" form.  Injection given and patient in clinic for 30 minutes after injection for monitoring.  Patient tolerated injection with no problems.  Saniya Sutton LPN .........9/10/2018...11:27 AM         Physical Exam  Vitals:    09/10/18 1128   BP: 122/80   BP Location: Left arm   Patient Position: Sitting   Cuff Size: Adult Regular   Resp: 14   Weight: 98.5 kg (217 lb 3.2 oz)     Constitutional: No acute distress.  Alert and cooperative   Head: NCAT  Eyes: " Conjunctivae normal  Cardiovascular: Regular rate.  Pulmonary/Chest: Respirations are even and non-labored bilaterally, no audible wheezing  Abdominal: Soft. No distension, tenderness, masses or guarding.   Neurological: A + O x 3.  Cranial Nerves II-XII grossly intact.  Extremities: JO-ANN x 4, Warm. No clubbing.  No cyanosis.    Skin: Pink, warm and dry.  No visible rashes noted.  Psychiatric:  Normal mood and affect  Back:  No left CVA tenderness.  No right CVA tenderness.  Genitourinary:  Nonpalpable bladder    Labs  Results for LIZABETH ALEXANDER (MRN [id number]) as of 4/11/2018 11:15   11/7/2016 09:05 6/22/2017 09:41 9/22/2017 09:56 3/7/2018 07:54   Sex Hormone Binding Globulin 21 23     Testosterone Total 290 798 736 871   Free Testosterone Calculated 7.20 21.84       Results for LIZABETH ALEXANDER (MRN [id number]) as of 4/11/2018 11:15   Ref. Range 3/7/2018 07:54   PSA Latest Ref Range: 0 - 4 ug/L 1.55       Testosterone Injection  Today the patient received an injection of testosterone undecanoate 750mg.  This was given in the gluteus.  The results of this injection: None  Patient was monitored for 30 minutes following the injection.    Assessment  Mr. Alexander is a 52 year old male with clinically symptomatic hypogonadism currently receiving Aveed and ED.  He is due for labs.    Plan  Continue Viagra  Labs in 8 weeks (HCT, T)  Aveed 750mg IM every 10 weeks.

## 2018-09-10 NOTE — NURSING NOTE
"Pt presents to clinic for 10 week aveed injection    Review of Systems:    Weight loss:    No     Recent fever/chills:  No   Night sweats:   No  Current skin rash:  No   Recent hair loss:  No  Heat intolerance:  No   Cold intolerance:  No  Chest pain:   No   Palpitations:   No  Shortness of breath:  No   Wheezing:   No  Constipation:    No   Diarrhea:   No   Nausea:   No   Vomiting:   No   Kidney/side pain:  No   Back pain:   No  Frequent headaches:  No   Dizziness:     No  Leg swelling:   No   Calf pain:    No    Patient given \"Aveed Treatment: A Patient Guide\" form.  Injection given and patient in clinic for 30 minutes after injection for monitoring.  Patient tolerated injection with no problems.  Saniya Sutton LPN .........9/10/2018...11:27 AM      "

## 2018-09-18 DIAGNOSIS — N52.9 ERECTILE DYSFUNCTION, UNSPECIFIED ERECTILE DYSFUNCTION TYPE: ICD-10-CM

## 2018-09-19 RX ORDER — SILDENAFIL 100 MG/1
TABLET, FILM COATED ORAL
Qty: 150 TABLET | Refills: 1 | Status: SHIPPED | OUTPATIENT
Start: 2018-09-19 | End: 2019-03-25

## 2018-09-19 NOTE — TELEPHONE ENCOUNTER
Per 9/10/18 Visit    Plan  Continue Viagra  Labs in 8 weeks (HCT, T)  Aveed 750mg IM every 10 weeks.

## 2018-11-06 DIAGNOSIS — E29.1 MALE HYPOGONADISM: ICD-10-CM

## 2018-11-06 LAB — HCT VFR BLD AUTO: 50.5 % (ref 40–53)

## 2018-11-06 PROCEDURE — 84403 ASSAY OF TOTAL TESTOSTERONE: CPT | Mod: 90 | Performed by: UROLOGY

## 2018-11-06 PROCEDURE — 36415 COLL VENOUS BLD VENIPUNCTURE: CPT | Performed by: UROLOGY

## 2018-11-06 PROCEDURE — 99000 SPECIMEN HANDLING OFFICE-LAB: CPT | Performed by: UROLOGY

## 2018-11-06 PROCEDURE — 85014 HEMATOCRIT: CPT | Performed by: UROLOGY

## 2018-11-08 DIAGNOSIS — M10.00 IDIOPATHIC GOUT, UNSPECIFIED CHRONICITY, UNSPECIFIED SITE: ICD-10-CM

## 2018-11-09 LAB — TESTOST SERPL-MCNC: 287 NG/DL (ref 240–950)

## 2018-11-09 RX ORDER — ALLOPURINOL 300 MG/1
TABLET ORAL
Qty: 90 TABLET | Refills: 0 | Status: SHIPPED | OUTPATIENT
Start: 2018-11-09 | End: 2018-12-31

## 2018-11-19 ENCOUNTER — OFFICE VISIT (OUTPATIENT)
Dept: UROLOGY | Facility: OTHER | Age: 53
End: 2018-11-19
Attending: UROLOGY
Payer: COMMERCIAL

## 2018-11-19 VITALS
WEIGHT: 214.2 LBS | SYSTOLIC BLOOD PRESSURE: 124 MMHG | BODY MASS INDEX: 30.09 KG/M2 | RESPIRATION RATE: 16 BRPM | DIASTOLIC BLOOD PRESSURE: 80 MMHG

## 2018-11-19 DIAGNOSIS — E29.1 MALE HYPOGONADISM: Primary | ICD-10-CM

## 2018-11-19 DIAGNOSIS — N52.9 ERECTILE DYSFUNCTION, UNSPECIFIED ERECTILE DYSFUNCTION TYPE: ICD-10-CM

## 2018-11-19 PROCEDURE — 25000128 H RX IP 250 OP 636: Performed by: UROLOGY

## 2018-11-19 PROCEDURE — 99214 OFFICE O/P EST MOD 30 MIN: CPT | Performed by: UROLOGY

## 2018-11-19 PROCEDURE — 96372 THER/PROPH/DIAG INJ SC/IM: CPT

## 2018-11-19 RX ORDER — SILDENAFIL CITRATE 20 MG/1
TABLET ORAL
Qty: 100 TABLET | Refills: 11 | Status: SHIPPED | OUTPATIENT
Start: 2018-11-19

## 2018-11-19 RX ADMIN — TESTOSTERONE UNDECANOATE 750 MG: 250 INJECTION INTRAMUSCULAR at 11:20

## 2018-11-19 ASSESSMENT — PAIN SCALES - GENERAL: PAINLEVEL: NO PAIN (0)

## 2018-11-19 NOTE — NURSING NOTE
"Pt presents to clinic for a 10 week Aveed injection    Review of Systems:    Weight loss:    No     Recent fever/chills:  No   Night sweats:   No  Current skin rash:  No   Recent hair loss:  No  Heat intolerance:  No   Cold intolerance:  No  Chest pain:   No   Palpitations:   No  Shortness of breath:  No   Wheezing:   No  Constipation:    No   Diarrhea:   No   Nausea:   No   Vomiting:   No   Kidney/side pain:  No   Back pain:   No  Frequent headaches:  No   Dizziness:     No  Leg swelling:   No   Calf pain:    No      Patient given \"Aveed Treatment: A Patient Guide\" form.  Injection given and patient in clinic for 30 minutes after injection for monitoring.  Patient tolerated injection with no problems.  Sainya Sutton LPN .........11/19/2018...11:19 AM    "

## 2018-11-19 NOTE — PROGRESS NOTES
"Type of Visit  EST    Chief Complaint  Hypogonadism  ED    HPI  Mr. Jenkins is a 53 year old male who follows up with concerns regarding hypogonadism and ED.  His primary symptoms are low libido and energy.  The symptoms started 2 years ago.  He started short acting injections 200 mg every 2 weeks.  Rapid cycling however resulted in less improvement in symptoms.  After discussing the treatment options we elected to proceed with Aveed.  Follows up today for an injection.  He underwent labs 2 weeks ago.  He reports significant improved libido.  He does describe excellent symptom management for approximately 8-8-1/2 weeks.  The last week and 1/2-2 weeks of the cycle his symptoms significantly return with low energy and drive.    Also with ED.  Has tried sildenafil 20 mg with good benefit.  He takes 50mg per dose with acceptable results for the last year.  No side effects.      Review of Systems  I personally reviewed the ROS with the patient.    Nursing Notes:   Saniya Sutton LPN  11/19/2018 11:41 AM  Signed  Pt presents to clinic for a 10 week Aveed injection    Review of Systems:    Weight loss:    No     Recent fever/chills:  No   Night sweats:   No  Current skin rash:  No   Recent hair loss:  No  Heat intolerance:  No   Cold intolerance:  No  Chest pain:   No   Palpitations:   No  Shortness of breath:  No   Wheezing:   No  Constipation:    No   Diarrhea:   No   Nausea:   No   Vomiting:   No   Kidney/side pain:  No   Back pain:   No  Frequent headaches:  No   Dizziness:     No  Leg swelling:   No   Calf pain:    No      Patient given \"Aveed Treatment: A Patient Guide\" form.  Injection given and patient in clinic for 30 minutes after injection for monitoring.  Patient tolerated injection with no problems.  Saniya Sutton LPN .........11/19/2018...11:19 AM       Physical Exam  Vitals:    11/19/18 1112   BP: 124/80   BP Location: Right arm   Patient Position: Sitting   Cuff Size: Adult Regular   Resp: 16 "   Weight: 97.2 kg (214 lb 3.2 oz)   Constitutional: No acute distress.  Alert and cooperative   Head: NCAT  Eyes: Conjunctivae normal  Cardiovascular: Regular rate.  Pulmonary/Chest: Respirations are even and non-labored bilaterally, no audible wheezing  Abdominal: Soft. No distension, tenderness, masses or guarding.   Neurological: A + O x 3.  Cranial Nerves II-XII grossly intact.  Extremities: JO-ANN x 4, Warm. No clubbing.  No cyanosis.    Skin: Pink, warm and dry.  No visible rashes noted.  Psychiatric:  Normal mood and affect  Back:  No left CVA tenderness.  No right CVA tenderness.  Genitourinary:  Nonpalpable bladder    Labs  Results for LIZABETH ALEXANDER (MRN [id number]) as of 11/19/2018 11:41   11/6/2018 14:37   Testosterone Total 287   Hematocrit 50.5     Results for LIZABETH ALEXANDER (MRN [id number]) as of 11/19/2018 11:41   3/7/2018 07:54   PSA 1.55   Testosterone Total 871     Results for LIZABETH ALEXANDER (MRN [id number]) as of 4/11/2018 11:15   11/7/2016 09:05 6/22/2017 09:41 9/22/2017 09:56 3/7/2018 07:54   Sex Hormone Binding Globulin 21 23     Testosterone Total 290 798 736 871   Free Testosterone Calculated 7.20 21.84       Results for LIZABETH ALEXANDER (MRN [id number]) as of 4/11/2018 11:15   3/7/2018 07:54   PSA 1.55       Testosterone Injection  Today the patient received an injection of testosterone undecanoate 750mg.  This was given in the gluteus.  The results of this injection: None  Patient was monitored for 30 minutes following the injection.    Assessment  Mr. Alexander is a 53 year old male with clinically symptomatic hypogonadism currently receiving Aveed and ED.  Will adjust frequency of AVEED to q9 weeks based on symptoms and lab results.    Plan  Continue sildenafil 20mg  Labs in 28 weeks (HCT, T)  Aveed 750mg IM every 9 weeks.

## 2018-11-19 NOTE — MR AVS SNAPSHOT
After Visit Summary   11/19/2018    Mikey Jenkins    MRN: [id number]           Patient Information     Date Of Birth          1965        Visit Information        Provider Department      11/19/2018 11:00 AM Rob Bajwa MD [facility]        Today's Diagnoses     Male hypogonadism    -  1    Erectile dysfunction, unspecified erectile dysfunction type           Follow-ups after your visit        Your next 10 appointments already scheduled     Jan 21, 2019 11:15 AM CST   [facility]    [address]  [place] [id number] [phone]              Who to contact     If you have questions or need follow up information about today's clinic visit or your schedule please contact [facility] directly at [phone].  Normal or non-critical lab and imaging results will be communicated to you by MyChart, letter or phone within 4 business days after the clinic has received the results. If you do not hear from us within 7 days, please contact the clinic through MyChart or phone. If you have a critical or abnormal lab result, we will notify you by phone as soon as possible.  Submit refill requests through [employer] or call your pharmacy and they will forward the refill request to us. Please allow 3 business days for your refill to be completed.          Additional Information About Your Visit        Care EveryWhere ID     This is your Care EveryWhere ID. This could be used by other organizations to access your [place] medical records  [id number]        Your Vitals Were     Respirations BMI (Body Mass Index)                16 30.09 kg/m2           Blood Pressure from Last 3 Encounters:   11/19/18 124/80   09/10/18 122/80   08/20/18 132/76    Weight from Last 3 Encounters:   11/19/18 97.2 kg (214 lb 3.2 oz)   09/10/18 98.5 kg (217 lb 3.2 oz)   08/20/18 100.6 kg  (221 lb 12.8 oz)              Today, you had the following     No orders found for display         Today's Medication Changes          These changes are accurate as of 11/19/18  3:34 PM.  If you have any questions, ask your nurse or doctor.               Start taking these medicines.        Dose/Directions    sildenafil 20 MG tablet   Commonly known as:  REVATIO   Used for:  Erectile dysfunction, unspecified erectile dysfunction type   Started by:  Rob Bajwa MD        Take 3-5 (60-100mg) tablets by mouth 1 hour prior to sexual activity   Quantity:  100 tablet   Refills:  11            Where to get your medicines      These medications were sent to [facility] [address] [place] [address] - [address]   [address] , [facility] [address]     Phone:  [phone]     sildenafil 20 MG tablet                Primary Care Provider Office Phone # Fax #    Anai ELLI Le [phone] [facility] [address]        Equal Access to Services     [facility] : Hadii celina maier hadasho Soomaali, waaxda luqadaha, qaybta kaalmada adeegyada, waxchava floydin karlie lazar . So [facility] [phone].    ATENCIÓN: Si habla español, tiene a hayes disposición servicios gratuitos de asistencia lingüística. [facility] [phone].    We comply with applicable federal civil rights laws and [place] laws. We do not discriminate on the basis of race, color, national origin, age, disability, sex, sexual orientation, or gender identity.            Thank you!     Thank you for choosing [facility] AND [facility]  for your care. Our goal is always to provide you with excellent care. Hearing back from our patients is one way we can continue to improve our services. Please take a few minutes to complete the written survey that you may receive in the mail after your visit with us. Thank you!             Your Updated Medication List - Protect others around you: Learn how  to safely use, store and throw away your medicines at www.disposemymeds.org.          This list is accurate as of 11/19/18  3:34 PM.  Always use your most recent med list.                   Brand Name Dispense Instructions for use Diagnosis    allopurinol 300 MG tablet    ZYLOPRIM    90 tablet    TAKE 1 TABLET(300 MG) BY MOUTH DAILY    Idiopathic gout, unspecified chronicity, unspecified site       atorvastatin 40 MG tablet    LIPITOR    90 tablet    Take 1 tablet (40 mg) by mouth daily    Hyperlipidemia, unspecified hyperlipidemia type       colchicine 0.6 MG tablet    COLCRYS    9 tablet    Take 2 tablets at the first sign of flare, take 1 additional tablet one hour later.    Acute gout of multiple sites, unspecified cause       ibuprofen 200 MG tablet    ADVIL/MOTRIN    100 tablet    Take 4 tablets (800 mg) by mouth 3 times daily as needed for mild pain (RX given by Dr Chow at appt #90 with 1 refill) With food    Chronic pain syndrome       indomethacin 25 MG capsule    INDOCIN    30 capsule    Take 1-2 capsules (25-50 mg) by mouth 3 times daily as needed (gout)    Acute gout of multiple sites, unspecified cause       sildenafil 100 MG tablet    VIAGRA    150 tablet    Take 1 tablet 1 hour prior to sexual activity.  Do not take more than 1 tablet in 24 hours.    Erectile dysfunction, unspecified erectile dysfunction type       sildenafil 20 MG tablet    REVATIO    100 tablet    Take 3-5 (60-100mg) tablets by mouth 1 hour prior to sexual activity    Erectile dysfunction, unspecified erectile dysfunction type

## 2018-12-31 DIAGNOSIS — M10.00 IDIOPATHIC GOUT, UNSPECIFIED CHRONICITY, UNSPECIFIED SITE: ICD-10-CM

## 2019-01-02 RX ORDER — ALLOPURINOL 300 MG/1
TABLET ORAL
Qty: 90 TABLET | Refills: 1 | Status: SHIPPED | OUTPATIENT
Start: 2019-01-02 | End: 2019-07-03

## 2019-01-21 ENCOUNTER — OFFICE VISIT (OUTPATIENT)
Dept: UROLOGY | Facility: OTHER | Age: 54
End: 2019-01-21
Attending: UROLOGY
Payer: COMMERCIAL

## 2019-01-21 VITALS
BODY MASS INDEX: 30.31 KG/M2 | DIASTOLIC BLOOD PRESSURE: 90 MMHG | RESPIRATION RATE: 14 BRPM | SYSTOLIC BLOOD PRESSURE: 138 MMHG | WEIGHT: 215.8 LBS

## 2019-01-21 DIAGNOSIS — E29.1 MALE HYPOGONADISM: Primary | ICD-10-CM

## 2019-01-21 DIAGNOSIS — E29.1 HYPOGONADISM IN MALE: Primary | ICD-10-CM

## 2019-01-21 PROCEDURE — 25000128 H RX IP 250 OP 636: Performed by: UROLOGY

## 2019-01-21 PROCEDURE — 99213 OFFICE O/P EST LOW 20 MIN: CPT | Performed by: UROLOGY

## 2019-01-21 PROCEDURE — 96372 THER/PROPH/DIAG INJ SC/IM: CPT

## 2019-01-21 RX ADMIN — TESTOSTERONE UNDECANOATE 750 MG: 250 INJECTION INTRAMUSCULAR at 11:33

## 2019-01-21 ASSESSMENT — PAIN SCALES - GENERAL: PAINLEVEL: NO PAIN (0)

## 2019-01-21 NOTE — NURSING NOTE
"Pt presents to clinic for Aveed injection    Review of Systems:    Weight loss:    No     Recent fever/chills:  No   Night sweats:   No  Current skin rash:  No   Recent hair loss:  No  Heat intolerance:  No   Cold intolerance:  No  Chest pain:   No   Palpitations:   No  Shortness of breath:  No   Wheezing:   No  Constipation:    No   Diarrhea:   No   Nausea:   No   Vomiting:   No   Kidney/side pain:  No   Back pain:   No  Frequent headaches:  No   Dizziness:     No  Leg swelling:   No   Calf pain:    No  Patient given \"Aveed Treatment: A Patient Guide\" form.  Injection given and patient in clinic for 30 minutes after injection for monitoring.  Patient tolerated injection with no problems.  Saniya Sutton LPN.........1/21/2019...11:23 AM      "

## 2019-01-21 NOTE — PROGRESS NOTES
"Type of Visit  EST    Chief Complaint  Hypogonadism  ED    HPI  Mr. Jenkins is a 53 year old male who follows up with concerns regarding hypogonadism and ED.  His primary symptoms are low libido and energy.  The symptoms started 3 years ago.  He initially started short acting injections but the rapid cycling of levels proved to be difficult.  He reports significant improved libido since starting AVEED.  He does describe excellent symptom management for approximately 8-8-1/2 weeks.  Because of this at the last visit we adjusted his cycle to every 9 weeks.    Also with ED.  Has tried sildenafil 20 mg with good benefit.  He takes 50mg per dose with acceptable results for the last year.  No side effects.      Review of Systems  I personally reviewed the ROS with the patient.    Nursing Notes:   Saniya Sutton LPN  1/21/2019 11:54 AM  Signed  Pt presents to clinic for Aveed injection    Review of Systems:    Weight loss:    No     Recent fever/chills:  No   Night sweats:   No  Current skin rash:  No   Recent hair loss:  No  Heat intolerance:  No   Cold intolerance:  No  Chest pain:   No   Palpitations:   No  Shortness of breath:  No   Wheezing:   No  Constipation:    No   Diarrhea:   No   Nausea:   No   Vomiting:   No   Kidney/side pain:  No   Back pain:   No  Frequent headaches:  No   Dizziness:     No  Leg swelling:   No   Calf pain:    No  Patient given \"Aveed Treatment: A Patient Guide\" form.  Injection given and patient in clinic for 30 minutes after injection for monitoring.  Patient tolerated injection with no problems.  Saniya Sutotn LPN.........1/21/2019...11:23 AM         Physical Exam  Vitals:    01/21/19 1125   BP: 138/90   BP Location: Left arm   Patient Position: Sitting   Cuff Size: Adult Regular   Resp: 14   Weight: 97.9 kg (215 lb 12.8 oz)   Constitutional: No acute distress.  Alert and cooperative   Head: NCAT  Eyes: Conjunctivae normal  Cardiovascular: Regular rate.  Pulmonary/Chest: " Respirations are even and non-labored bilaterally, no audible wheezing  Abdominal: Soft. No distension, tenderness, masses or guarding.   Extremities: JO-ANN x 4, Warm. No clubbing.  No cyanosis.    Skin: Pink, warm and dry.  No visible rashes noted.  Back:  No left CVA tenderness.  No right CVA tenderness.  Genitourinary:  Nonpalpable bladder    Labs  Results for LIZABETH ALEXANDER (MRN [id number]) as of 11/19/2018 11:41   11/6/2018 14:37   Testosterone Total 287   Hematocrit 50.5     Results for LIZABETH ALEXANDER (MRN [id number]) as of 11/19/2018 11:41   3/7/2018 07:54   PSA 1.55   Testosterone Total 871     Results for LIZABETH ALEXANDER (MRN [id number]) as of 4/11/2018 11:15   11/7/2016 09:05 6/22/2017 09:41 9/22/2017 09:56 3/7/2018 07:54   Sex Hormone Binding Globulin 21 23     Testosterone Total 290 798 736 871   Free Testosterone Calculated 7.20 21.84       Results for LIZABETH ALEXANDER (MRN [id number]) as of 4/11/2018 11:15   3/7/2018 07:54   PSA 1.55     Testosterone Injection  Today the patient received an injection of testosterone undecanoate 750mg.  This was given in the gluteus.  The results of this injection: None  Patient was monitored for 30 minutes following the injection.    Assessment  Mr. Alexander is a 53 year old male with clinically symptomatic hypogonadism currently receiving Aveed and ED.  Will adjust frequency of AVEED to q9 weeks based on symptoms and lab results.    Plan  Continue sildenafil 20mg  Labs in 2 cycles (HCT, T)  Aveed 750mg IM every 9 weeks.

## 2019-02-01 ENCOUNTER — OFFICE VISIT (OUTPATIENT)
Dept: FAMILY MEDICINE | Facility: OTHER | Age: 54
End: 2019-02-01
Attending: FAMILY MEDICINE
Payer: COMMERCIAL

## 2019-02-01 VITALS
SYSTOLIC BLOOD PRESSURE: 138 MMHG | HEIGHT: 71 IN | DIASTOLIC BLOOD PRESSURE: 78 MMHG | TEMPERATURE: 98.3 F | OXYGEN SATURATION: 99 % | HEART RATE: 82 BPM | BODY MASS INDEX: 30.38 KG/M2 | WEIGHT: 217 LBS | RESPIRATION RATE: 18 BRPM

## 2019-02-01 DIAGNOSIS — J20.9 ACUTE BRONCHITIS, UNSPECIFIED ORGANISM: Primary | ICD-10-CM

## 2019-02-01 DIAGNOSIS — R04.0 EPISTAXIS: ICD-10-CM

## 2019-02-01 PROCEDURE — 99213 OFFICE O/P EST LOW 20 MIN: CPT | Performed by: FAMILY MEDICINE

## 2019-02-01 RX ORDER — AZITHROMYCIN 250 MG/1
TABLET, FILM COATED ORAL
Qty: 6 TABLET | Refills: 0 | Status: SHIPPED | OUTPATIENT
Start: 2019-02-01 | End: 2019-02-06

## 2019-02-01 RX ORDER — BENZONATATE 100 MG/1
100 CAPSULE ORAL 3 TIMES DAILY PRN
Qty: 30 CAPSULE | Refills: 1 | Status: SHIPPED | OUTPATIENT
Start: 2019-02-01 | End: 2020-07-06

## 2019-02-01 RX ORDER — ECHINACEA PURPUREA EXTRACT 125 MG
2 TABLET ORAL 2 TIMES DAILY PRN
Qty: 15 ML | Refills: 0 | Status: SHIPPED | OUTPATIENT
Start: 2019-02-01

## 2019-02-01 ASSESSMENT — MIFFLIN-ST. JEOR: SCORE: 1851.44

## 2019-02-01 ASSESSMENT — PAIN SCALES - GENERAL: PAINLEVEL: MILD PAIN (2)

## 2019-02-01 NOTE — NURSING NOTE
"Chief Complaint   Patient presents with     URI       Initial /78 (BP Location: Left arm, Patient Position: Sitting, Cuff Size: Adult Regular)   Pulse 82   Temp 98.3  F (36.8  C) (Tympanic)   Resp 18   Ht 1.803 m (5' 11\")   Wt 98.4 kg (217 lb)   SpO2 99%   BMI 30.27 kg/m   Estimated body mass index is 30.27 kg/m  as calculated from the following:    Height as of this encounter: 1.803 m (5' 11\").    Weight as of this encounter: 98.4 kg (217 lb).  Medication Reconciliation: complete    Yen Whitten MA  "

## 2019-02-01 NOTE — PROGRESS NOTES
SUBJECTIVE:                                                    Mikey Jenkins is a 53 year old male who presents to clinic today for the following health issues:      RESPIRATORY SYMPTOMS      Duration: 7 days    Description  nasal congestion, sore throat, facial pain/pressure, cough, fatigue/malaise, hoarse voice and stomach ache; also noting bloody noses    Severity: moderate    Accompanying signs and symptoms: None    History (predisposing factors):  none    Precipitating or alleviating factors: None    Therapies tried and outcome:  rest and fluids, oral decongestant, no relief        Problem list and histories reviewed & adjusted, as indicated.  Additional history: as documented    Patient Active Problem List   Diagnosis     Hearing loss     Gout, chronic     ACP (advance care planning)     Hyperlipidemia, unspecified hyperlipidemia type     Erectile dysfunction, unspecified erectile dysfunction type     Male hypogonadism     Past Surgical History:   Procedure Laterality Date     ankle open reduction internal fixation      RT     C ORAL SURGERY PROCEDURE Left     wisdom tooth scaped      circumcision       HERNIA REPAIR      umbilical     inguinal hernia repair  1987     ORTHOPEDIC SURGERY      right ankle     vasectomy         Social History     Tobacco Use     Smoking status: Never Smoker     Smokeless tobacco: Never Used   Substance Use Topics     Alcohol use: Yes     Comment: 4 beers, weekly     Family History   Problem Relation Age of Onset     C.A.D. Father      Dementia Father      Arthritis Brother         gout         Current Outpatient Medications   Medication Sig Dispense Refill     allopurinol (ZYLOPRIM) 300 MG tablet TAKE 1 TABLET(300 MG) BY MOUTH DAILY 90 tablet 1     atorvastatin (LIPITOR) 40 MG tablet Take 1 tablet (40 mg) by mouth daily 90 tablet 3     colchicine (COLCRYS) 0.6 MG tablet Take 2 tablets at the first sign of flare, take 1 additional tablet one hour later. 9 tablet 0     ibuprofen  "(ADVIL/MOTRIN) 200 MG tablet Take 4 tablets (800 mg) by mouth 3 times daily as needed for mild pain (RX given by Dr Chow at appt #90 with 1 refill) With food 100 tablet 0     indomethacin (INDOCIN) 25 MG capsule Take 1-2 capsules (25-50 mg) by mouth 3 times daily as needed (gout) 30 capsule 2     sildenafil (REVATIO) 20 MG tablet Take 3-5 (60-100mg) tablets by mouth 1 hour prior to sexual activity 100 tablet 11     sildenafil (VIAGRA) 100 MG tablet Take 1 tablet 1 hour prior to sexual activity.  Do not take more than 1 tablet in 24 hours. 150 tablet 1     No Known Allergies    ROS:  Constitutional, HEENT, cardiovascular, pulmonary, gi and gu systems are negative, except as otherwise noted.    OBJECTIVE:     /78 (BP Location: Left arm, Patient Position: Sitting, Cuff Size: Adult Regular)   Pulse 82   Temp 98.3  F (36.8  C) (Tympanic)   Resp 18   Ht 1.803 m (5' 11\")   Wt 98.4 kg (217 lb)   SpO2 99%   BMI 30.27 kg/m    Body mass index is 30.27 kg/m .  GENERAL: alert and no distress  EYES: Eyes grossly normal to inspection, PERRL and conjunctivae and sclerae normal  HENT: normal cephalic/atraumatic, ear canals and TM's normal, nasal mucosa edematous and irritated with evidence of previous bleeding, oropharynx clear, oral mucous membranes moist   NECK: no adenopathy  RESP: no rales  and rhonchi throughout  CV: regular rates and rhythm, normal S1 S2, no S3 or S4 and no murmur, click or rub  PSYCH: mentation appears normal, affect normal/bright    Diagnostic Test Results:  none     ASSESSMENT/PLAN:     1. Acute bronchitis, unspecified organism  Azithromycin prescribed.  Tessalon perles given for cough.  Symptomatic cares discussed.  Follow-up as needed.  - azithromycin (ZITHROMAX) 250 MG tablet; Take 2 tablets (500 mg) by mouth daily for 1 day, THEN 1 tablet (250 mg) daily for 4 days.  Dispense: 6 tablet; Refill: 0  - benzonatate (TESSALON PERLES) 100 MG capsule; Take 1 capsule (100 mg) by mouth 3 times daily " as needed for cough  Dispense: 30 capsule; Refill: 1    2. Epistaxis  Saline spray followed by ointment.  Follow-up if no improvement noted.  - sodium chloride (OCEAN) 0.65 % nasal spray; Spray 2 sprays in nostril 2 times daily as needed (nose bleeds)  Dispense: 15 mL; Refill: 0  - mupirocin (BACTROBAN) 2 % nasal ointment; Apply a thin layer to nostrils after using nasal spray twice daily  Dispense: 1 g; Refill: 0        Anai Le MD  [facility]

## 2019-03-25 ENCOUNTER — OFFICE VISIT (OUTPATIENT)
Dept: UROLOGY | Facility: OTHER | Age: 54
End: 2019-03-25
Attending: UROLOGY
Payer: COMMERCIAL

## 2019-03-25 VITALS
RESPIRATION RATE: 14 BRPM | SYSTOLIC BLOOD PRESSURE: 136 MMHG | BODY MASS INDEX: 29.43 KG/M2 | DIASTOLIC BLOOD PRESSURE: 80 MMHG | WEIGHT: 211 LBS | HEART RATE: 64 BPM

## 2019-03-25 DIAGNOSIS — E29.1 MALE HYPOGONADISM: Primary | ICD-10-CM

## 2019-03-25 PROCEDURE — 96372 THER/PROPH/DIAG INJ SC/IM: CPT

## 2019-03-25 PROCEDURE — 99213 OFFICE O/P EST LOW 20 MIN: CPT | Performed by: UROLOGY

## 2019-03-25 PROCEDURE — 25000128 H RX IP 250 OP 636: Performed by: UROLOGY

## 2019-03-25 RX ADMIN — TESTOSTERONE UNDECANOATE 750 MG: 250 INJECTION INTRAMUSCULAR at 13:53

## 2019-03-25 ASSESSMENT — PAIN SCALES - GENERAL: PAINLEVEL: NO PAIN (0)

## 2019-03-25 NOTE — NURSING NOTE
Pt presents to clinic for his Aveed injection  Review of Systems:    Weight loss:    No     Recent fever/chills:  No   Night sweats:   No  Current skin rash:  No   Recent hair loss:  No  Heat intolerance:  No   Cold intolerance:  No  Chest pain:   No   Palpitations:   No  Shortness of breath:  No   Wheezing:   No  Constipation:    No   Diarrhea:   No   Nausea:   No   Vomiting:   No   Kidney/side pain:  No   Back pain:   No  Frequent headaches:  No   Dizziness:     No  Leg swelling:   No   Calf pain:    No

## 2019-03-25 NOTE — PROGRESS NOTES
Type of Visit  EST    Chief Complaint  Hypogonadism  ED    HPI  Mr. Jenkins is a 53 year old male who follows up with concerns regarding hypogonadism and ED.  His primary symptoms are low libido and energy.  The symptoms started 3 years ago.  He initially started short acting injections but the rapid cycling of levels proved to be difficult.  He reports significant improved libido since starting AVEED.  He does describe excellent symptom management for approximately 8-8-1/2 weeks.  Because of this at the last visit we adjusted his cycle to every 9 weeks.  All symptoms have been stable since the last visit.  He has not undergone labs with the new cycle.  He is due for labs prior to the next treatment.    Also with ED.  Has tried sildenafil with good benefit.  He takes 50mg per dose with acceptable results for the last year.  No side effects  This continues to be beneficial and he has no issues.      Review of Systems  I personally reviewed the ROS with the patient.    Nursing Notes:   Saniya Sutton LPN  3/25/2019  1:54 PM  Signed  Pt presents to clinic for his Aveed injection  Review of Systems:    Weight loss:    No     Recent fever/chills:  No   Night sweats:   No  Current skin rash:  No   Recent hair loss:  No  Heat intolerance:  No   Cold intolerance:  No  Chest pain:   No   Palpitations:   No  Shortness of breath:  No   Wheezing:   No  Constipation:    No   Diarrhea:   No   Nausea:   No   Vomiting:   No   Kidney/side pain:  No   Back pain:   No  Frequent headaches:  No   Dizziness:     No  Leg swelling:   No   Calf pain:    No           Physical Exam  Vitals:    03/25/19 1343   BP: 136/80   BP Location: Left arm   Patient Position: Sitting   Cuff Size: Adult Regular   Pulse: 64   Resp: 14   Weight: 95.7 kg (211 lb)   Constitutional: No acute distress.  Alert and cooperative   Head: NCAT  Eyes: Conjunctivae normal  Cardiovascular: Regular rate.  Pulmonary/Chest: Respirations are even and non-labored  bilaterally, no audible wheezing  Abdominal: Soft. No distension, tenderness, masses or guarding.   Extremities: JO-ANN x 4, Warm. No clubbing.  No cyanosis.    Skin: Pink, warm and dry.  No visible rashes noted.  Back:  No left CVA tenderness.  No right CVA tenderness.  Genitourinary:  Nonpalpable bladder    Labs  Results for LIZABETH ALEXANDER (MRN [id number]) as of 11/19/2018 11:41   11/6/2018 14:37   Testosterone Total 287   Hematocrit 50.5     Results for LIZABETH ALEXANDER (MRN [id number]) as of 11/19/2018 11:41   3/7/2018 07:54   PSA 1.55   Testosterone Total 871     Results for LIZABETH ALEXANDER (MRN [id number]) as of 4/11/2018 11:15   11/7/2016 09:05 6/22/2017 09:41 9/22/2017 09:56 3/7/2018 07:54   Sex Hormone Binding Globulin 21 23     Testosterone Total 290 798 736 871   Free Testosterone Calculated 7.20 21.84       Results for LIZABETH ALEXANDER (MRN [id number]) as of 4/11/2018 11:15   3/7/2018 07:54   PSA 1.55     Testosterone Injection  Today the patient received an injection of testosterone undecanoate 750mg.  This was given in the gluteus.  The results of this injection: None  Patient was monitored for 30 minutes following the injection.    Assessment  Mr. Alexander is a 53 year old male with clinically symptomatic hypogonadism currently receiving Aveed and ED.  He is due for labs with the new cycle that he is currently on of 9 weeks between injections    Plan  Continue sildenafil 80mg  Labs in 7 weeks (HCT, T)  Aveed 750mg IM every 9 weeks.   No cyanosis, clubbing or edema

## 2019-05-28 DIAGNOSIS — E29.1 HYPOGONADISM IN MALE: Primary | ICD-10-CM

## 2019-05-29 ENCOUNTER — MEDICAL CORRESPONDENCE (OUTPATIENT)
Dept: HEALTH INFORMATION MANAGEMENT | Facility: CLINIC | Age: 54
End: 2019-05-29

## 2019-07-03 DIAGNOSIS — M10.00 IDIOPATHIC GOUT, UNSPECIFIED CHRONICITY, UNSPECIFIED SITE: ICD-10-CM

## 2019-07-05 RX ORDER — ALLOPURINOL 300 MG/1
TABLET ORAL
Qty: 90 TABLET | Refills: 0 | Status: SHIPPED | OUTPATIENT
Start: 2019-07-05 | End: 2020-09-17

## 2019-07-05 NOTE — TELEPHONE ENCOUNTER
allopurinol (ZYLOPRIM) 300 MG tablet      Last Written Prescription Date:  1/2/19  Last Fill Quantity: 90,   # refills: 1  Last Office Visit: 2/1/19  Future Office visit:       Routing refill request to provider for review/approval because:   Normal serum creatinine on file in the past 12 months     No results found for: CR

## 2019-08-13 DIAGNOSIS — E29.1 HYPOGONADISM IN MALE: Primary | ICD-10-CM

## 2019-08-15 ENCOUNTER — OFFICE VISIT (OUTPATIENT)
Dept: UROLOGY | Facility: OTHER | Age: 54
End: 2019-08-15
Attending: UROLOGY
Payer: COMMERCIAL

## 2019-08-15 VITALS
DIASTOLIC BLOOD PRESSURE: 80 MMHG | BODY MASS INDEX: 29.93 KG/M2 | WEIGHT: 214.6 LBS | HEART RATE: 64 BPM | RESPIRATION RATE: 14 BRPM | SYSTOLIC BLOOD PRESSURE: 130 MMHG

## 2019-08-15 DIAGNOSIS — E29.1 MALE HYPOGONADISM: ICD-10-CM

## 2019-08-15 DIAGNOSIS — E29.1 HYPOGONADISM IN MALE: Primary | ICD-10-CM

## 2019-08-15 LAB
HCT VFR BLD AUTO: 48.4 % (ref 40–53)
TESTOST SERPL-MCNC: 258 NG/DL (ref 175–781)

## 2019-08-15 PROCEDURE — 84403 ASSAY OF TOTAL TESTOSTERONE: CPT | Performed by: UROLOGY

## 2019-08-15 PROCEDURE — 36415 COLL VENOUS BLD VENIPUNCTURE: CPT | Performed by: UROLOGY

## 2019-08-15 PROCEDURE — 99213 OFFICE O/P EST LOW 20 MIN: CPT | Performed by: UROLOGY

## 2019-08-15 PROCEDURE — 85014 HEMATOCRIT: CPT | Performed by: UROLOGY

## 2019-08-15 PROCEDURE — 96372 THER/PROPH/DIAG INJ SC/IM: CPT

## 2019-08-15 PROCEDURE — 25000128 H RX IP 250 OP 636: Performed by: UROLOGY

## 2019-08-15 RX ADMIN — TESTOSTERONE UNDECANOATE 750 MG: 250 INJECTION INTRAMUSCULAR at 13:37

## 2019-08-15 ASSESSMENT — PAIN SCALES - GENERAL: PAINLEVEL: NO PAIN (0)

## 2019-08-15 NOTE — NURSING NOTE
"Pt presents to clinic for Aveed injection    Review of Systems:    Weight loss:    No     Recent fever/chills:  No   Night sweats:   No  Current skin rash:  No   Recent hair loss:  No  Heat intolerance:  No   Cold intolerance:  No  Chest pain:   No   Palpitations:   No  Shortness of breath:  No   Wheezing:   No  Constipation:    No   Diarrhea:   No   Nausea:   No   Vomiting:   No   Kidney/side pain:  No   Back pain:   No  Frequent headaches:  No   Dizziness:     No  Leg swelling:   No   Calf pain:    No  Patient given \"Aveed Treatment: A Patient Guide\" form.  Injection given and patient in clinic for 30 minutes after injection for monitoring.  Patient tolerated injection with no problems.  Saniya Sutton LPN .........8/15/2019...1:29 PM      "

## 2019-08-15 NOTE — PROGRESS NOTES
"Type of Visit  EST    Chief Complaint  Hypogonadism  ED    HPI  Mr. Jenkins is a 53 year old male who follows up with hypogonadism and ED.  His primary symptoms are low libido and energy.  The symptoms started 3 years ago.  He initially started short acting injections but the rapid cycling of levels proved to be difficult.  He reported significant improved libido since starting AVEED.  We transition to 9-week intervals based on symptoms and lab values.  He was lost to follow-up after the last injection due to some social issues.  He is overdue for his treatment.  He underwent labs earlier today.    Also with ED.  Has tried sildenafil with good benefit.  He takes 50mg per dose with acceptable results for the last year.  Continues to deny side effects.  T needs to be satisfied with the benefit from sildenafil.      Review of Systems  I personally reviewed the ROS with the patient.    Nursing Notes:   Saniya Sutton LPN  8/15/2019  1:45 PM  Signed  Pt presents to clinic for Aveed injection    Review of Systems:    Weight loss:    No     Recent fever/chills:  No   Night sweats:   No  Current skin rash:  No   Recent hair loss:  No  Heat intolerance:  No   Cold intolerance:  No  Chest pain:   No   Palpitations:   No  Shortness of breath:  No   Wheezing:   No  Constipation:    No   Diarrhea:   No   Nausea:   No   Vomiting:   No   Kidney/side pain:  No   Back pain:   No  Frequent headaches:  No   Dizziness:     No  Leg swelling:   No   Calf pain:    No  Patient given \"Aveed Treatment: A Patient Guide\" form.  Injection given and patient in clinic for 30 minutes after injection for monitoring.  Patient tolerated injection with no problems.  Saniya Suttno LPN .........8/15/2019...1:29 PM         Physical Exam  Vitals:    08/15/19 1330   BP: 130/80   BP Location: Left arm   Patient Position: Sitting   Cuff Size: Adult Regular   Pulse: 64   Resp: 14   Weight: 97.3 kg (214 lb 9.6 oz)   Constitutional: No acute distress.  " Alert and cooperative   Head: NCAT  Eyes: Conjunctivae normal  Cardiovascular: Regular rate.  Pulmonary/Chest: Respirations are even and non-labored bilaterally, no audible wheezing  Abdominal: Soft. No distension, tenderness, masses or guarding.   Extremities: JO-ANN x 4, Warm. No clubbing.  No cyanosis.    Skin: Pink, warm and dry.  No visible rashes noted.  Back:  No left CVA tenderness.  No right CVA tenderness.  Genitourinary:  Nonpalpable bladder    Labs  Results for LIZABETH ALEXANDER (MRN [id number]) as of 8/15/2019 13:45   11/6/2018 14:37 8/15/2019 12:45   Testosterone Total 287 258   Hematocrit 50.5 48.4     Results for LIZABETH ALEXANDER (MRN [id number]) as of 11/19/2018 11:41   3/7/2018 07:54   PSA 1.55   Testosterone Total 871     Results for LIZABETH ALEXANDER (MRN [id number]) as of 4/11/2018 11:15   11/7/2016 09:05 6/22/2017 09:41 9/22/2017 09:56 3/7/2018 07:54   Sex Hormone Binding Globulin 21 23     Testosterone Total 290 798 736 871   Free Testosterone Calculated 7.20 21.84       Results for LIZABETH ALEXANDER (MRN [id number]) as of 4/11/2018 11:15   3/7/2018 07:54   PSA 1.55     Testosterone Injection  Today the patient received an injection of testosterone undecanoate 750mg.  This was given in the gluteus.  The results of this injection: None  Patient was monitored for 30 minutes following the injection.    Assessment  Mr. Alexander is a 53 year old male with clinically symptomatic hypogonadism currently receiving Aveed and ED.  Reviewed recent labs  Labs every 3 cycles (HCT, T)    Plan  Continue sildenafil 80mg  Aveed 750mg IM every 9 weeks.

## 2019-08-16 ENCOUNTER — TELEPHONE (OUTPATIENT)
Dept: FAMILY MEDICINE | Facility: OTHER | Age: 54
End: 2019-08-16

## 2019-08-16 NOTE — TELEPHONE ENCOUNTER
Had called patient to schedule 9 week Aveed and he inquired of the lab results that were communicated to him at 8-15-19 visit. Please call patient to confirm results. Thanks.

## 2019-08-19 ENCOUNTER — TELEPHONE (OUTPATIENT)
Dept: UROLOGY | Facility: OTHER | Age: 54
End: 2019-08-19

## 2019-08-19 NOTE — TELEPHONE ENCOUNTER
Patient notified that his last testosterone level was 258.  Sybil Monae RN......August 19, 2019...9:52 AM

## 2019-08-29 ENCOUNTER — MEDICAL CORRESPONDENCE (OUTPATIENT)
Dept: HEALTH INFORMATION MANAGEMENT | Facility: CLINIC | Age: 54
End: 2019-08-29

## 2019-10-03 DIAGNOSIS — M10.00 IDIOPATHIC GOUT, UNSPECIFIED CHRONICITY, UNSPECIFIED SITE: ICD-10-CM

## 2019-10-03 DIAGNOSIS — E78.5 HYPERLIPIDEMIA, UNSPECIFIED HYPERLIPIDEMIA TYPE: ICD-10-CM

## 2019-10-06 NOTE — PROGRESS NOTES
Prior to injection verified patient identity using patient's name and date of birth.    Per orders of Geoffrey Rey, injection of Testosterone given by Sarah Castellon. Patient instructed to remain in clinic for 20 minutes afterwards, and to report any adverse reaction to me immediately.    The following medication was given:     MEDICATION: Depo Testosterone  200 mg  ROUTE: IM  SITE: LUQ - Gluteus  DOSE: 200 mg  LOT #: [id number]  :  [employer]  EXPIRATION DATE:  12/2019  NDC#: 9312-7178-80  Sarah Castellon LPN   Universal Safety Interventions

## 2019-10-07 NOTE — TELEPHONE ENCOUNTER
Allopurinol  Last office visit: 02/01/19  Last refill: 07/05/19 #90  Protocol failed due to labs. Please advise.    Thank you.    Atrovastatin  Last office visit: 02/01/19  Last refill: 08/20/18 #90 with 3 refills    Patient due for annual office visit and labs. Please advise.

## 2019-10-08 RX ORDER — ATORVASTATIN CALCIUM 40 MG/1
TABLET, FILM COATED ORAL
Qty: 90 TABLET | Refills: 0 | Status: SHIPPED | OUTPATIENT
Start: 2019-10-08 | End: 2020-03-19

## 2019-10-08 RX ORDER — ALLOPURINOL 300 MG/1
TABLET ORAL
Qty: 90 TABLET | Refills: 0 | Status: SHIPPED | OUTPATIENT
Start: 2019-10-08 | End: 2020-03-19

## 2019-10-21 DIAGNOSIS — E29.1 HYPOGONADISM IN MALE: Primary | ICD-10-CM

## 2020-03-17 DIAGNOSIS — M10.00 IDIOPATHIC GOUT, UNSPECIFIED CHRONICITY, UNSPECIFIED SITE: ICD-10-CM

## 2020-03-17 DIAGNOSIS — E78.5 HYPERLIPIDEMIA, UNSPECIFIED HYPERLIPIDEMIA TYPE: ICD-10-CM

## 2020-03-19 RX ORDER — ATORVASTATIN CALCIUM 40 MG/1
TABLET, FILM COATED ORAL
Qty: 90 TABLET | Refills: 0 | Status: SHIPPED | OUTPATIENT
Start: 2020-03-19 | End: 2020-06-16

## 2020-03-19 RX ORDER — ALLOPURINOL 300 MG/1
TABLET ORAL
Qty: 90 TABLET | Refills: 0 | Status: SHIPPED | OUTPATIENT
Start: 2020-03-19 | End: 2020-06-16

## 2020-06-14 DIAGNOSIS — E78.5 HYPERLIPIDEMIA, UNSPECIFIED HYPERLIPIDEMIA TYPE: ICD-10-CM

## 2020-06-14 DIAGNOSIS — M10.00 IDIOPATHIC GOUT, UNSPECIFIED CHRONICITY, UNSPECIFIED SITE: ICD-10-CM

## 2020-06-16 RX ORDER — ALLOPURINOL 300 MG/1
TABLET ORAL
Qty: 90 TABLET | Refills: 0 | Status: SHIPPED | OUTPATIENT
Start: 2020-06-16 | End: 2020-07-06

## 2020-06-16 RX ORDER — ATORVASTATIN CALCIUM 40 MG/1
TABLET, FILM COATED ORAL
Qty: 90 TABLET | Refills: 0 | Status: SHIPPED | OUTPATIENT
Start: 2020-06-16 | End: 2020-09-17

## 2020-06-16 NOTE — TELEPHONE ENCOUNTER
Allopurinol 300mg      Last Written Prescription Date:  3/19/20  Last Fill Quantity: 90,   # refills: 0  Last Office Visit: 2/1/1    Atorvastatin 40mg      Last Written Prescription Date:  3/19/20  Last Fill Quantity: 90,   # refills: 0  Last Office Visit: 2/1/19  Future Office visit:       Routing refill request to provider for review/approval because:   CBC on file in past 12 months Protocol Details    ALT on file in past 12 months     Has Uric Acid on file in past 12 months and value is less than 6     Recent (12 mo) or future (30 days) visit within the authorizing provider's specialty     Normal serum creatinine on file in the past 12 months      Lab Results   Component Value Date    ALT 41 08/20/2018     Uric Acid   Date Value Ref Range Status   08/20/2018 7.7 (H) 3.5 - 7.2 mg/dL Final     No results found for: CR  LDL Cholesterol Calculated   Date Value Ref Range Status   08/20/2018  <100 mg/dL Final    Cannot estimate LDL when triglyceride exceeds 400 mg/dL     Lab Results   Component Value Date    WBC 5.7 08/20/2018     Lab Results   Component Value Date    RBC 5.36 08/20/2018     Lab Results   Component Value Date    HGB 16.8 08/20/2018     Lab Results   Component Value Date    HCT 48.4 08/15/2019     No components found for: MCT  Lab Results   Component Value Date    MCV 89 08/20/2018     Lab Results   Component Value Date    MCH 31.3 08/20/2018     Lab Results   Component Value Date    MCHC 35.1 08/20/2018     Lab Results   Component Value Date    RDW 13.6 08/20/2018     Lab Results   Component Value Date     08/20/2018

## 2020-06-16 NOTE — TELEPHONE ENCOUNTER
Please schedule patient for office visit, then please route task back to me to approve refill once appointment scheduled.

## 2020-07-06 ENCOUNTER — VIRTUAL VISIT (OUTPATIENT)
Dept: FAMILY MEDICINE | Facility: OTHER | Age: 55
End: 2020-07-06
Attending: FAMILY MEDICINE
Payer: COMMERCIAL

## 2020-07-06 DIAGNOSIS — E78.5 HYPERLIPIDEMIA, UNSPECIFIED HYPERLIPIDEMIA TYPE: Primary | ICD-10-CM

## 2020-07-06 DIAGNOSIS — M1A.09X0 CHRONIC GOUT OF MULTIPLE SITES, UNSPECIFIED CAUSE: ICD-10-CM

## 2020-07-06 DIAGNOSIS — G89.4 CHRONIC PAIN SYNDROME: ICD-10-CM

## 2020-07-06 PROCEDURE — 99213 OFFICE O/P EST LOW 20 MIN: CPT | Mod: 95 | Performed by: FAMILY MEDICINE

## 2020-07-06 RX ORDER — COLCHICINE 0.6 MG/1
TABLET ORAL
Qty: 9 TABLET | Refills: 0 | Status: SHIPPED | OUTPATIENT
Start: 2020-07-06

## 2020-07-06 RX ORDER — INDOMETHACIN 25 MG/1
25-50 CAPSULE ORAL 3 TIMES DAILY PRN
Qty: 30 CAPSULE | Refills: 2 | Status: SHIPPED | OUTPATIENT
Start: 2020-07-06 | End: 2021-10-05

## 2020-07-06 RX ORDER — IBUPROFEN 800 MG/1
800 TABLET, FILM COATED ORAL 3 TIMES DAILY PRN
Qty: 100 TABLET | Refills: 1 | Status: SHIPPED | OUTPATIENT
Start: 2020-07-06 | End: 2024-05-02

## 2020-07-06 ASSESSMENT — ANXIETY QUESTIONNAIRES
3. WORRYING TOO MUCH ABOUT DIFFERENT THINGS: NOT AT ALL
5. BEING SO RESTLESS THAT IT IS HARD TO SIT STILL: NOT AT ALL
4. TROUBLE RELAXING: NOT AT ALL
1. FEELING NERVOUS, ANXIOUS, OR ON EDGE: NOT AT ALL
6. BECOMING EASILY ANNOYED OR IRRITABLE: NOT AT ALL
IF YOU CHECKED OFF ANY PROBLEMS ON THIS QUESTIONNAIRE, HOW DIFFICULT HAVE THESE PROBLEMS MADE IT FOR YOU TO DO YOUR WORK, TAKE CARE OF THINGS AT HOME, OR GET ALONG WITH OTHER PEOPLE: NOT DIFFICULT AT ALL
GAD7 TOTAL SCORE: 0
2. NOT BEING ABLE TO STOP OR CONTROL WORRYING: NOT AT ALL
7. FEELING AFRAID AS IF SOMETHING AWFUL MIGHT HAPPEN: NOT AT ALL

## 2020-07-06 ASSESSMENT — PATIENT HEALTH QUESTIONNAIRE - PHQ9: SUM OF ALL RESPONSES TO PHQ QUESTIONS 1-9: 3

## 2020-07-06 NOTE — PROGRESS NOTES
"Mikey Jenkins is a 54 year old male who is being evaluated via a billable telephone visit.      The patient has been notified of following:     \"This telephone visit will be conducted via a call between you and your physician/provider. We have found that certain health care needs can be provided without the need for a physical exam.  This service lets us provide the care you need with a short phone conversation.  If a prescription is necessary we can send it directly to your pharmacy.  If lab work is needed we can place an order for that and you can then stop by our lab to have the test done at a later time.    Telephone visits are billed at different rates depending on your insurance coverage. During this emergency period, for some insurers they may be billed the same as an in-person visit.  Please reach out to your insurance provider with any questions.    If during the course of the call the physician/provider feels a telephone visit is not appropriate, you will not be charged for this service.\"    Patient has given verbal consent for Telephone visit?  Yes    What phone number would you like to be contacted at? [phone]    How would you like to obtain your AVS? Mail a copy    Subjective     Mikey Jenkins is a 54 year old male who presents via phone visit today for the following health issues:    HPI  Hyperlipidemia Follow-Up      Are you regularly taking any medication or supplement to lower your cholesterol?   Yes- lipitor    Are you having muscle aches or other side effects that you think could be caused by your cholesterol lowering medication?  No      How many servings of fruits and vegetables do you eat daily?  0-1    On average, how many sweetened beverages do you drink each day (Examples: soda, juice, sweet tea, etc.  Do NOT count diet or artificially sweetened beverages)?   4    How many days per week do you exercise enough to make your heart beat faster? 7    How many minutes a day do you exercise " enough to make your heart beat faster? 30 - 60    How many days per week do you miss taking your medication? 1        Patient states his gout has been rather well controlled.  He would like refills of prn medications in case of flares.      Patient Active Problem List   Diagnosis     Hearing loss     Gout, chronic     ACP (advance care planning)     Hyperlipidemia, unspecified hyperlipidemia type     Erectile dysfunction, unspecified erectile dysfunction type     Male hypogonadism     Past Surgical History:   Procedure Laterality Date     ankle open reduction internal fixation      RT     C ORAL SURGERY PROCEDURE Left     wisdom tooth scaped      circumcision       HERNIA REPAIR      umbilical     inguinal hernia repair  1987     ORTHOPEDIC SURGERY      right ankle     vasectomy         Social History     Tobacco Use     Smoking status: Never Smoker     Smokeless tobacco: Never Used   Substance Use Topics     Alcohol use: Yes     Comment: 4 beers, weekly     Family History   Problem Relation Age of Onset     C.A.D. Father      Dementia Father      Arthritis Brother         gout         Current Outpatient Medications   Medication Sig Dispense Refill     allopurinol (ZYLOPRIM) 300 MG tablet TAKE 1 TABLET(300 MG) BY MOUTH DAILY 90 tablet 0     atorvastatin (LIPITOR) 40 MG tablet TAKE 1 TABLET BY MOUTH EVERY DAY 90 tablet 0     colchicine (COLCRYS) 0.6 MG tablet Take 2 tablets at the first sign of flare, take 1 additional tablet one hour later. 9 tablet 0     ibuprofen (ADVIL/MOTRIN) 800 MG tablet Take 1 tablet (800 mg) by mouth 3 times daily as needed for mild pain 100 tablet 1     indomethacin (INDOCIN) 25 MG capsule Take 1-2 capsules (25-50 mg) by mouth 3 times daily as needed (gout) 30 capsule 2     sildenafil (REVATIO) 20 MG tablet Take 3-5 (60-100mg) tablets by mouth 1 hour prior to sexual activity 100 tablet 11     sodium chloride (OCEAN) 0.65 % nasal spray Spray 2 sprays in nostril 2 times daily as needed (nose  bleeds) 15 mL 0     No Known Allergies    Reviewed and updated as needed this visit by Provider  Tobacco  Allergies  Meds  Problems  Med Hx  Surg Hx  Fam Hx         Review of Systems   Constitutional, HEENT, cardiovascular, pulmonary, gi and gu systems are negative, except as otherwise noted.       Objective   Reported vitals:  There were no vitals taken for this visit.   PSYCH: Alert and oriented times 3; coherent speech, normal   rate and volume, able to articulate logical thoughts, able   to abstract reason, no tangential thoughts, no hallucinations   or delusions  His affect is normal and pleasant  RESP: No cough, no audible wheezing, able to talk in full sentences  Remainder of exam unable to be completed due to telephone visits    Diagnostic Test Results:  See orders        Assessment/Plan:  1. Hyperlipidemia, unspecified hyperlipidemia type  Future lab orders entered, will refill medication when due again.  Follow-up on annual basis.  - Lipid Profile (Chol, Trig, HDL, LDL calc); Future  - ALT; Future    2. Chronic gout of multiple sites, unspecified cause  As above  - CBC with platelets; Future  - Uric acid; Future  - Basic metabolic panel; Future  - indomethacin (INDOCIN) 25 MG capsule; Take 1-2 capsules (25-50 mg) by mouth 3 times daily as needed (gout)  Dispense: 30 capsule; Refill: 2  - colchicine (COLCRYS) 0.6 MG tablet; Take 2 tablets at the first sign of flare, take 1 additional tablet one hour later.  Dispense: 9 tablet; Refill: 0    3. Chronic pain syndrome  Refilled.  - ibuprofen (ADVIL/MOTRIN) 800 MG tablet; Take 1 tablet (800 mg) by mouth 3 times daily as needed for mild pain  Dispense: 100 tablet; Refill: 1        Return in about 1 year (around 7/6/2021) for Medication review.      Phone call duration:  8 minutes    Anai Le MD

## 2020-07-07 ASSESSMENT — ANXIETY QUESTIONNAIRES: GAD7 TOTAL SCORE: 0

## 2020-07-14 DIAGNOSIS — M1A.09X0 CHRONIC GOUT OF MULTIPLE SITES, UNSPECIFIED CAUSE: ICD-10-CM

## 2020-07-14 DIAGNOSIS — E78.5 HYPERLIPIDEMIA, UNSPECIFIED HYPERLIPIDEMIA TYPE: ICD-10-CM

## 2020-07-14 LAB
ALT SERPL W P-5'-P-CCNC: 36 U/L (ref 0–70)
ANION GAP SERPL CALCULATED.3IONS-SCNC: 6 MMOL/L (ref 3–14)
BUN SERPL-MCNC: 12 MG/DL (ref 7–30)
CALCIUM SERPL-MCNC: 9 MG/DL (ref 8.5–10.1)
CHLORIDE SERPL-SCNC: 109 MMOL/L (ref 94–109)
CHOLEST SERPL-MCNC: 131 MG/DL
CO2 SERPL-SCNC: 27 MMOL/L (ref 20–32)
CREAT SERPL-MCNC: 1.04 MG/DL (ref 0.66–1.25)
ERYTHROCYTE [DISTWIDTH] IN BLOOD BY AUTOMATED COUNT: 13.5 % (ref 10–15)
GFR SERPL CREATININE-BSD FRML MDRD: 81 ML/MIN/{1.73_M2}
GLUCOSE SERPL-MCNC: 112 MG/DL (ref 70–99)
HCT VFR BLD AUTO: 43.4 % (ref 40–53)
HDLC SERPL-MCNC: 33 MG/DL
HGB BLD-MCNC: 15.5 G/DL (ref 13.3–17.7)
LDLC SERPL CALC-MCNC: 45 MG/DL
MCH RBC QN AUTO: 31.1 PG (ref 26.5–33)
MCHC RBC AUTO-ENTMCNC: 35.7 G/DL (ref 31.5–36.5)
MCV RBC AUTO: 87 FL (ref 78–100)
NONHDLC SERPL-MCNC: 98 MG/DL
PLATELET # BLD AUTO: 185 10E9/L (ref 150–450)
POTASSIUM SERPL-SCNC: 4.1 MMOL/L (ref 3.4–5.3)
RBC # BLD AUTO: 4.98 10E12/L (ref 4.4–5.9)
SODIUM SERPL-SCNC: 142 MMOL/L (ref 133–144)
TRIGL SERPL-MCNC: 266 MG/DL
URATE SERPL-MCNC: 6 MG/DL (ref 3.5–7.2)
WBC # BLD AUTO: 4.9 10E9/L (ref 4–11)

## 2020-07-14 PROCEDURE — 85027 COMPLETE CBC AUTOMATED: CPT | Performed by: FAMILY MEDICINE

## 2020-07-14 PROCEDURE — 84460 ALANINE AMINO (ALT) (SGPT): CPT | Performed by: FAMILY MEDICINE

## 2020-07-14 PROCEDURE — 80048 BASIC METABOLIC PNL TOTAL CA: CPT | Performed by: FAMILY MEDICINE

## 2020-07-14 PROCEDURE — 80061 LIPID PANEL: CPT | Performed by: FAMILY MEDICINE

## 2020-07-14 PROCEDURE — 84550 ASSAY OF BLOOD/URIC ACID: CPT | Performed by: FAMILY MEDICINE

## 2020-07-14 PROCEDURE — 36415 COLL VENOUS BLD VENIPUNCTURE: CPT | Performed by: FAMILY MEDICINE

## 2020-09-16 DIAGNOSIS — M10.00 IDIOPATHIC GOUT, UNSPECIFIED CHRONICITY, UNSPECIFIED SITE: ICD-10-CM

## 2020-09-16 DIAGNOSIS — E78.5 HYPERLIPIDEMIA, UNSPECIFIED HYPERLIPIDEMIA TYPE: ICD-10-CM

## 2020-09-17 RX ORDER — ATORVASTATIN CALCIUM 40 MG/1
TABLET, FILM COATED ORAL
Qty: 90 TABLET | Refills: 0 | Status: SHIPPED | OUTPATIENT
Start: 2020-09-17 | End: 2020-12-23

## 2020-09-17 RX ORDER — ALLOPURINOL 300 MG/1
TABLET ORAL
Qty: 90 TABLET | Refills: 0 | Status: SHIPPED | OUTPATIENT
Start: 2020-09-17 | End: 2020-12-23

## 2020-09-17 NOTE — TELEPHONE ENCOUNTER
Lipitor 40 mg      Last Written Prescription Date:  6/16/20  Last Fill Quantity: 90,   # refills: 0  Last Office Visit: 7/6/20  Future Office visit:       Routing refill request to provider for review/approval because:      Allopurinol 300 mg      Last Written Prescription Date:  7/5/19  Last Fill Quantity: 90,   # refills: 0  Last Office Visit: 7/6/20  Future Office visit:       Routing refill request to provider for review/approval because:

## 2020-12-23 DIAGNOSIS — E78.5 HYPERLIPIDEMIA, UNSPECIFIED HYPERLIPIDEMIA TYPE: ICD-10-CM

## 2020-12-23 DIAGNOSIS — M10.00 IDIOPATHIC GOUT, UNSPECIFIED CHRONICITY, UNSPECIFIED SITE: ICD-10-CM

## 2020-12-23 RX ORDER — ATORVASTATIN CALCIUM 40 MG/1
TABLET, FILM COATED ORAL
Qty: 90 TABLET | Refills: 0 | Status: SHIPPED | OUTPATIENT
Start: 2020-12-23 | End: 2021-03-29

## 2020-12-23 RX ORDER — ALLOPURINOL 300 MG/1
TABLET ORAL
Qty: 90 TABLET | Refills: 0 | Status: SHIPPED | OUTPATIENT
Start: 2020-12-23 | End: 2021-03-29

## 2020-12-23 NOTE — TELEPHONE ENCOUNTER
LIPITOR      Last Written Prescription Date:  9-  Last Fill Quantity: 90,   # refills: 0  Last Office Visit: 7-6-2020  Future Office visit:       Routing refill request to provider for review/approval because:          ALLOPURINOL      Last Written Prescription Date:  9-  Last Fill Quantity: 90,   # refills: 0  Last Office Visit: 7-6-2020  Future Office visit:       Routing refill request to provider for review/approval because:

## 2021-03-24 ENCOUNTER — MYC MEDICAL ADVICE (OUTPATIENT)
Dept: FAMILY MEDICINE | Facility: OTHER | Age: 56
End: 2021-03-24

## 2021-03-24 ENCOUNTER — NURSE TRIAGE (OUTPATIENT)
Dept: FAMILY MEDICINE | Facility: OTHER | Age: 56
End: 2021-03-24

## 2021-03-24 NOTE — TELEPHONE ENCOUNTER
Sent text for mychart sign up.  If pt has trouble he will call back.  Pt will do evisit once mychart is set up.

## 2021-03-24 NOTE — TELEPHONE ENCOUNTER
Patient called with symptoms of cough and runny nose for the past week and a half. Patient declined covid test stating he had COVID in December and doesn't believe it is COVID. Patient denies any difficulty breathing. Patient declines office visit and would like a message sent to PCP for possible antibiotic prescription.   PCP to advise.    Additional Information    Negative: Bluish (or gray) lips or face    Negative: Severe difficulty breathing (e.g., struggling for each breath, speaks in single words)    Negative: Rapid onset of cough and has hives    Negative: Coughing started suddenly after medicine, an allergic food or bee sting    Negative: Difficulty breathing after exposure to flames, smoke, or fumes    Negative: Sounds like a life-threatening emergency to the triager    Negative: Previous asthma attacks and this feels like asthma attack    Negative: Chest pain present when not coughing    Negative: Difficulty breathing    Negative: Passed out (i.e., fainted, collapsed and was not responding)    Negative: Patient sounds very sick or weak to the triager    Negative: Coughed up > 1 tablespoon (15 ml) blood (Exception: blood-tinged sputum)    Negative: Fever > 103 F (39.4 C)    Negative: Fever > 101 F (38.3 C) and over 60 years of age    Negative: Fever > 100.0 F (37.8 C) and has diabetes mellitus or a weak immune system (e.g., HIV positive, cancer chemotherapy, organ transplant, splenectomy, chronic steroids)    Negative: Fever > 100.0 F (37.8 C) and bedridden (e.g., nursing home patient, stroke, chronic illness, recovering from surgery)    Negative: Increasing ankle swelling    Negative: Wheezing is present    Negative: SEVERE coughing spells (e.g., whooping sound after coughing, vomiting after coughing)    Negative: Coughing up kip-colored (reddish-brown) or blood-tinged sputum    Negative: Fever present > 3 days (72 hours)    Negative: Fever returns after gone for over 24 hours and symptoms worse or not  "improved    Negative: Using nasal washes and pain medicine > 24 hours and sinus pain persists    Negative: Known COPD or other severe lung disease (i.e., bronchiectasis, cystic fibrosis, lung surgery) and worsening symptoms (i.e., increased sputum purulence or amount, increased breathing difficulty)    Continuous (nonstop) coughing interferes with work or school and no improvement using cough treatment per Care Advice    Answer Assessment - Initial Assessment Questions  1. ONSET: \"When did the cough begin?\"       About one and a half week  2. SEVERITY: \"How bad is the cough today?\"       Worse today than yesterday  3. RESPIRATORY DISTRESS: \"Describe your breathing.\"       no  4. FEVER: \"Do you have a fever?\" If so, ask: \"What is your temperature, how was it measured, and when did it start?\"      no  5. HEMOPTYSIS: \"Are you coughing up any blood?\" If so ask: \"How much?\" (flecks, streaks, tablespoons, etc.)      no  6. TREATMENT: \"What have you done so far to treat the cough?\" (e.g., meds, fluids, humidifier)      vitamins  7. CARDIAC HISTORY: \"Do you have any history of heart disease?\" (e.g., heart attack, congestive heart failure)       no  8. LUNG HISTORY: \"Do you have any history of lung disease?\"  (e.g., pulmonary embolus, asthma, emphysema)      no  9. PE RISK FACTORS: \"Do you have a history of blood clots?\" (or: recent major surgery, recent prolonged travel, bedridden)      no  10. OTHER SYMPTOMS: \"Do you have any other symptoms? (e.g., runny nose, wheezing, chest pain)        Runny nose  11. PREGNANCY: \"Is there any chance you are pregnant?\" \"When was your last menstrual period?\"        n/a  12. TRAVEL: \"Have you traveled out of the country in the last month?\" (e.g., travel history, exposures)        no    Protocols used: COUGH-A-OH      "

## 2021-03-25 NOTE — TELEPHONE ENCOUNTER
Sent the pt the evisit instructions.  When I spoke with him yesterday I told him if he was having any trouble to let us know.

## 2021-03-27 DIAGNOSIS — M10.00 IDIOPATHIC GOUT, UNSPECIFIED CHRONICITY, UNSPECIFIED SITE: ICD-10-CM

## 2021-03-27 DIAGNOSIS — E78.5 HYPERLIPIDEMIA, UNSPECIFIED HYPERLIPIDEMIA TYPE: ICD-10-CM

## 2021-03-28 NOTE — TELEPHONE ENCOUNTER
Zyloprim       Last Written Prescription Date:  12/23/2020  Last Fill Quantity: 90,   # refills: 0    Lipitor       Last Written Prescription Date:  12/23/2020  Last Fill Quantity: 90,   # refills: 0  Last Office Visit: 7/6/2020  Future Office visit:

## 2021-03-29 RX ORDER — ATORVASTATIN CALCIUM 40 MG/1
TABLET, FILM COATED ORAL
Qty: 90 TABLET | Refills: 0 | Status: SHIPPED | OUTPATIENT
Start: 2021-03-29 | End: 2021-06-30

## 2021-03-29 RX ORDER — ALLOPURINOL 300 MG/1
TABLET ORAL
Qty: 90 TABLET | Refills: 0 | Status: SHIPPED | OUTPATIENT
Start: 2021-03-29 | End: 2021-06-30

## 2021-04-18 ENCOUNTER — HEALTH MAINTENANCE LETTER (OUTPATIENT)
Age: 56
End: 2021-04-18

## 2021-06-26 DIAGNOSIS — E78.5 HYPERLIPIDEMIA, UNSPECIFIED HYPERLIPIDEMIA TYPE: ICD-10-CM

## 2021-06-26 DIAGNOSIS — M10.00 IDIOPATHIC GOUT, UNSPECIFIED CHRONICITY, UNSPECIFIED SITE: ICD-10-CM

## 2021-06-29 NOTE — TELEPHONE ENCOUNTER
Last visit was actually 7/6/2020 (virtual).  Please schedule patient for office visit, then please route task back to me to approve refill once appointment scheduled.

## 2021-06-29 NOTE — TELEPHONE ENCOUNTER
lipitor      Last Written Prescription Date:  3/29/21  Last Fill Quantity: 90,   # refills: 0  Last Office Visit: 2/1/19  Future Office visit:       allopurinol      Last Written Prescription Date:  3/29/21  Last Fill Quantity: 90,   # refills: 0

## 2021-06-30 RX ORDER — ATORVASTATIN CALCIUM 40 MG/1
TABLET, FILM COATED ORAL
Qty: 90 TABLET | Refills: 1 | Status: SHIPPED | OUTPATIENT
Start: 2021-06-30 | End: 2021-10-05

## 2021-06-30 RX ORDER — ALLOPURINOL 300 MG/1
TABLET ORAL
Qty: 90 TABLET | Refills: 1 | Status: SHIPPED | OUTPATIENT
Start: 2021-06-30 | End: 2022-06-27

## 2021-10-03 ENCOUNTER — HEALTH MAINTENANCE LETTER (OUTPATIENT)
Age: 56
End: 2021-10-03

## 2021-10-05 ENCOUNTER — OFFICE VISIT (OUTPATIENT)
Dept: FAMILY MEDICINE | Facility: OTHER | Age: 56
End: 2021-10-05
Attending: FAMILY MEDICINE
Payer: COMMERCIAL

## 2021-10-05 VITALS
BODY MASS INDEX: 30.45 KG/M2 | WEIGHT: 217.5 LBS | OXYGEN SATURATION: 96 % | HEIGHT: 71 IN | RESPIRATION RATE: 16 BRPM | TEMPERATURE: 98.1 F | DIASTOLIC BLOOD PRESSURE: 72 MMHG | HEART RATE: 76 BPM | SYSTOLIC BLOOD PRESSURE: 134 MMHG

## 2021-10-05 DIAGNOSIS — Z00.00 ROUTINE GENERAL MEDICAL EXAMINATION AT A HEALTH CARE FACILITY: Primary | ICD-10-CM

## 2021-10-05 DIAGNOSIS — E78.5 HYPERLIPIDEMIA, UNSPECIFIED HYPERLIPIDEMIA TYPE: ICD-10-CM

## 2021-10-05 DIAGNOSIS — H91.93 BILATERAL HEARING LOSS, UNSPECIFIED HEARING LOSS TYPE: ICD-10-CM

## 2021-10-05 DIAGNOSIS — M25.562 CHRONIC PAIN OF LEFT KNEE: ICD-10-CM

## 2021-10-05 DIAGNOSIS — Z12.11 COLON CANCER SCREENING: ICD-10-CM

## 2021-10-05 DIAGNOSIS — M1A.09X0 CHRONIC GOUT OF MULTIPLE SITES, UNSPECIFIED CAUSE: ICD-10-CM

## 2021-10-05 DIAGNOSIS — G31.84 MILD COGNITIVE IMPAIRMENT: ICD-10-CM

## 2021-10-05 DIAGNOSIS — G89.29 CHRONIC PAIN OF LEFT KNEE: ICD-10-CM

## 2021-10-05 DIAGNOSIS — Z12.5 SCREENING FOR MALIGNANT NEOPLASM OF PROSTATE: ICD-10-CM

## 2021-10-05 LAB
ANION GAP SERPL CALCULATED.3IONS-SCNC: 5 MMOL/L (ref 3–14)
BUN SERPL-MCNC: 10 MG/DL (ref 7–30)
CALCIUM SERPL-MCNC: 9 MG/DL (ref 8.5–10.1)
CHLORIDE BLD-SCNC: 107 MMOL/L (ref 94–109)
CHOLEST SERPL-MCNC: 224 MG/DL
CO2 SERPL-SCNC: 29 MMOL/L (ref 20–32)
CREAT SERPL-MCNC: 1.2 MG/DL (ref 0.66–1.25)
FASTING STATUS PATIENT QL REPORTED: YES
GFR SERPL CREATININE-BSD FRML MDRD: 68 ML/MIN/1.73M2
GLUCOSE BLD-MCNC: 114 MG/DL (ref 70–99)
HDLC SERPL-MCNC: 30 MG/DL
LDLC SERPL CALC-MCNC: ABNORMAL MG/DL
NONHDLC SERPL-MCNC: 194 MG/DL
POTASSIUM BLD-SCNC: 4.1 MMOL/L (ref 3.4–5.3)
PSA SERPL-MCNC: 3.3 UG/L (ref 0–4)
SODIUM SERPL-SCNC: 141 MMOL/L (ref 133–144)
TRIGL SERPL-MCNC: 534 MG/DL

## 2021-10-05 PROCEDURE — 36415 COLL VENOUS BLD VENIPUNCTURE: CPT | Performed by: FAMILY MEDICINE

## 2021-10-05 PROCEDURE — 80061 LIPID PANEL: CPT | Performed by: FAMILY MEDICINE

## 2021-10-05 PROCEDURE — 99396 PREV VISIT EST AGE 40-64: CPT | Performed by: FAMILY MEDICINE

## 2021-10-05 PROCEDURE — G0103 PSA SCREENING: HCPCS | Performed by: FAMILY MEDICINE

## 2021-10-05 PROCEDURE — 80048 BASIC METABOLIC PNL TOTAL CA: CPT | Performed by: FAMILY MEDICINE

## 2021-10-05 RX ORDER — INDOMETHACIN 25 MG/1
25-50 CAPSULE ORAL 3 TIMES DAILY PRN
Qty: 30 CAPSULE | Refills: 2 | Status: SHIPPED | OUTPATIENT
Start: 2021-10-05 | End: 2022-01-27

## 2021-10-05 ASSESSMENT — ANXIETY QUESTIONNAIRES
IF YOU CHECKED OFF ANY PROBLEMS ON THIS QUESTIONNAIRE, HOW DIFFICULT HAVE THESE PROBLEMS MADE IT FOR YOU TO DO YOUR WORK, TAKE CARE OF THINGS AT HOME, OR GET ALONG WITH OTHER PEOPLE: SOMEWHAT DIFFICULT
2. NOT BEING ABLE TO STOP OR CONTROL WORRYING: SEVERAL DAYS
4. TROUBLE RELAXING: SEVERAL DAYS
1. FEELING NERVOUS, ANXIOUS, OR ON EDGE: NOT AT ALL
5. BEING SO RESTLESS THAT IT IS HARD TO SIT STILL: NOT AT ALL
GAD7 TOTAL SCORE: 4
7. FEELING AFRAID AS IF SOMETHING AWFUL MIGHT HAPPEN: SEVERAL DAYS
3. WORRYING TOO MUCH ABOUT DIFFERENT THINGS: SEVERAL DAYS
6. BECOMING EASILY ANNOYED OR IRRITABLE: NOT AT ALL

## 2021-10-05 ASSESSMENT — PAIN SCALES - GENERAL: PAINLEVEL: NO PAIN (0)

## 2021-10-05 ASSESSMENT — MIFFLIN-ST. JEOR: SCORE: 1841.57

## 2021-10-05 ASSESSMENT — PATIENT HEALTH QUESTIONNAIRE - PHQ9: SUM OF ALL RESPONSES TO PHQ QUESTIONS 1-9: 4

## 2021-10-05 NOTE — NURSING NOTE
"Chief Complaint   Patient presents with     Physical       Initial /72 (BP Location: Right arm, Patient Position: Sitting, Cuff Size: Adult Regular)   Pulse 76   Temp 98.1  F (36.7  C) (Tympanic)   Resp 16   Ht 1.8 m (5' 10.87\")   Wt 98.7 kg (217 lb 8 oz)   SpO2 96%   BMI 30.45 kg/m   Estimated body mass index is 30.45 kg/m  as calculated from the following:    Height as of this encounter: 1.8 m (5' 10.87\").    Weight as of this encounter: 98.7 kg (217 lb 8 oz).  Medication Reconciliation: complete  Yen Whitten MA  "

## 2021-10-05 NOTE — PROGRESS NOTES
SUBJECTIVE:   CC: Mikey Jenkins is an 55 year old male who presents for preventative health visit.     Patient has been advised of split billing requirements and indicates understanding: Yes     Healthy Habits:     Getting at least 3 servings of Calcium per day:  Yes    Bi-annual eye exam:  NO    Dental care twice a year:  Yes    Sleep apnea or symptoms of sleep apnea:  Daytime drowsiness, Excessive snoring and Sleep apnea    Diet:  Regular (no restrictions)    Frequency of exercise:  2-3 days/week    Duration of exercise:  15-30 minutes    Taking medications regularly:  No    Medication side effects:  None    PHQ-2 Total Score: 0    Additional concerns today:  No      Hyperlipidemia Follow-Up      Are you regularly taking any medication or supplement to lower your cholesterol?   No - stopped medication due to potential for side effects    Are you having muscle aches or other side effects that you think could be caused by your cholesterol lowering medication?  No     Patient stopped medication due to potential side effects - possible memory issues and potential effects on testosterone    Patient feels memory issues are worsening.  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  He is having more difficulty recalling things without clues, he wonders about seeing Neurology again.    He notes he saw Orthopedics many years ago and was told he would have to have knee replacement at some point.  He states he is very active and worries about being down for surgery.  He does note that pain in his left knee especially is worsening, and he wonders if it is time to do something about it.      He notes hearing is worse and he feels he needs to pursue hearing aides.    He would like a refill of indocin for intermittent use.    He would like a referral for colonoscopy.  He is due for labs today.  He declines flu vaccine.    Today's PHQ-2 Score:   PHQ-2  ( 1999 Pfizer) 9/28/2021   Q1: Little interest or pleasure in doing things 0   Q2: Feeling down, depressed or hopeless 0   PHQ-2 Score 0   Q1: Little interest or pleasure in doing things Not at all   Q2: Feeling down, depressed or hopeless Not at all   PHQ-2 Score 0       Abuse: Current or Past(Physical, Sexual or Emotional)- No  Do you feel safe in your environment? Yes        Social History     Tobacco Use     Smoking status: Never Smoker     Smokeless tobacco: Never Used   Substance Use Topics     Alcohol use: Yes     Comment: 4 beers, weekly     If you drink alcohol do you typically have >3 drinks per day or >7 drinks per week? No    Alcohol Use 10/5/2021   Prescreen: >3 drinks/day or >7 drinks/week? -   Prescreen: >3 drinks/day or >7 drinks/week? No     Last PSA:   PSA   Date Value Ref Range Status   03/07/2018 1.55 0 - 4 ug/L Final     Comment:     Assay Method:  Chemiluminescence using Siemens Vista analyzer       Reviewed orders with patient. Reviewed health maintenance and updated orders accordingly - No  Patient Active Problem List   Diagnosis     Hearing loss     Gout, chronic     ACP (advance care planning)     Hyperlipidemia, unspecified hyperlipidemia type     Erectile dysfunction, unspecified erectile dysfunction type     Male hypogonadism     Arthropathy     Knee joint effusion     Past Surgical History:   Procedure Laterality Date     ankle open reduction internal fixation      RT     C ORAL SURGERY PROCEDURE Left     wisdom tooth scaped      circumcision       HERNIA REPAIR      umbilical     inguinal hernia repair  1987     ORTHOPEDIC SURGERY      right ankle     vasectomy         Social History     Tobacco Use     Smoking status: Never Smoker     Smokeless tobacco: Never Used   Substance Use Topics     Alcohol use: Yes     Comment: 4 beers, weekly     Family History   Problem Relation Age of Onset     C.A.D. Father      Dementia Father      Arthritis Brother         gout         Current  "Outpatient Medications   Medication Sig Dispense Refill     allopurinol (ZYLOPRIM) 300 MG tablet TAKE 1 TABLET BY MOUTH EVERY DAY 90 tablet 1     colchicine (COLCRYS) 0.6 MG tablet Take 2 tablets at the first sign of flare, take 1 additional tablet one hour later. 9 tablet 0     ibuprofen (ADVIL/MOTRIN) 800 MG tablet Take 1 tablet (800 mg) by mouth 3 times daily as needed for mild pain 100 tablet 1     indomethacin (INDOCIN) 25 MG capsule Take 1-2 capsules (25-50 mg) by mouth 3 times daily as needed (gout) 30 capsule 2     sildenafil (REVATIO) 20 MG tablet Take 3-5 (60-100mg) tablets by mouth 1 hour prior to sexual activity 100 tablet 11     sodium chloride (OCEAN) 0.65 % nasal spray Spray 2 sprays in nostril 2 times daily as needed (nose bleeds) 15 mL 0     No Known Allergies    Reviewed and updated as needed this visit by clinical staff  Tobacco  Allergies  Meds  Problems  Med Hx  Surg Hx  Fam Hx  Soc Hx          Reviewed and updated as needed this visit by Provider  Tobacco  Allergies  Meds  Problems  Med Hx  Surg Hx  Fam Hx             Review of Systems  CONSTITUTIONAL: NEGATIVE for fever, chills, change in weight  INTEGUMENTARY/SKIN: NEGATIVE for worrisome rashes, moles or lesions  EYES: NEGATIVE for vision changes or irritation  ENT: NEGATIVE for ear, mouth and throat problems  RESP: NEGATIVE for significant cough or SOB  CV: NEGATIVE for chest pain, palpitations or peripheral edema  GI: NEGATIVE for nausea, abdominal pain, heartburn, or change in bowel habits   male: negative for dysuria, hematuria, decreased urinary stream, erectile dysfunction, urethral discharge  MUSCULOSKELETAL:as above  NEURO: NEGATIVE for weakness, dizziness or paresthesias  PSYCHIATRIC: as above    OBJECTIVE:   /72 (BP Location: Right arm, Patient Position: Sitting, Cuff Size: Adult Regular)   Pulse 76   Temp 98.1  F (36.7  C) (Tympanic)   Resp 16   Ht 1.8 m (5' 10.87\")   Wt 98.7 kg (217 lb 8 oz)   SpO2 96%  "  BMI 30.45 kg/m      Physical Exam  GENERAL: healthy, alert and no distress  EYES: Eyes grossly normal to inspection, PERRL and conjunctivae and sclerae normal  HENT: ear canals and TM's normal, nose and mouth without ulcers or lesions  NECK: no adenopathy  RESP: lungs clear to auscultation - no rales, rhonchi or wheezes  CV: regular rate and rhythm, normal S1 S2, no S3 or S4, no murmur, click or rub, no peripheral edema and peripheral pulses strong  ABDOMEN: soft, nontender, no hepatosplenomegaly, no masses and bowel sounds normal  MS: no gross musculoskeletal defects noted, no edema  SKIN: no suspicious lesions or rashes  NEURO: Normal strength and tone, mentation intact and speech normal  PSYCH: mentation appears normal, affect normal/bright    Diagnostic Test Results:  See orders    ASSESSMENT/PLAN:       ICD-10-CM    1. Routine general medical examination at a health care facility  Z00.00 Basic metabolic panel     Basic metabolic panel   2. Hyperlipidemia, unspecified hyperlipidemia type  E78.5 Lipid Profile (Chol, Trig, HDL, LDL calc)     Lipid Profile (Chol, Trig, HDL, LDL calc)   3. Mild cognitive impairment  G31.84 Adult Neurology Referral   4. Chronic pain of left knee  M25.562 Orthopedic  Referral    G89.29    5. Bilateral hearing loss, unspecified hearing loss type  H91.93 Adult Audiology Referral   6. Chronic gout of multiple sites, unspecified cause  M1A.09X0 indomethacin (INDOCIN) 25 MG capsule   7. Colon cancer screening  Z12.11 Adult General Surg Referral   8. Screening for malignant neoplasm of prostate  Z12.5 PSA, screen       Patient has been advised of split billing requirements and indicates understanding: Yes  COUNSELING:   Reviewed preventive health counseling, as reflected in patient instructions       Immunizations    Declined: Influenza due to Concerns about side effects       Colon cancer screening       Prostate cancer screening    Estimated body mass index is 30.45 kg/m  as  "calculated from the following:    Height as of this encounter: 1.8 m (5' 10.87\").    Weight as of this encounter: 98.7 kg (217 lb 8 oz).      He reports that he has never smoked. He has never used smokeless tobacco.      Counseling Resources:  ATP IV Guidelines  Pooled Cohorts Equation Calculator  FRAX Risk Assessment  ICSI Preventive Guidelines  Dietary Guidelines for Americans, 2010  USDA's MyPlate  ASA Prophylaxis  Lung CA Screening    Anai Le MD  [facility]  "

## 2021-10-06 ASSESSMENT — ANXIETY QUESTIONNAIRES: GAD7 TOTAL SCORE: 4

## 2021-10-08 ENCOUNTER — TELEPHONE (OUTPATIENT)
Dept: OTOLARYNGOLOGY | Facility: OTHER | Age: 56
End: 2021-10-08

## 2021-10-15 ENCOUNTER — TELEPHONE (OUTPATIENT)
Dept: SURGERY | Facility: OTHER | Age: 56
End: 2021-10-15

## 2021-10-15 ENCOUNTER — PREP FOR PROCEDURE (OUTPATIENT)
Dept: SURGERY | Facility: OTHER | Age: 56
End: 2021-10-15

## 2021-10-15 DIAGNOSIS — Z12.11 COLON CANCER SCREENING: Primary | ICD-10-CM

## 2021-10-15 DIAGNOSIS — D12.6 TUBULAR ADENOMA OF COLON: ICD-10-CM

## 2021-10-15 DIAGNOSIS — Z01.818 PREOP TESTING: Primary | ICD-10-CM

## 2021-10-15 NOTE — TELEPHONE ENCOUNTER
Referral received for colonoscopy for screening, history of tubular adenoma x 2 8/9/2016.   This patient was approved by Mechelle Ji, surgery education RN for meet and greet colonoscopy without preop appointment.   Patient scheduled for colonoscopy on 11/11/2021 with Dr. Baez at [facility] with Gatorade bowel prep.   Instructions given via phone and instructions mailed to patient with surgery handbook.   COVID-19 test: 11-6  Preop: not needed

## 2021-10-15 NOTE — LETTER
October 15, 2021          Mikey Jenkins  [address]  [place] [address]      Dear Mikey,     We want your Colonoscopy to be as pleasant as possible. Please review the instructions below. If you have questions, you may contact us at the any of the following numbers:   [facility] Unit Coordinator: [phone]  Clinic Nurse (Suri): [phone]  Surgery Education Nurse: [phone]    Date of procedure: 11/11/2021 with Dr. Cesar Baez  Admit Time: Hospital Surgery will call you the day before your procedure by 5pm with your arrival time. If your surgery is on Monday, expect a call on Friday.  If you are not contacted before 5PM, please call admitting at [phone].   After hours or on weekends, please call [phone] to postpone.   Call the clinic nurse if you become ill within 2 weeks of your procedure to reschedule.      COVID-19 test is needed 4-5 days before procedure. This testing is done at the upper level of [facility] (weekdays and weekends-East Entrance) or at the [facility] (weekday mornings only).  Follow the signage for parking and bring your mobile phone (if you have one) to call the phone number ([phone]) on the sign outside the testing site for check-in. Stay in your vehicle until you are directed to enter. If you do not have a cell phone, please call the nurse for instructions on checking in. This has been scheduled for 11/6/2021 at 11:15am at the [place] site.      Please  the following over the counter items for your bowel prep:    Two 5 mg Dulcolax (bisacodyl) tablets   One 8.3 ounce (238 g) bottle of Miralax   One 10 ounce bottle of liquid Magnesium Citrate-not capsules.   One 64 ounce bottle of Gatorade-Not red, purple, or powdered.   (you will need additional sports drink for the 2 days before colonoscopy)    7 DAYS BEFORE THE EXAM:   11/4  Call the Surgery Education Nurse at [phone] and have a medication list ready.   Stop Aspirin or  NSAIDS (Ibuprofen, Celebrex, Naproxen, etc) 7 days before surgery.  Stop fiber supplements, herbals, vitamins, and iron. Stop eating corn, nuts and seeds.  If you are prescribed a daily 81mg Aspirin, you may continue this.  If you are prescribed blood thinners or insulin, talk to your primary provider for instructions.    2 DAYS BEFORE THE EXAM:   11/9  Follow a Low Fiber Diet-see table at end of instructions  Drink at least 4-6 large glasses of sports drink (separate from the bowel prep). Avoid red and purple.    1 DAY BEFORE THE EXAM:   11/10  No solid food/milk products after 12:00 AM (midnight).   Drink only clear liquids all day, at least 8-10 glasses, including 4-6 glasses of sports drink.   See table at end of instructions  Avoid anything red or purple. No alcohol.            AT 12:00 PM NOON THE DAY BEFORE EXAM:  Take 2 Dulcolax tablets by mouth with clear liquids.            AT 6:00 PM THE DAY BEFORE EXAM:  Mix the bottle of Miralax and 64 oz. of Gatorade in a pitcher.   Drink one 8 oz. glass every 10-15 minutes until gone. Stay near a toilet.     DAY OF COLONOSCOPY:   11/11            6 HOURS PRIOR TO EXAM ON DAY OF PROCEDURE:    Drink the bottle of Magnesium Citrate followed by a full glass of water.   You may have clear liquids up until 2 hours before arrival, then nothing by mouth.  If you need to take any medications after this, take them with a tiny sip of water.   If you have asthma, bring your inhaler with you.  Shower before arrival and wear clean, comfortable clothes.   No jewelry, make-up, nail polish, hair spray, lotions, or perfumes.   [place] in Admitting through the [place] Entrance.   You must have a responsible adult to drive and to stay with you for 4 hours at home.     Low Fiber Diet    You may have Do NOT have   Starches:        White breads (tortillas, biscuits, toast, pancakes, waffles, etc.), white rice, pasta (not whole grain), cooked and peeled potatoes, plain or saltine crackers,  cooked farina or cream of rice, puffed rice, corn flakes, Rice Krispies, Special K.   Starches:       Whole grain breads; Breads containing nuts, seeds or fruit, or more than 1 gram fiber per slice, cornbread, corn or whole wheat tortillas, potatoes with skin, brown rice, wild rice, kasha/buckwheat.   Vegetables:       Tender, well cooked and canned vegetables without seeds or peels including carrots, asparagus tips, green beans, wax  beans, spinach; vegetable broth Vegetables:       Any raw or steamed vegetables, vegetables with seeds, corn in any form   Fruits/Juices:        Strained fruit juice, canned fruit without seeds or skin (not pineapple,) applesauce, pear, ripe bananas, melons (not watermelon) Fruit/Juices:        Prunes, prune juice, raisins or other dried fruits, berries and other fruits with seeds, pineapple, fresh or frozen fruits not listed in other column   Milk Products:       Milk, cheese, cottage cheese, yogurt without berries, custard, ice cream without nuts/fruit Milk Products:       Any dairy product with nuts, seeds or berries   Proteins:       Tender, well-cooked ground beef, lamb, veal, ham, pork, chicken, turkey, fish or organ meats; eggs, creamy peanut butter Proteins:        Tough, fibrous meats with gristle, cooked dried beans, peas or lentils, crunchy peanut butter          Fats and condiments:        Margarine, butter, oils, rasmussen, sour cream, salad dressing, plain gravy, spices, cooked herbs, sugar, clear jelly, honey, syrup Fats and condiments:        Pickles, olives, relish, horseradish, jam, marmalade, preserves   Snacks, sweets and drinks:       Pretzels, hard candy, plain cakes and cookies without nuts or seeds, gelatin, plain pudding, sherbet, popsicles, coffee, tea, carbonated beverages Snacks, sweets and drinks:       Popcorn, nuts, seeds, granola, coconut, candies or desserts with nuts/seeds and raisins/dried fruits or whole grains.       Clear Liquid Diet  You may have: Do  NOT have:     ? Tea, coffee (no cream)   Milk or milk products such as ice cream, malts or shakes     ? Water, Vitamin Water, Smart Water, Coconut Water, PowerAde, Propel, Soda pop, (Sprite, 7 UP, Ginger Ale, Gatorade (not red or purple)   Red or purple drinks of any kind such as  Cranberry juice or grape juice.     ? Clear nutrition drinks (Resource Breeze, Ensure Active protein drink (peach flavor)   Red or purple Jell-O, Popsicles, Olu-Aid, Sorbet and candy.     ? Jell-O, Popsicles (no milk or fruit pieces) or Italian ice (not red or purple)   Juices with pulp such as orange, grapefruit, pineapple or tomato juice     ? Water, Vitamin Water, Smart Water, Coconut Water   Cream soups of any kind     ? Fat-free soup broth or bouillon   Alcohol     ? Plain hard candy, such as clear Life Savers (not red or purple)      ? Powdered Lemonade such as Crystal Light, Country Time      ? Clear juices and fruit-flavored drinks such as apple juice, white grape juice, Hi-C and Olu-Aid (not red or purple)      ? Honey / Sugar          TIPS FOR COLON CLEANSING BEFORE YOUR COLONOSCOPY  To get accurate results from your exam, your colon must be completely empty or you may need to repeat the colon prep and exam. If you followed instructions and your stool is clear or yellow liquid, you are ready. If you are not sure if your colon is clean, please call the clinic nurse.    You may use Tucks wipes, hemorrhoid treatments, hydrocortisone cream or alcohol-free baby wipes to ease anal irritation. You may also use Vaseline to help protect the skin.     Quickly drink each glass. Even when you are sitting on the toilet, keep drinking every 15 minutes. If you have nausea or vomiting, take a break for 30 minutes and then resume drinking.    You will have loose watery stools and may also have chills. Dress for comfort. Expect to feel bloating, nausea and other discomfort until the stool clears from your colon.         Sincerely,        Cesar  MD Nestor/Suri TOVAR LPN

## 2021-10-18 ENCOUNTER — HOSPITAL ENCOUNTER (OUTPATIENT)
Facility: HOSPITAL | Age: 56
End: 2021-10-18
Attending: SURGERY | Admitting: SURGERY
Payer: COMMERCIAL

## 2021-10-20 ENCOUNTER — TELEPHONE (OUTPATIENT)
Dept: FAMILY MEDICINE | Facility: OTHER | Age: 56
End: 2021-10-20

## 2021-10-20 DIAGNOSIS — G47.33 OSA (OBSTRUCTIVE SLEEP APNEA): Primary | ICD-10-CM

## 2021-10-20 NOTE — TELEPHONE ENCOUNTER
9:09 AM    Reason for Call: Phone Call    Description: Patient called and states that he would like to talk to [facility] about getting an [employer] Neuro Implant. States that he is forgetting to use his CPAP and has heard success stories of the implant. Please call patient back for further discussion.     Was an appointment offered for this call? No  If yes : Appointment type              Date    Preferred method for responding to this message: Telephone Call  What is your phone number ? [phone]    If we cannot reach you directly, may we leave a detailed response at the number you provided? Yes    Can this message wait until your PCP/provider returns, if available today? YES, advised call would be 10/21/2021    Sharona Kelsey

## 2021-10-29 ENCOUNTER — TELEPHONE (OUTPATIENT)
Dept: FAMILY MEDICINE | Facility: OTHER | Age: 56
End: 2021-10-29

## 2021-10-29 DIAGNOSIS — G31.84 MILD COGNITIVE IMPAIRMENT: Primary | ICD-10-CM

## 2021-10-29 NOTE — TELEPHONE ENCOUNTER
11:37 AM    Reason for Call: Phone Call    Description: Patient called and states that the dates on the referral are wrong and he needs the referral fixed. Please call patient back for further discussion.     Was an appointment offered for this call? No  If yes : Appointment type              Date    Preferred method for responding to this message: Telephone Call  What is your phone number ? [phone]    If we cannot reach you directly, may we leave a detailed response at the number you provided? Yes    Can this message wait until your PCP/provider returns, if available today? Not applicable, provider is in today     Sharona Kelsey

## 2021-10-29 NOTE — TELEPHONE ENCOUNTER
Needs a neurology extended due to not being able to get in until Feb.  Pt states insurance states the referral is only good until December so he will need an extension.

## 2021-11-01 ENCOUNTER — TELEPHONE (OUTPATIENT)
Dept: FAMILY MEDICINE | Facility: OTHER | Age: 56
End: 2021-11-01

## 2021-11-01 ENCOUNTER — TELEPHONE (OUTPATIENT)
Dept: SURGERY | Facility: OTHER | Age: 56
End: 2021-11-01

## 2021-11-01 NOTE — TELEPHONE ENCOUNTER
He is scheduled for colonoscopy with Dr. Baez for 11/11/2021. COVID test was already scheduled. Will call patient and OR scheduling tomorrow to cancel procedure.

## 2021-11-01 NOTE — TELEPHONE ENCOUNTER
11/1/21    Patient called stating he received a call regarding covid test, upper GI, and colonoscopy. Mikey stated he already completed with Dr. Ordoñez. Please call back    -Suri MCKEON

## 2021-11-01 NOTE — TELEPHONE ENCOUNTER
1:43 PM    Reason for Call: Phone Call    Description:  Pt would like to ask about the [place] guard instead of a colonoscopy.    Was an appointment offered for this call? No  If yes : Appointment type              Date    Preferred method for responding to this message: Telephone Call  What is your phone number ? [phone]    If we cannot reach you directly, may we leave a detailed response at the number you provided? Yes    Can this message wait until your PCP/provider returns, if available today? YES    Heidy Kapadia

## 2021-11-02 NOTE — TELEPHONE ENCOUNTER
Colonoscopy is recommended due to history of polyp, Cologuard is not indicated.  I do see the note he sent to Surgery, when was his colonoscopy with Dr. Ordoñez?

## 2021-11-03 NOTE — TELEPHONE ENCOUNTER
About six years ago.  He states that he will call back to surgery and try to get a different date, it was more of a scheduling issue for him.

## 2021-12-21 ENCOUNTER — MYC MEDICAL ADVICE (OUTPATIENT)
Dept: FAMILY MEDICINE | Facility: OTHER | Age: 56
End: 2021-12-21
Payer: COMMERCIAL

## 2021-12-21 DIAGNOSIS — M25.562 CHRONIC PAIN OF LEFT KNEE: Primary | ICD-10-CM

## 2021-12-21 DIAGNOSIS — G89.29 CHRONIC PAIN OF LEFT KNEE: Primary | ICD-10-CM

## 2021-12-22 ENCOUNTER — ANCILLARY PROCEDURE (OUTPATIENT)
Dept: GENERAL RADIOLOGY | Facility: OTHER | Age: 56
End: 2021-12-22
Attending: SPECIALIST
Payer: COMMERCIAL

## 2021-12-22 ENCOUNTER — OFFICE VISIT (OUTPATIENT)
Dept: ORTHOPEDICS | Facility: OTHER | Age: 56
End: 2021-12-22
Attending: FAMILY MEDICINE
Payer: COMMERCIAL

## 2021-12-22 ENCOUNTER — TELEPHONE (OUTPATIENT)
Dept: ORTHOPEDICS | Facility: OTHER | Age: 56
End: 2021-12-22

## 2021-12-22 VITALS
BODY MASS INDEX: 29.39 KG/M2 | SYSTOLIC BLOOD PRESSURE: 128 MMHG | RESPIRATION RATE: 16 BRPM | WEIGHT: 217 LBS | HEART RATE: 71 BPM | OXYGEN SATURATION: 97 % | HEIGHT: 72 IN | DIASTOLIC BLOOD PRESSURE: 78 MMHG

## 2021-12-22 DIAGNOSIS — M25.562 CHRONIC PAIN OF LEFT KNEE: ICD-10-CM

## 2021-12-22 DIAGNOSIS — G89.29 CHRONIC PAIN OF LEFT KNEE: ICD-10-CM

## 2021-12-22 DIAGNOSIS — M25.562 KNEE PAIN, LEFT: ICD-10-CM

## 2021-12-22 PROCEDURE — 73562 X-RAY EXAM OF KNEE 3: CPT | Mod: TC | Performed by: RADIOLOGY

## 2021-12-22 PROCEDURE — 99203 OFFICE O/P NEW LOW 30 MIN: CPT | Performed by: SPECIALIST

## 2021-12-22 ASSESSMENT — PAIN SCALES - GENERAL: PAINLEVEL: SEVERE PAIN (6)

## 2021-12-22 ASSESSMENT — MIFFLIN-ST. JEOR: SCORE: 1844.37

## 2021-12-22 NOTE — NURSING NOTE
"Chief Complaint   Patient presents with     Consult     left knee pain       Initial /78   Pulse 71   Resp 16   Ht 1.816 m (5' 11.5\")   Wt 98.4 kg (217 lb)   SpO2 97%   BMI 29.84 kg/m   Estimated body mass index is 29.84 kg/m  as calculated from the following:    Height as of this encounter: 1.816 m (5' 11.5\").    Weight as of this encounter: 98.4 kg (217 lb).  Medication Reconciliation: complete  Zandra Andrews LPN    "

## 2021-12-23 NOTE — PROGRESS NOTES
Service Date: 12/22/2021    Ms. Jenkins was seen today for evaluation of his left knee and complained of sharp left knee pain.  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  He has had pain in his knee for quite some time off and on.  He did notice some sharp pain once when he was refereeing a game. Did not recall any specific twisting or pivoting injury to the knee; however, he complained that he has pain in the knee with certain movements and activities, especially pivoting or twisting.  The pain is over the medial joint line.  He has tried some over-the-counter medications, but not improved very much.    PHYSICAL EXAMINATION:  On today's exam, the patient is awake, alert, oriented, no acute distress.  Overall, general mood, affect, appearance are normal.  The patient rates his current pain level a 6/10.  On today's exam, he has tenderness over the medial joint line that is fairly significant with Sol's test.  He has pain over the medial joint line.  He has a stable ligamentous exam.  He does not really have much of an effusion or swelling.  There is no increased warmth or redness.  He is neurovascularly intact distally.  His extensor mechanism is intact.  He can do a straight leg raise against resistance without difficulty.  He flexes also to about 120 degrees of flexion.  X-rays taken today of the left knee included an AP and lateral view standing and a sunrise view. X-ray shows some mild medial joint space narrowing and possibly some patellofemoral arthritis, but are otherwise negative.    ASSESSMENT:  Possible early arthritis versus meniscal tear.  Recommend an MRI to rule out a meniscal tear.  If the patient has an obvious meniscal tear medially, then we might consider arthroscopy.  If this is mainly arthritic without any obvious internal derangement, then we would probably consider conservative management with an injection of Kenalog and lidocaine and continue conservative  treatment for osteoarthritis.    PLAN:  We will get the MRI.  I will contact the patient with results.  In the meantime, he will continue conservative treatment.    Curtis Chow MD        D: 2021   T: 2021   MT: [id number]    Name:     LIZABETH ALEXANDER  MRN:      [id number]        Account:      [id number]   :      1965           Service Date: 2021       Document: [id number]

## 2022-01-03 ENCOUNTER — MEDICAL CORRESPONDENCE (OUTPATIENT)
Dept: MRI IMAGING | Facility: HOSPITAL | Age: 57
End: 2022-01-03
Payer: COMMERCIAL

## 2022-01-11 ENCOUNTER — HOSPITAL ENCOUNTER (OUTPATIENT)
Dept: MRI IMAGING | Facility: HOSPITAL | Age: 57
Discharge: HOME | End: 2022-01-11
Attending: SPECIALIST | Admitting: SPECIALIST
Payer: COMMERCIAL

## 2022-01-11 DIAGNOSIS — M25.562 CHRONIC PAIN OF LEFT KNEE: ICD-10-CM

## 2022-01-11 DIAGNOSIS — G89.29 CHRONIC PAIN OF LEFT KNEE: ICD-10-CM

## 2022-01-11 PROCEDURE — 73721 MRI JNT OF LWR EXTRE W/O DYE: CPT | Mod: LT

## 2022-01-26 ENCOUNTER — OFFICE VISIT (OUTPATIENT)
Dept: ORTHOPEDICS | Facility: OTHER | Age: 57
End: 2022-01-26
Attending: SPECIALIST
Payer: COMMERCIAL

## 2022-01-26 VITALS
OXYGEN SATURATION: 99 % | HEIGHT: 71 IN | HEART RATE: 69 BPM | SYSTOLIC BLOOD PRESSURE: 120 MMHG | BODY MASS INDEX: 28.7 KG/M2 | WEIGHT: 205 LBS | RESPIRATION RATE: 18 BRPM | DIASTOLIC BLOOD PRESSURE: 74 MMHG

## 2022-01-26 DIAGNOSIS — S83.209A TEAR MENISCUS KNEE: Primary | ICD-10-CM

## 2022-01-26 DIAGNOSIS — M1A.09X0 CHRONIC GOUT OF MULTIPLE SITES, UNSPECIFIED CAUSE: ICD-10-CM

## 2022-01-26 PROCEDURE — 99213 OFFICE O/P EST LOW 20 MIN: CPT | Performed by: SPECIALIST

## 2022-01-26 ASSESSMENT — MIFFLIN-ST. JEOR: SCORE: 1782

## 2022-01-26 ASSESSMENT — PAIN SCALES - GENERAL: PAINLEVEL: MILD PAIN (3)

## 2022-01-26 NOTE — NURSING NOTE
"Chief Complaint   Patient presents with     Musculoskeletal Problem     review left knee MRI       Initial /74 (BP Location: Left arm, Cuff Size: Adult Regular)   Pulse 69   Resp 18   Ht 1.803 m (5' 11\")   Wt 93 kg (205 lb)   SpO2 99%   BMI 28.59 kg/m   Estimated body mass index is 28.59 kg/m  as calculated from the following:    Height as of this encounter: 1.803 m (5' 11\").    Weight as of this encounter: 93 kg (205 lb).  Medication Reconciliation: complete  Chiara Carpio LPN  "

## 2022-01-26 NOTE — TELEPHONE ENCOUNTER
indomethicin      Last Written Prescription Date:  10-5-2021  Last Fill Quantity: 30,   # refills: 2  Last Office Visit: 10-5-2021  Future Office visit:    Next 5 appointments (look out 90 days)    Jan 26, 2022  3:15 PM  (Arrive by 3:00 PM)  Return Visit with Curtis Chow MD  [facility] ([facility] ) [address]  [facility] [id number] [phone]           Routing refill request to provider for review/approval because:  Drug not on the [facility], [facility] or [facility] refill protocol or controlled substance

## 2022-01-27 RX ORDER — INDOMETHACIN 25 MG/1
25-50 CAPSULE ORAL 3 TIMES DAILY PRN
Qty: 30 CAPSULE | Refills: 2 | Status: SHIPPED | OUTPATIENT
Start: 2022-01-27 | End: 2023-10-19

## 2022-01-27 NOTE — PROGRESS NOTES
Service Date: 2022    The patient is seen today for a followup for his knee.  He recently had an MRI of his knee.  The MRI showed an obvious tear of the medial meniscus.  The patient was contacted by letter with the results.  He came in to review the MRI results.  He is still symptomatic with knee pain in the left knee, especially over the medial joint line.    PHYSICAL EXAMINATION:  Today on exam, the patient is awake, alert, and oriented, no acute distress.  Overall, general mood, affect and appearance are normal.  On evaluation of the left knee, the patient has good range of motion, no swelling, discoloration, deformity, or effusions.  He is tender over the medial joint line, has pain with Sol's test.  He has a sharp pain over the medial joint line with palpation.  Otherwise, stable ligamentous exam.  Assessment:  Medial meniscal tear.  We reviewed the MRI in detail.  I explained the findings on MRI, he has an obvious medial meniscal tear involving the junction of the posterior horn and body portion.  It is a complex tear. He is still symptomatic.    ASSESSMENT:  Symptomatic complex tear involving the posterior horn and body portion of the medial meniscus, left knee.    PLAN:  We talked about the option. I recommended surgery in the form of arthroscopy to address the meniscal pathology.  The patient agreed and would like to proceed.  My  will contact the patient for scheduling.  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  The patient will be contacted to schedule a left knee arthroscopy with meniscectomy and debridement.    Curtis Chow MD        D: 2022   T: 2022   MT: [id number]    Name:     LIZABETH ALEXANDER  MRN:      [id number]        Account:      [id number]   :      1965           Service Date: 2022       Document: [id number]

## 2022-02-18 ENCOUNTER — TELEPHONE (OUTPATIENT)
Dept: FAMILY MEDICINE | Facility: OTHER | Age: 57
End: 2022-02-18
Payer: COMMERCIAL

## 2022-02-18 NOTE — TELEPHONE ENCOUNTER
((PLS SCHED IN [facility] ON 3/28/21 per Dr Chow)) DOS 3/31/22 @ [facility] & FAX RESULTS TO: [phone] W/DR CHOW

## 2022-03-09 NOTE — PROGRESS NOTES
[facility]  [address]  [address]  [place] [address]  Phone: [phone]  Primary Provider: Rony Le  Pre-op Performing Provider: RONY LE      PREOPERATIVE EVALUATION:  Today's date: 3/14/2022    Mikey Jenkins is a 56 year old male who presents for a preoperative evaluation.    Surgical Information:  Surgery/Procedure: Left Knee Arthroscopy   Surgery Location: [facility]   Surgeon: Dr. Curtis Chow  Surgery Date: 3/31/22  Time of Surgery: TBD  Where patient plans to recover: At home with family  Fax number for surgical facility: PAC to fax    Type of Anesthesia Anticipated: to be determined    Assessment & Plan     The proposed surgical procedure is considered INTERMEDIATE risk.      ICD-10-CM    1. Pre-operative examination  Z01.818 CBC with platelets     EKG 12-lead complete w/read - (Clinic Performed)     Basic metabolic panel     CBC with platelets     Basic metabolic panel   2. Chronic pain of left knee  M25.562     G89.29    3. Hyperlipidemia, unspecified hyperlipidemia type  E78.5 atorvastatin (LIPITOR) 40 MG tablet     Lipid Profile (Chol, Trig, HDL, LDL calc)     ALT     Lipid Profile (Chol, Trig, HDL, LDL calc)     ALT   4. Family history of thyroid disease  Z83.49 TSH with free T4 reflex     TSH with free T4 reflex     T4 free        Possible Sleep Apnea: Monitor symptoms with anesthesia       Risks and Recommendations:  The patient has the following additional risks and recommendations for perioperative complications:   - No identified additional risk factors other than previously addressed    Medication Instructions:  Hold all ASA/NSAIDs/Vitamins/Supplements 7-10 days prior to procedure     RECOMMENDATION:  APPROVAL GIVEN to proceed with proposed procedure, without further diagnostic evaluation.        Subjective     HPI related to upcoming procedure: Left knee pain    Preop Questions 3/7/2022   1. Have you ever had a heart attack or  stroke? No   2. Have you ever had surgery on your heart or blood vessels, such as a stent placement, a coronary artery bypass, or surgery on an artery in your head, neck, heart, or legs? No   3. Do you have chest pain with activity? No   4. Do you have a history of  heart failure? No   5. Do you currently have a cold, bronchitis or symptoms of other infection? No   6. Do you have a cough, shortness of breath, or wheezing? No   7. Do you or anyone in your family have previous history of blood clots? No   8. Do you or does anyone in your family have a serious bleeding problem such as prolonged bleeding following surgeries or cuts? No   9. Have you ever had problems with anemia or been told to take iron pills? No   10. Have you had any abnormal blood loss such as black, tarry or bloody stools? No   11. Have you ever had a blood transfusion? No   12. Are you willing to have a blood transfusion if it is medically needed before, during, or after your surgery? Yes   13. Have you or any of your relatives ever had problems with anesthesia? No   14. Do you have sleep apnea, excessive snoring or daytime drowsiness? YES    14a. Do you have a CPAP machine? Yes   15. Do you have any artifical heart valves or other implanted medical devices like a pacemaker, defibrillator, or continuous glucose monitor? No   16. Do you have artificial joints? No   17. Are you allergic to latex? No       Health Care Directive:  Patient does not have a Health Care Directive or Living Will: Discussed advance care planning with patient; however, patient declined at this time.    Preoperative Review of :   reviewed - no record of controlled substances prescribed.      Status of Chronic Conditions:  HYPERLIPIDEMIA - Patient has a long history of significant Hyperlipidemia.  He had stopped medication due to concerns about memory changes.  Neuropsych recommended he restart statin medication.      Review of Systems  Constitutional, neuro, ENT,  endocrine, pulmonary, cardiac, gastrointestinal, genitourinary, musculoskeletal, integument and psychiatric systems are negative, except as otherwise noted.    Patient Active Problem List    Diagnosis Date Noted     Erectile dysfunction, unspecified erectile dysfunction type 06/28/2018     Priority: Medium     Male hypogonadism 06/28/2018     Priority: Medium     Hyperlipidemia, unspecified hyperlipidemia type 11/07/2016     Priority: Medium     ACP (advance care planning) 05/10/2016     Priority: Medium     Advance Care Planning 5/10/2016: ACP Review of Chart / Resources Provided:  Reviewed chart for advance care plan.  Mikey Jenkins has no plan or code status on file. Discussed available resources and provided with information. Confirmed code status reflects current choices pending further ACP discussions.  Confirmed/documented legally designated decision makers.  Added by Susan Monae           Gout, chronic 09/09/2013     Priority: Medium     Arthropathy 08/01/2013     Priority: Medium     Hearing loss 12/03/2010     Priority: Medium     Knee joint effusion 02/09/2009     Priority: Medium      Past Medical History:   Diagnosis Date     Arthropathy 8/1/2013     Erectile dysfunction, unspecified erectile dysfunction type 6/28/2018     Gout, unspecified 12/3/2010     Hyperlipidemia, unspecified hyperlipidemia type 11/7/2016     Knee joint effusion 2/9/2009     Male hypogonadism 6/28/2018     Unspecified hearing loss 12/3/2010     Past Surgical History:   Procedure Laterality Date     circumcision       HERNIA REPAIR      umbilical     inguinal hernia repair  01/01/1987     ORTHOPEDIC SURGERY      right ankle     vasectomy       ZZC ORAL SURGERY PROCEDURE Left     wisdom tooth scaped      Current Outpatient Medications   Medication Sig Dispense Refill     allopurinol (ZYLOPRIM) 300 MG tablet TAKE 1 TABLET BY MOUTH EVERY DAY 90 tablet 1     atorvastatin (LIPITOR) 40 MG tablet Take 1 tablet (40 mg) by mouth daily  "90 tablet 3     colchicine (COLCRYS) 0.6 MG tablet Take 2 tablets at the first sign of flare, take 1 additional tablet one hour later. 9 tablet 0     ibuprofen (ADVIL/MOTRIN) 800 MG tablet Take 1 tablet (800 mg) by mouth 3 times daily as needed for mild pain 100 tablet 1     indomethacin (INDOCIN) 25 MG capsule Take 1-2 capsules (25-50 mg) by mouth 3 times daily as needed (gout) 30 capsule 2     sildenafil (REVATIO) 20 MG tablet Take 3-5 (60-100mg) tablets by mouth 1 hour prior to sexual activity 100 tablet 11     sodium chloride (OCEAN) 0.65 % nasal spray Spray 2 sprays in nostril 2 times daily as needed (nose bleeds) 15 mL 0       No Known Allergies     Social History     Tobacco Use     Smoking status: Never Smoker     Smokeless tobacco: Never Used   Substance Use Topics     Alcohol use: Yes     Comment: 4 beers, weekly     Family History   Problem Relation Age of Onset     C.A.D. Father      Dementia Father      Arthritis Brother         gout     Thyroid Disease Brother      History   Drug Use No         Objective     /78 (BP Location: Right arm, Patient Position: Chair, Cuff Size: Adult Regular)   Pulse 55   Temp 96.9  F (36.1  C) (Tympanic)   Resp 16   Ht 1.803 m (5' 11\")   Wt 93.5 kg (206 lb 3.2 oz)   SpO2 98%   BMI 28.76 kg/m      Physical Exam    GENERAL APPEARANCE: healthy, alert and no distress     EYES: EOMI,  PERRL     HENT: ear canals and TM's normal and nose and mouth without ulcers or lesions     NECK: no adenopathy     RESP: lungs clear to auscultation - no rales, rhonchi or wheezes     CV: regular rates and rhythm, normal S1 S2, no S3 or S4 and no murmur, click or rub     SKIN: no suspicious lesions or rashes     NEURO: Normal strength and tone, sensory exam grossly normal, mentation intact and speech normal     PSYCH: mentation appears normal. and affect normal/bright    Recent Labs   Lab Test 10/05/21  1115 07/14/20  1043   HGB  --  15.5   PLT  --  185    142   POTASSIUM 4.1 " 4.1   CR 1.20 1.04        Diagnostics:  Recent Results (from the past 168 hour(s))   CBC with platelets    Collection Time: 03/14/22 11:12 AM   Result Value Ref Range    WBC Count 6.0 4.0 - 11.0 10e3/uL    RBC Count 5.32 4.40 - 5.90 10e6/uL    Hemoglobin 15.3 13.3 - 17.7 g/dL    Hematocrit 45.3 40.0 - 53.0 %    MCV 85 78 - 100 fL    MCH 28.8 26.5 - 33.0 pg    MCHC 33.8 31.5 - 36.5 g/dL    RDW 13.3 10.0 - 15.0 %    Platelet Count 167 150 - 450 10e3/uL   Basic metabolic panel    Collection Time: 03/14/22 11:12 AM   Result Value Ref Range    Sodium 140 133 - 144 mmol/L    Potassium 4.0 3.4 - 5.3 mmol/L    Chloride 106 94 - 109 mmol/L    Carbon Dioxide (CO2) 28 20 - 32 mmol/L    Anion Gap 6 3 - 14 mmol/L    Urea Nitrogen 11 7 - 30 mg/dL    Creatinine 1.08 0.66 - 1.25 mg/dL    Calcium 8.9 8.5 - 10.1 mg/dL    Glucose 102 (H) 70 - 99 mg/dL    GFR Estimate 81 >60 mL/min/1.73m2   Lipid Profile (Chol, Trig, HDL, LDL calc)    Collection Time: 03/14/22 11:12 AM   Result Value Ref Range    Cholesterol 186 <200 mg/dL    Triglycerides 497 (H) <150 mg/dL    Direct Measure HDL 29 (L) >=40 mg/dL    LDL Cholesterol Calculated      Non HDL Cholesterol 157 (H) <130 mg/dL    Patient Fasting > 8hrs? Yes    ALT    Collection Time: 03/14/22 11:12 AM   Result Value Ref Range    ALT 22 0 - 70 U/L   TSH with free T4 reflex    Collection Time: 03/14/22 11:12 AM   Result Value Ref Range    TSH 4.34 (H) 0.40 - 4.00 mU/L   T4 free    Collection Time: 03/14/22 11:12 AM   Result Value Ref Range    Free T4 0.76 0.76 - 1.46 ng/dL      EKG: appears normal, NSR, normal axis, normal intervals, no acute ST/T changes c/w ischemia, no LVH by voltage criteria, there are no prior tracings available    Revised Cardiac Risk Index (RCRI):  The patient has the following serious cardiovascular risks for perioperative complications:   - No serious cardiac risks = 0 points     RCRI Interpretation: 0 points: Class I (very low risk - 0.4% complication rate)            Signed Electronically by: Anai Le MD  Copy of this evaluation report is provided to requesting physician.

## 2022-03-14 ENCOUNTER — OFFICE VISIT (OUTPATIENT)
Dept: FAMILY MEDICINE | Facility: OTHER | Age: 57
End: 2022-03-14
Attending: FAMILY MEDICINE
Payer: COMMERCIAL

## 2022-03-14 VITALS
DIASTOLIC BLOOD PRESSURE: 78 MMHG | HEART RATE: 55 BPM | RESPIRATION RATE: 16 BRPM | TEMPERATURE: 96.9 F | BODY MASS INDEX: 28.87 KG/M2 | SYSTOLIC BLOOD PRESSURE: 118 MMHG | HEIGHT: 71 IN | WEIGHT: 206.2 LBS | OXYGEN SATURATION: 98 %

## 2022-03-14 DIAGNOSIS — M25.562 CHRONIC PAIN OF LEFT KNEE: ICD-10-CM

## 2022-03-14 DIAGNOSIS — Z83.49 FAMILY HISTORY OF THYROID DISEASE: ICD-10-CM

## 2022-03-14 DIAGNOSIS — E78.5 HYPERLIPIDEMIA, UNSPECIFIED HYPERLIPIDEMIA TYPE: ICD-10-CM

## 2022-03-14 DIAGNOSIS — Z01.818 PRE-OPERATIVE EXAMINATION: Primary | ICD-10-CM

## 2022-03-14 DIAGNOSIS — G89.29 CHRONIC PAIN OF LEFT KNEE: ICD-10-CM

## 2022-03-14 LAB
ALT SERPL W P-5'-P-CCNC: 22 U/L (ref 0–70)
ANION GAP SERPL CALCULATED.3IONS-SCNC: 6 MMOL/L (ref 3–14)
BUN SERPL-MCNC: 11 MG/DL (ref 7–30)
CALCIUM SERPL-MCNC: 8.9 MG/DL (ref 8.5–10.1)
CHLORIDE BLD-SCNC: 106 MMOL/L (ref 94–109)
CHOLEST SERPL-MCNC: 186 MG/DL
CO2 SERPL-SCNC: 28 MMOL/L (ref 20–32)
CREAT SERPL-MCNC: 1.08 MG/DL (ref 0.66–1.25)
ERYTHROCYTE [DISTWIDTH] IN BLOOD BY AUTOMATED COUNT: 13.3 % (ref 10–15)
FASTING STATUS PATIENT QL REPORTED: YES
GFR SERPL CREATININE-BSD FRML MDRD: 81 ML/MIN/1.73M2
GLUCOSE BLD-MCNC: 102 MG/DL (ref 70–99)
HCT VFR BLD AUTO: 45.3 % (ref 40–53)
HDLC SERPL-MCNC: 29 MG/DL
HGB BLD-MCNC: 15.3 G/DL (ref 13.3–17.7)
LDLC SERPL CALC-MCNC: ABNORMAL MG/DL
MCH RBC QN AUTO: 28.8 PG (ref 26.5–33)
MCHC RBC AUTO-ENTMCNC: 33.8 G/DL (ref 31.5–36.5)
MCV RBC AUTO: 85 FL (ref 78–100)
NONHDLC SERPL-MCNC: 157 MG/DL
PLATELET # BLD AUTO: 167 10E3/UL (ref 150–450)
POTASSIUM BLD-SCNC: 4 MMOL/L (ref 3.4–5.3)
RBC # BLD AUTO: 5.32 10E6/UL (ref 4.4–5.9)
SODIUM SERPL-SCNC: 140 MMOL/L (ref 133–144)
T4 FREE SERPL-MCNC: 0.76 NG/DL (ref 0.76–1.46)
TRIGL SERPL-MCNC: 497 MG/DL
TSH SERPL DL<=0.005 MIU/L-ACNC: 4.34 MU/L (ref 0.4–4)
WBC # BLD AUTO: 6 10E3/UL (ref 4–11)

## 2022-03-14 PROCEDURE — 80048 BASIC METABOLIC PNL TOTAL CA: CPT | Performed by: FAMILY MEDICINE

## 2022-03-14 PROCEDURE — 84439 ASSAY OF FREE THYROXINE: CPT | Performed by: FAMILY MEDICINE

## 2022-03-14 PROCEDURE — 85027 COMPLETE CBC AUTOMATED: CPT | Performed by: FAMILY MEDICINE

## 2022-03-14 PROCEDURE — 36415 COLL VENOUS BLD VENIPUNCTURE: CPT | Performed by: FAMILY MEDICINE

## 2022-03-14 PROCEDURE — 80061 LIPID PANEL: CPT | Performed by: FAMILY MEDICINE

## 2022-03-14 PROCEDURE — 93000 ELECTROCARDIOGRAM COMPLETE: CPT | Mod: 77 | Performed by: INTERNAL MEDICINE

## 2022-03-14 PROCEDURE — 84460 ALANINE AMINO (ALT) (SGPT): CPT | Performed by: FAMILY MEDICINE

## 2022-03-14 PROCEDURE — 99214 OFFICE O/P EST MOD 30 MIN: CPT | Performed by: FAMILY MEDICINE

## 2022-03-14 PROCEDURE — 84443 ASSAY THYROID STIM HORMONE: CPT | Performed by: FAMILY MEDICINE

## 2022-03-14 RX ORDER — ATORVASTATIN CALCIUM 40 MG/1
40 TABLET, FILM COATED ORAL DAILY
Qty: 90 TABLET | Refills: 3 | Status: SHIPPED | OUTPATIENT
Start: 2022-03-14 | End: 2023-05-15

## 2022-03-14 ASSESSMENT — PAIN SCALES - GENERAL: PAINLEVEL: MILD PAIN (3)

## 2022-03-14 NOTE — LETTER
March 24, 2022      Mikey Jenkins  [address] [place] [address]        Dear ,    We are writing to inform you of your test results.    Lipitor was restarted, should recheck cholesterol levels in about 3 months.  Thyroid function is overall normal (TSH is only very mildly elevated and thyroid hormone levels are normal).  Other labs are normal/stable.       Resulted Orders   CBC with platelets   Result Value Ref Range    WBC Count 6.0 4.0 - 11.0 10e3/uL    RBC Count 5.32 4.40 - 5.90 10e6/uL    Hemoglobin 15.3 13.3 - 17.7 g/dL    Hematocrit 45.3 40.0 - 53.0 %    MCV 85 78 - 100 fL    MCH 28.8 26.5 - 33.0 pg    MCHC 33.8 31.5 - 36.5 g/dL    RDW 13.3 10.0 - 15.0 %    Platelet Count 167 150 - 450 10e3/uL   Basic metabolic panel   Result Value Ref Range    Sodium 140 133 - 144 mmol/L    Potassium 4.0 3.4 - 5.3 mmol/L    Chloride 106 94 - 109 mmol/L    Carbon Dioxide (CO2) 28 20 - 32 mmol/L    Anion Gap 6 3 - 14 mmol/L    Urea Nitrogen 11 7 - 30 mg/dL    Creatinine 1.08 0.66 - 1.25 mg/dL    Calcium 8.9 8.5 - 10.1 mg/dL    Glucose 102 (H) 70 - 99 mg/dL    GFR Estimate 81 >60 mL/min/1.73m2      Comment:      Effective December 21, 2021 eGFRcr in adults is calculated using the 2021 CKD-EPI creatinine equation which includes age and gender (Katherine et al., NEJM, DOI: 10.1056/ZBFOjw0817766)   Lipid Profile (Chol, Trig, HDL, LDL calc)   Result Value Ref Range    Cholesterol 186 <200 mg/dL    Triglycerides 497 (H) <150 mg/dL    Direct Measure HDL 29 (L) >=40 mg/dL    LDL Cholesterol Calculated        Comment:      Cannot estimate LDL when triglyceride exceeds 400 mg/dL    Non HDL Cholesterol 157 (H) <130 mg/dL    Patient Fasting > 8hrs? Yes     Narrative    Cholesterol  Desirable:  <200 mg/dL    Triglycerides  Normal:  Less than 150 mg/dL  Borderline High:  150-199 mg/dL  High:  200-499 mg/dL  Very High:  Greater than or equal to 500 mg/dL    Direct Measure HDL  Female:  Greater than or equal to 50 mg/dL   Male:   Greater than or equal to 40 mg/dL    LDL Cholesterol  Desirable:  <100mg/dL  Above Desirable:  100-129 mg/dL   Borderline High:  130-159 mg/dL   High:  160-189 mg/dL   Very High:  >= 190 mg/dL    Non HDL Cholesterol  Desirable:  130 mg/dL  Above Desirable:  130-159 mg/dL  Borderline High:  160-189 mg/dL  High:  190-219 mg/dL  Very High:  Greater than or equal to 220 mg/dL   ALT   Result Value Ref Range    ALT 22 0 - 70 U/L   TSH with free T4 reflex   Result Value Ref Range    TSH 4.34 (H) 0.40 - 4.00 mU/L   T4 free   Result Value Ref Range    Free T4 0.76 0.76 - 1.46 ng/dL       If you have any questions or concerns, please call the clinic at the number listed above.       Sincerely,      Anai Le MD

## 2022-03-14 NOTE — NURSING NOTE
"Chief Complaint   Patient presents with     Pre-Op Exam       Initial /78 (BP Location: Right arm, Patient Position: Chair, Cuff Size: Adult Regular)   Pulse 55   Temp 96.9  F (36.1  C) (Tympanic)   Resp 16   Ht 1.803 m (5' 11\")   Wt 93.5 kg (206 lb 3.2 oz)   SpO2 98%   BMI 28.76 kg/m   Estimated body mass index is 28.76 kg/m  as calculated from the following:    Height as of this encounter: 1.803 m (5' 11\").    Weight as of this encounter: 93.5 kg (206 lb 3.2 oz).  Medication Reconciliation: complete  Pamela M. Lechevalier, LPN    "

## 2022-03-28 ENCOUNTER — OFFICE VISIT (OUTPATIENT)
Dept: FAMILY MEDICINE | Facility: OTHER | Age: 57
End: 2022-03-28
Attending: SPECIALIST
Payer: COMMERCIAL

## 2022-03-28 DIAGNOSIS — Z01.818 PREOP TESTING: ICD-10-CM

## 2022-03-28 PROCEDURE — U0003 INFECTIOUS AGENT DETECTION BY NUCLEIC ACID (DNA OR RNA); SEVERE ACUTE RESPIRATORY SYNDROME CORONAVIRUS 2 (SARS-COV-2) (CORONAVIRUS DISEASE [COVID-19]), AMPLIFIED PROBE TECHNIQUE, MAKING USE OF HIGH THROUGHPUT TECHNOLOGIES AS DESCRIBED BY CMS-2020-01-R: HCPCS

## 2022-03-28 PROCEDURE — U0005 INFEC AGEN DETEC AMPLI PROBE: HCPCS

## 2022-03-29 LAB — SARS-COV-2 RNA RESP QL NAA+PROBE: NEGATIVE

## 2022-04-14 ENCOUNTER — TRANSFERRED RECORDS (OUTPATIENT)
Dept: HEALTH INFORMATION MANAGEMENT | Facility: CLINIC | Age: 57
End: 2022-04-14

## 2022-05-12 ENCOUNTER — TRANSFERRED RECORDS (OUTPATIENT)
Dept: HEALTH INFORMATION MANAGEMENT | Facility: HOSPITAL | Age: 57
End: 2022-05-12

## 2022-05-27 ENCOUNTER — HOSPITAL ENCOUNTER (OUTPATIENT)
Dept: PHYSICAL THERAPY | Facility: OTHER | Age: 57
Discharge: HOME | End: 2022-05-27
Attending: SPECIALIST
Payer: COMMERCIAL

## 2022-05-27 PROCEDURE — 97161 PT EVAL LOW COMPLEX 20 MIN: CPT | Mod: GP

## 2022-05-27 PROCEDURE — 97110 THERAPEUTIC EXERCISES: CPT | Mod: GP

## 2022-05-27 NOTE — PROGRESS NOTES
05/27/22 1000   General Information   Type of Visit Initial OP Ortho PT Evaluation   Start of Care Date 05/27/22   Referring Physician Kay ROY/ Dr. Chow   Patient/Family Goals Statement to be able to run /jog   Orders Evaluate and Treat   Orders Comment Range of motion, strengthening, gait training, balance and proprioception   Date of Order 05/12/22   Certification Required? No   Medical Diagnosis Status post left knee arthroscopy with partial medial and lateral meniscectomies, chondroplasty is, extensive debridement   Surgical/Medical history reviewed Yes   Precautions/Limitations no known precautions/limitations   General Information Comments Past medical history-see chart   Body Part(s)   Body Part(s) Knee   Presentation and Etiology   Pertinent history of current problem (include personal factors and/or comorbidities that impact the POC) Patient reports he underwent left knee arthroscopic surgery on 3/31/22 by Dr. Chow.  He states he was given some exercises and a packet that was sent home with him and he did initially do them however noted increased pain and swelling therefore stopped doing them.  He was last seen in orthopedics for consult/follow-up on 5/12/22 and is now referred to physical therapy he does not believe he has any further follow-ups with orthopedics.   Impairments D. Decreased ROM;E. Decreased flexibility;C. Swelling;A. Pain;F. Decreased strength and endurance   Functional Limitations perform activities of daily living;perform required work activities;perform desired leisure / sports activities   Symptom Location General left knee   How/Where did it occur From insidious onset   Chronicity New   Pain rating (0-10 point scale) Best (/10);Worst (/10)   Best (/10) 0   Worst (/10) 4   Pain quality A. Sharp   Frequency of pain/symptoms B. Intermittent   Pain/symptoms are: The same all the time   Pain/symptoms exacerbated by I. Bending;M. Other   Pain exacerbation comment  Jogging/running, squatting, getting in and out of vehicle, prolonged walking, prolonged standing, hopping   Pain/symptoms eased by C. Rest;E. Changing positions;H. Cold   Progression of symptoms since onset: Improved   Current Level of Function   Patient role/employment history A. 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 No   Have you fallen and had an injury in the past year? No   Is patient a fall risk? No   Abuse Screen (yes response referral indicated)   Feels Unsafe at Home or Work/School no   Feels Threatened by Someone no   Does Anyone Try to Keep You From Having Contact with Others or Doing Things Outside Your Home? no   Physical Signs of Abuse Present no   Patient needs abuse support services and resources No   Knee Objective Findings   Knee ROM Comment 12 to 112 degrees   Knee/Hip Strength Comments Patient is able to straight leg against gravity however extension leg is noted with poor quadriceps contraction   Palpation No tenderness to palpation   Accessory Motion/Joint Mobility Decreased patellar inferior glide   Observation No acute distress   Posture Increased weightbearing on right lower extremity   Side (if bilateral, select both right and left) Left   Left Quad Set Strength Poor   L VMO Strength Poor   Left Gastrocnemius Flexibility Hypomobile   Left Hamstring Flexibility Hypomobile   Left Quadricep Flexibility Hypomobile   Planned Therapy Interventions   Planned Therapy Interventions balance training;gait training;ROM;strengthening;stretching   Planned Modality Interventions   Planned Modality Interventions Comments Modalities as indicated   Clinical Impression   Criteria for Skilled Therapeutic Interventions Met yes, treatment indicated   PT Diagnosis Status post left knee arthroscopic surgery    Influenced by the following impairments Pain, decreased mobility, decreased strength, decreased weightbearing tolerance   Functional limitations due to impairments 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, squatting, bending, getting in and out of car or bathtub, prolonged standing or walking, running, jumping   Clinical Presentation Stable/Uncomplicated   Clinical Presentation Rationale Clinical judgment-no comorbidities that may negatively influence anticipated PT outcome   Clinical Decision Making (Complexity) Low complexity   Therapy Frequency 2 times/Week   Predicted Duration of Therapy Intervention (days/wks) Up to 6 weeks   Risk & Benefits of therapy have been explained Yes   Patient, Family & other staff in agreement with plan of care Yes   Clinical Impression Comments Patient presents status post left knee arthroscopic surgery with decreased mobility and decreased strength and a poor quad contraction   Education Assessment   Barriers to Learning No barriers   ORTHO GOALS   PT Ortho Eval Goals 1;2;3   Ortho Goal 1   Goal Identifier Short-term goal 1   Goal Description Increase left knee range of motion by 5 to 10 degrees   Target Date 06/10/22   Ortho Goal 2   Goal Identifier Long-term goal 1   Goal Description Patient will demonstrate independence with home exercise program   Target Date 07/08/22   Ortho Goal 3   Goal Identifier Long-term goal #2   Goal Description 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

## 2022-06-07 ENCOUNTER — HOSPITAL ENCOUNTER (OUTPATIENT)
Dept: PHYSICAL THERAPY | Facility: OTHER | Age: 57
Discharge: HOME | End: 2022-06-07
Attending: SPECIALIST
Payer: COMMERCIAL

## 2022-06-07 PROCEDURE — 97110 THERAPEUTIC EXERCISES: CPT | Mod: GP

## 2022-06-15 ENCOUNTER — HOSPITAL ENCOUNTER (OUTPATIENT)
Dept: PHYSICAL THERAPY | Facility: OTHER | Age: 57
Discharge: HOME | End: 2022-06-15
Attending: FAMILY MEDICINE
Payer: COMMERCIAL

## 2022-06-15 PROCEDURE — 97110 THERAPEUTIC EXERCISES: CPT | Mod: GP

## 2022-06-22 ENCOUNTER — HOSPITAL ENCOUNTER (OUTPATIENT)
Dept: PHYSICAL THERAPY | Facility: OTHER | Age: 57
Discharge: HOME | End: 2022-06-22
Attending: FAMILY MEDICINE
Payer: COMMERCIAL

## 2022-06-22 PROCEDURE — 97110 THERAPEUTIC EXERCISES: CPT | Mod: GP

## 2022-06-24 DIAGNOSIS — M10.00 IDIOPATHIC GOUT, UNSPECIFIED CHRONICITY, UNSPECIFIED SITE: ICD-10-CM

## 2022-06-27 RX ORDER — ALLOPURINOL 300 MG/1
TABLET ORAL
Qty: 90 TABLET | Refills: 1 | Status: SHIPPED | OUTPATIENT
Start: 2022-06-27 | End: 2022-10-31

## 2022-06-27 NOTE — TELEPHONE ENCOUNTER
allopurinol (ZYLOPRIM) 300 MG tablet      Last Written Prescription Date:  6-30-21  Last Fill Quantity: 90,   # refills: 1  Last Office Visit: 3-14-22  Future Office visit:       Routing refill request to provider for review/approval because:   Has Uric Acid on file in past 12 months and value is less than 6

## 2022-09-01 NOTE — PROGRESS NOTES
"[facility]    Outpatient Physical Therapy Discharge Note  Patient: Mikey Jenkins  : 1965    Beginning/End Dates of Reporting Period:  22 to 22    Referring Provider: Kay ROY/ Dr. Daniels    Therapy Diagnosis: S/P L knee arthroscopy with partial medial and lateral meniscectomies, chondroplasty and extensive debridement     Client Self Report: pt reports the knee as \"pretty good\".    Objective Measurements:  Objective Measure: ROM  Details: L knee = 1-125 ( R = 2-128)                                    Outcome Measures (most recent score):      Goals:  Goal Identifier     Goal Description     Target Date     Date Met      Progress (detail required for progress note):       Goal Identifier     Goal Description     Target Date     Date Met      Progress (detail required for progress note):       Goal Identifier     Goal Description     Target Date     Date Met      Progress (detail required for progress note):       Goal Identifier     Goal Description     Target Date     Date Met      Progress (detail required for progress note):       Goal Identifier     Goal Description     Target Date     Date Met      Progress (detail required for progress note):       Goal Identifier     Goal Description     Target Date     Date Met      Progress (detail required for progress note):       Goal Identifier     Goal Description     Target Date     Date Met      Progress (detail required for progress note):       Goal Identifier     Goal Description     Target Date     Date Met      Progress (detail required for progress note):           Plan:  Discharge from therapy.    Discharge:    Reason for Discharge: Patient chooses to discontinue therapy.    Equipment Issued: none    Discharge Plan: Patient to continue home program.  "

## 2022-09-04 ENCOUNTER — HEALTH MAINTENANCE LETTER (OUTPATIENT)
Age: 57
End: 2022-09-04

## 2022-10-27 DIAGNOSIS — M10.00 IDIOPATHIC GOUT, UNSPECIFIED CHRONICITY, UNSPECIFIED SITE: ICD-10-CM

## 2022-10-31 ENCOUNTER — DOCUMENTATION ONLY (OUTPATIENT)
Dept: FAMILY MEDICINE | Facility: OTHER | Age: 57
End: 2022-10-31

## 2022-10-31 DIAGNOSIS — G47.33 OSA (OBSTRUCTIVE SLEEP APNEA): Primary | ICD-10-CM

## 2022-10-31 RX ORDER — ALLOPURINOL 300 MG/1
TABLET ORAL
Qty: 90 TABLET | Refills: 1 | Status: SHIPPED | OUTPATIENT
Start: 2022-10-31 | End: 2023-06-15

## 2022-10-31 NOTE — TELEPHONE ENCOUNTER
allopurinol (ZYLOPRIM) 300 MG tablet      Last Written Prescription Date:  6-27-22  Last Fill Quantity: 90,   # refills: 1  Last Office Visit: 3-14-22  Future Office visit:       Routing refill request to provider for review/approval because:   Has Uric Acid on file in past 12 months and value is less than 6

## 2023-01-09 ENCOUNTER — OFFICE VISIT (OUTPATIENT)
Dept: FAMILY MEDICINE | Facility: OTHER | Age: 58
End: 2023-01-09
Attending: NURSE PRACTITIONER
Payer: COMMERCIAL

## 2023-01-09 VITALS
HEART RATE: 68 BPM | SYSTOLIC BLOOD PRESSURE: 132 MMHG | RESPIRATION RATE: 16 BRPM | TEMPERATURE: 97.7 F | BODY MASS INDEX: 31.63 KG/M2 | DIASTOLIC BLOOD PRESSURE: 74 MMHG | WEIGHT: 225.9 LBS | HEIGHT: 71 IN | OXYGEN SATURATION: 98 %

## 2023-01-09 DIAGNOSIS — E78.5 HYPERLIPIDEMIA, UNSPECIFIED HYPERLIPIDEMIA TYPE: Primary | ICD-10-CM

## 2023-01-09 DIAGNOSIS — Z12.11 SCREEN FOR COLON CANCER: ICD-10-CM

## 2023-01-09 DIAGNOSIS — J01.00 SUBACUTE MAXILLARY SINUSITIS: ICD-10-CM

## 2023-01-09 DIAGNOSIS — Z12.5 SCREENING FOR PROSTATE CANCER: ICD-10-CM

## 2023-01-09 PROCEDURE — 99214 OFFICE O/P EST MOD 30 MIN: CPT | Performed by: NURSE PRACTITIONER

## 2023-01-09 RX ORDER — AMOXICILLIN 875 MG
875 TABLET ORAL 2 TIMES DAILY
Qty: 20 TABLET | Refills: 0 | Status: SHIPPED | OUTPATIENT
Start: 2023-01-09 | End: 2023-01-19

## 2023-01-09 ASSESSMENT — PAIN SCALES - GENERAL: PAINLEVEL: NO PAIN (0)

## 2023-01-09 ASSESSMENT — PATIENT HEALTH QUESTIONNAIRE - PHQ9: SUM OF ALL RESPONSES TO PHQ QUESTIONS 1-9: 5

## 2023-01-09 NOTE — PROGRESS NOTES
Assessment & Plan        Screen for colon cancer  - Adult General Surg Referral      Screening for prostate cancer  - PROSTATE SPEC ANTIGEN SCREEN; Future      Hyperlipidemia, unspecified hyperlipidemia type  - Lipid Profile (Chol, Trig, HDL, LDL calc); Future  - Comprehensive metabolic panel; Future      Subacute maxillary sinusitis  - amoxicillin (AMOXIL) 875 MG tablet; Take 1 tablet (875 mg) by mouth 2 times daily for 10 days        Mucinex OTC  Insure adequate fluid intake  Get plenty of rest  Monitor for temp at home, treat with OTC Tylenol or Ibuprofen per package instruction.  Humidity at home (add bacteriostatic solution to humidifier)  Please return in you do not improve  To UC or ER with persistent, worsening, or concerning symptoms        Deana Norris, CNP  [facility] [place] Jensen is a 57 year old presenting for the following health issues:  URI        Acute Illness  Acute illness concerns: URI  Onset/Duration: 10 days  Symptoms:  Fever: No  Chills/Sweats: YES  Headache (location?): YES  Sinus Pressure: YES  Conjunctivitis:  YES  Ear Pain: YES- but not all the time  Rhinorrhea: YES  Congestion: YES  Sore Throat: YES-  Cough: YES-non-productive  Wheeze: No  Decreased Appetite: YES  Nausea: YES  Vomiting: No  Diarrhea: YES  Dysuria/Freq.: No  Dysuria or Hematuria: No  Fatigue/Achiness: YES  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  Therapies tried and outcome: cold and flu OTC          PHQ 7/6/2020 10/5/2021 1/9/2023   PHQ-9 Total Score 3 4 5   Q9: Thoughts of better off dead/self-harm past 2 weeks Not at all Not at all Not at all       JOSE-7 SCORE 5/18/2018 7/6/2020 10/5/2021   Total Score 3 0 4           Patient Active Problem List   Diagnosis     Hearing loss     Gout, chronic     ACP (advance care planning)     Hyperlipidemia, unspecified hyperlipidemia type     Erectile dysfunction, unspecified erectile dysfunction type     Male hypogonadism     Arthropathy     Knee joint  effusion     Past Surgical History:   Procedure Laterality Date     circumcision       HERNIA REPAIR      umbilical     inguinal hernia repair  01/01/1987     ORTHOPEDIC SURGERY      right ankle     vasectomy       ZZC ORAL SURGERY PROCEDURE Left     wisdom tooth scaped        Social History     Tobacco Use     Smoking status: Never     Smokeless tobacco: Never   Substance Use Topics     Alcohol use: Yes     Comment: 4 beers, weekly     Family History   Problem Relation Age of Onset     C.A.D. Father      Dementia Father      Arthritis Brother         gout     Thyroid Disease Brother              Current Outpatient Medications   Medication Sig Dispense Refill     allopurinol (ZYLOPRIM) 300 MG tablet TAKE 1 TABLET BY MOUTH EVERY DAY 90 tablet 1     atorvastatin (LIPITOR) 40 MG tablet Take 1 tablet (40 mg) by mouth daily 90 tablet 3     colchicine (COLCRYS) 0.6 MG tablet Take 2 tablets at the first sign of flare, take 1 additional tablet one hour later. 9 tablet 0     ibuprofen (ADVIL/MOTRIN) 800 MG tablet Take 1 tablet (800 mg) by mouth 3 times daily as needed for mild pain 100 tablet 1     indomethacin (INDOCIN) 25 MG capsule Take 1-2 capsules (25-50 mg) by mouth 3 times daily as needed (gout) 30 capsule 2     sildenafil (REVATIO) 20 MG tablet Take 3-5 (60-100mg) tablets by mouth 1 hour prior to sexual activity 100 tablet 11     sodium chloride (OCEAN) 0.65 % nasal spray Spray 2 sprays in nostril 2 times daily as needed (nose bleeds) 15 mL 0       No Known Allergies       Recent Labs   Lab Test 03/14/22  1112 10/05/21  1115 07/14/20  1043 08/20/18  1358 09/22/17  0956 11/07/16  0905 08/08/16  0913   LDL  --   --  45 Cannot estimate LDL when triglyceride exceeds 400 mg/dL 45   < > Cannot estimate LDL when triglyceride exceeds 400 mg/dL   HDL 29* 30* 33* 28* 28*   < > 25*   TRIG 497* 534* 266* 500* 275*   < > 679*   ALT 22  --  36 41 38   < >  --    CR 1.08 1.20 1.04  --   --   --   --    GFRESTIMATED 81 68 81  --    "--   --   --    GFRESTBLACK  --   --  >90  --   --   --   --    POTASSIUM 4.0 4.1 4.1  --   --   --   --    TSH 4.34*  --   --   --   --   --  10.99*    < > = values in this interval not displayed.        BP Readings from Last 3 Encounters:   01/09/23 132/74   03/14/22 118/78   01/26/22 120/74    Wt Readings from Last 3 Encounters:   01/09/23 102.5 kg (225 lb 14.4 oz)   03/14/22 93.5 kg (206 lb 3.2 oz)   01/26/22 93 kg (205 lb)                Review of Systems   Constitutional, HEENT, cardiovascular, pulmonary, gi and gu systems are negative, except as otherwise noted.        Objective    /74 (BP Location: Left arm, Patient Position: Sitting, Cuff Size: Adult Regular)   Pulse 68   Temp 97.7  F (36.5  C) (Tympanic)   Resp 16   Ht 1.803 m (5' 11\")   Wt 102.5 kg (225 lb 14.4 oz)   SpO2 98%   BMI 31.51 kg/m    Body mass index is 31.51 kg/m .       Physical Exam   GENERAL: healthy, alert and no distress  EYES: Eyes grossly normal to inspection, PERRL and conjunctivae and sclerae normal  HENT: sinuses: maxillary, frontal tenderness on both sides, yellow PND  NECK: no adenopathy, no asymmetry, masses, or scars and thyroid normal to palpation  RESP: lungs clear to auscultation - no rales, rhonchi or wheezes  CV: regular rate and rhythm, normal S1 S2, no S3 or S4, no murmur, click or rub, no peripheral edema and peripheral pulses strong  SKIN: no suspicious lesions or rashes  PSYCH: mentation appears normal, affect normal/bright              "

## 2023-01-09 NOTE — PATIENT INSTRUCTIONS
Assessment & Plan        Screen for colon cancer  - Adult General Surg Referral      Screening for prostate cancer  - PROSTATE SPEC ANTIGEN SCREEN; Future      Hyperlipidemia, unspecified hyperlipidemia type  - Lipid Profile (Chol, Trig, HDL, LDL calc); Future  - Comprehensive metabolic panel; Future      Subacute maxillary sinusitis  - amoxicillin (AMOXIL) 875 MG tablet; Take 1 tablet (875 mg) by mouth 2 times daily for 10 days        Mucinex OTC  Insure adequate fluid intake  Get plenty of rest  Monitor for temp at home, treat with OTC Tylenol or Ibuprofen per package instruction.  Humidity at home (add bacteriostatic solution to humidifier)  Please return in you do not improve  To UC or ER with persistent, worsening, or concerning symptoms        Deana Norris, CNP  [facility] [place]

## 2023-01-15 ENCOUNTER — HEALTH MAINTENANCE LETTER (OUTPATIENT)
Age: 58
End: 2023-01-15

## 2023-01-26 ENCOUNTER — TELEPHONE (OUTPATIENT)
Dept: FAMILY MEDICINE | Facility: OTHER | Age: 58
End: 2023-01-26

## 2023-02-20 NOTE — PROGRESS NOTES
"  Assessment & Plan     Condyloma acuminata  Cryotherapy today, follow-up as needed.      WILMA MAGAÑA,   [facility] - [place]    Subjective   Mikey is a 57 year old, presenting for the following health issues:  Wart      HPI     Mikey is a 57 year old male who reports history of genital warts.  He reports cryotherapy in the past has worked for previous warts.  He currently has a proximal dorsal wart he'd like to get frozen, no previous wart in this location.  No other concerns today.    He states he is undergoing injections and also using a mechanical straightening device for Peyronie's disease.    Warts  Onset/Duration: About a year ago  Description (location/number): Front area of penis  Accompanying signs and symptoms (pain, redness): No  History: prior warts: YES  Therapies tried and outcome: liquid nitrogen          Review of Systems         Objective    /78   Pulse 58   Temp 97.6  F (36.4  C) (Tympanic)   Resp 16   Ht 1.803 m (5' 11\")   Wt 96.8 kg (213 lb 4.8 oz)   SpO2 98%   BMI 29.75 kg/m    Body mass index is 29.75 kg/m .  Physical Exam  Constitutional:       General: He is not in acute distress.     Appearance: Normal appearance.   Pulmonary:      Effort: Pulmonary effort is normal.   Skin:     Comments: Small 3-4 mm condyloma acuminata proximal dorsal penis shaft   Neurological:      Mental Status: He is alert.        Procedure: Cryotherapy destruction of genital wart.  Indication: genital wart, bothering him.  Verbal consent obtained.  Discussion of risks, benefits, alternatives.  Specifically we discussed the risk for pain, bleeding/swelling, infection, scarring, treatment failure/recurrence, other unseen complications.  Patient wishes to proceed.  Area of skin lesion(s) cleaned with alcohol swabs.  Cryotherapy with liquid nitrogen performed using spray gun - 3 freeze/thaw cycles.  Patient tolerated well without any significant discomfort or complications.  Antibiotic " ointment (Triple abx ointment) applied.  Patient will monitor for infection and will call if any concerns develop.  If lesion(s) persist in 3 weeks, follow-up for repeat treatment.  Number of lesions treated today: 1.

## 2023-02-21 ENCOUNTER — OFFICE VISIT (OUTPATIENT)
Dept: FAMILY MEDICINE | Facility: OTHER | Age: 58
End: 2023-02-21
Attending: FAMILY MEDICINE
Payer: COMMERCIAL

## 2023-02-21 VITALS
OXYGEN SATURATION: 98 % | DIASTOLIC BLOOD PRESSURE: 78 MMHG | RESPIRATION RATE: 16 BRPM | WEIGHT: 213.3 LBS | HEART RATE: 58 BPM | HEIGHT: 71 IN | BODY MASS INDEX: 29.86 KG/M2 | TEMPERATURE: 97.6 F | SYSTOLIC BLOOD PRESSURE: 114 MMHG

## 2023-02-21 DIAGNOSIS — A63.0 CONDYLOMA ACUMINATA: Primary | ICD-10-CM

## 2023-02-21 PROCEDURE — 54056 CRYOSURGERY PENIS LESION(S): CPT | Performed by: FAMILY MEDICINE

## 2023-02-21 ASSESSMENT — PAIN SCALES - GENERAL: PAINLEVEL: NO PAIN (0)

## 2023-03-06 ENCOUNTER — MYC MEDICAL ADVICE (OUTPATIENT)
Dept: FAMILY MEDICINE | Facility: OTHER | Age: 58
End: 2023-03-06

## 2023-03-06 ENCOUNTER — TELEPHONE (OUTPATIENT)
Dept: FAMILY MEDICINE | Facility: OTHER | Age: 58
End: 2023-03-06

## 2023-03-06 DIAGNOSIS — Z12.11 COLON CANCER SCREENING: Primary | ICD-10-CM

## 2023-03-06 DIAGNOSIS — Z12.11 ENCOUNTER FOR SCREENING COLONOSCOPY: Primary | ICD-10-CM

## 2023-03-06 RX ORDER — BISACODYL 5 MG
10 TABLET, DELAYED RELEASE (ENTERIC COATED) ORAL ONCE
Qty: 2 TABLET | Refills: 0 | Status: SHIPPED | OUTPATIENT
Start: 2023-03-06 | End: 2023-03-06

## 2023-03-06 NOTE — TELEPHONE ENCOUNTER
Patient scheduled screening colonoscopy 03/30/23 with Dr. Britton, instruction booklet mailed today.

## 2023-03-06 NOTE — TELEPHONE ENCOUNTER
Screening Questions for the Scheduling of Screening Colonoscopies     (If Colonoscopy is diagnostic, Provider should review the chart before scheduling.)    Are you younger than 50 or older than 80?  Non    Do you take aspirin or fish oil?  No (if yes, tell patient to stop 1 week prior to Colonoscopy)    Do you take warfarin (Coumadin), clopidogrel (Plavix), apixaban (Eliquis), dabigatram (Pradaxa), rivaroxaban (Xarelto) or any blood thinner? No    Do you use oxygen at home?  No    Do you have kidney disease? No    Are you on dialysis? No    Have you had a stroke or heart attack in the last year? No    Have you had a stent in your heart or any blood vessel in the last year? No    Have you had a transplant of any organ?  No    Have you had a colonoscopy or upper endoscopy (EGD) before?  YES. Date-.         Date of scheduled Colonoscopy: 3/30/23     Provider: Dr Britton     [facility]

## 2023-04-15 ENCOUNTER — MYC MEDICAL ADVICE (OUTPATIENT)
Dept: FAMILY MEDICINE | Facility: OTHER | Age: 58
End: 2023-04-15

## 2023-04-25 RX ORDER — BISACODYL 5 MG/1
10 TABLET, DELAYED RELEASE ORAL ONCE
Qty: 2 TABLET | Refills: 0 | Status: SHIPPED | OUTPATIENT
Start: 2023-04-25 | End: 2023-04-25

## 2023-04-25 NOTE — OR NURSING
Instructed to hold NSAIDs, ASA, All vitamins, supplements & Herbal starting today.  Continue other prescribed medications as usual up to night before. NO medications to take On day of surgery.  Patient verbalized understanding.

## 2023-04-27 ENCOUNTER — ANESTHESIA EVENT (OUTPATIENT)
Dept: SURGERY | Facility: HOSPITAL | Age: 58
End: 2023-04-27
Payer: COMMERCIAL

## 2023-04-27 NOTE — ANESTHESIA PREPROCEDURE EVALUATION
Anesthesia Pre-Procedure Evaluation    Patient: Mikey Jenkins   MRN: [id number] : 1965        Procedure : Procedure(s):  COLONOSCOPY          Past Medical History:   Diagnosis Date     Arthropathy 2013     Erectile dysfunction, unspecified erectile dysfunction type 2018     Gout, unspecified 12/3/2010     Hyperlipidemia, unspecified hyperlipidemia type 2016     Knee joint effusion 2009     Male hypogonadism 2018     Unspecified hearing loss 12/3/2010      Past Surgical History:   Procedure Laterality Date     circumcision       HERNIA REPAIR      umbilical     inguinal hernia repair  1987     ORTHOPEDIC SURGERY      right ankle     vasectomy       ZZC ORAL SURGERY PROCEDURE Left     wisdom tooth scaped       No Known Allergies   Social History     Tobacco Use     Smoking status: Never     Smokeless tobacco: Never   Vaping Use     Vaping status: Not on file   Substance Use Topics     Alcohol use: Yes     Comment: 4 beers, weekly      Wt Readings from Last 1 Encounters:   23 96.8 kg (213 lb 4.8 oz)        Anesthesia Evaluation   Pt has had prior anesthetic.     No history of anesthetic complications       ROS/MED HX  ENT/Pulmonary:     (+) TIERRA risk factors, snores loudly, observed stopped breathing,     Neurologic: Comment: Hearing loss      Cardiovascular:     (+) Dyslipidemia -----    METS/Exercise Tolerance: >4 METS    Hematologic:  - neg hematologic  ROS     Musculoskeletal:   (+) arthritis (gout  ),     GI/Hepatic:     (+) bowel prep,     Renal/Genitourinary:  - neg Renal ROS     Endo: Comment: Male hypogonadism      Psychiatric/Substance Use:     (+) alcohol abuse (occasionally more than 3 drinks in a sitting)     Infectious Disease:  - neg infectious disease ROS     Malignancy:  - neg malignancy ROS     Other:  - neg other ROS          Physical Exam    Airway  airway exam normal      Mallampati: III   TM distance: > 3 FB   Neck ROM: full   Mouth opening: > 3  cm    Respiratory Devices and Support         Dental       (+) Completely normal teeth      Cardiovascular   cardiovascular exam normal       Rhythm and rate: regular and normal     Pulmonary   pulmonary exam normal        breath sounds clear to auscultation           OUTSIDE LABS:  CBC:   Lab Results   Component Value Date    WBC 6.0 03/14/2022    WBC 4.9 07/14/2020    HGB 15.3 03/14/2022    HGB 15.5 07/14/2020    HCT 45.3 03/14/2022    HCT 43.4 07/14/2020     03/14/2022     07/14/2020     BMP:   Lab Results   Component Value Date     03/14/2022     10/05/2021    POTASSIUM 4.0 03/14/2022    POTASSIUM 4.1 10/05/2021    CHLORIDE 106 03/14/2022    CHLORIDE 107 10/05/2021    CO2 28 03/14/2022    CO2 29 10/05/2021    BUN 11 03/14/2022    BUN 10 10/05/2021    CR 1.08 03/14/2022    CR 1.20 10/05/2021     (H) 03/14/2022     (H) 10/05/2021     COAGS: No results found for: PTT, INR, FIBR  POC: No results found for: BGM, HCG, HCGS  HEPATIC:   Lab Results   Component Value Date    ALT 22 03/14/2022     OTHER:   Lab Results   Component Value Date    STEWART 8.9 03/14/2022    TSH 4.34 (H) 03/14/2022    T4 0.76 03/14/2022       Anesthesia Plan    ASA Status:  2   NPO Status:  NPO Appropriate    Anesthesia Type: MAC.              Consents    Anesthesia Plan(s) and associated risks, benefits, and realistic alternatives discussed. Questions answered and patient/representative(s) expressed understanding.     - Discussed: Risks, Benefits and Alternatives for BOTH SEDATION and the PROCEDURE were discussed     - Discussed with:  Patient      - Extended Intubation/Ventilatory Support Discussed: No.      - Patient is DNR/DNI Status: No    Use of blood products discussed: No .     Postoperative Care            Comments:    Other Comments: Risks and benefits of MAC anesthetic discussed including dental damage, aspiration, loss of airway, conversion to general anesthetic, CV complications, MI, stroke,  death. Pt wishes to proceed.             Kelly Freeman, OSIRIS CRNA

## 2023-04-29 DIAGNOSIS — E78.5 HYPERLIPIDEMIA, UNSPECIFIED HYPERLIPIDEMIA TYPE: ICD-10-CM

## 2023-05-01 NOTE — TELEPHONE ENCOUNTER
Atorvastatin 40mg      Last Written Prescription Date:  03/14/2022  Last Fill Quantity: 90,   # refills: 3  Last Office Visit: 02/21/2023  Future Office visit:       Routing refill request to provider for review/approval because:  Statins Protocol Failed    Rerun Protocol (4/29/2023 3:10 AM)    LDL on file in past 12 months        Recent Labs   Lab Test 07/14/20  1043   LDL 45

## 2023-05-04 ENCOUNTER — ANESTHESIA (OUTPATIENT)
Dept: SURGERY | Facility: HOSPITAL | Age: 58
End: 2023-05-04
Payer: COMMERCIAL

## 2023-05-04 ENCOUNTER — HOSPITAL ENCOUNTER (OUTPATIENT)
Facility: HOSPITAL | Age: 58
Discharge: HOME | End: 2023-05-04
Attending: SURGERY | Admitting: SURGERY
Payer: COMMERCIAL

## 2023-05-04 VITALS
RESPIRATION RATE: 14 BRPM | TEMPERATURE: 97.1 F | SYSTOLIC BLOOD PRESSURE: 117 MMHG | HEART RATE: 64 BPM | DIASTOLIC BLOOD PRESSURE: 68 MMHG | OXYGEN SATURATION: 97 %

## 2023-05-04 PROCEDURE — 370N000017 HC ANESTHESIA TECHNICAL FEE, PER MIN: Performed by: SURGERY

## 2023-05-04 PROCEDURE — 88305 TISSUE EXAM BY PATHOLOGIST: CPT | Mod: 26 | Performed by: PATHOLOGY

## 2023-05-04 PROCEDURE — 999N000141 HC STATISTIC PRE-PROCEDURE NURSING ASSESSMENT: Performed by: SURGERY

## 2023-05-04 PROCEDURE — 250N000009 HC RX 250: Performed by: NURSE ANESTHETIST, CERTIFIED REGISTERED

## 2023-05-04 PROCEDURE — 45380 COLONOSCOPY AND BIOPSY: CPT | Mod: PT | Performed by: SURGERY

## 2023-05-04 PROCEDURE — 250N000011 HC RX IP 250 OP 636: Performed by: NURSE ANESTHETIST, CERTIFIED REGISTERED

## 2023-05-04 PROCEDURE — 45380 COLONOSCOPY AND BIOPSY: CPT | Performed by: NURSE ANESTHETIST, CERTIFIED REGISTERED

## 2023-05-04 PROCEDURE — 272N000001 HC OR GENERAL SUPPLY STERILE: Performed by: SURGERY

## 2023-05-04 PROCEDURE — 258N000003 HC RX IP 258 OP 636: Performed by: NURSE ANESTHETIST, CERTIFIED REGISTERED

## 2023-05-04 PROCEDURE — 710N000012 HC RECOVERY PHASE 2, PER MINUTE: Performed by: SURGERY

## 2023-05-04 PROCEDURE — 360N000075 HC SURGERY LEVEL 2, PER MIN: Performed by: SURGERY

## 2023-05-04 PROCEDURE — 88305 TISSUE EXAM BY PATHOLOGIST: CPT | Mod: TC | Performed by: SURGERY

## 2023-05-04 RX ORDER — PROPOFOL 10 MG/ML
INJECTION, EMULSION INTRAVENOUS PRN
Status: DISCONTINUED | OUTPATIENT
Start: 2023-05-04 | End: 2023-05-04

## 2023-05-04 RX ORDER — LIDOCAINE 40 MG/G
CREAM TOPICAL
Status: DISCONTINUED | OUTPATIENT
Start: 2023-05-04 | End: 2023-05-04 | Stop reason: HOSPADM

## 2023-05-04 RX ORDER — LIDOCAINE HYDROCHLORIDE 20 MG/ML
INJECTION, SOLUTION INFILTRATION; PERINEURAL PRN
Status: DISCONTINUED | OUTPATIENT
Start: 2023-05-04 | End: 2023-05-04

## 2023-05-04 RX ORDER — GLYCOPYRROLATE 0.2 MG/ML
INJECTION, SOLUTION INTRAMUSCULAR; INTRAVENOUS PRN
Status: DISCONTINUED | OUTPATIENT
Start: 2023-05-04 | End: 2023-05-04

## 2023-05-04 RX ORDER — SODIUM CHLORIDE, SODIUM LACTATE, POTASSIUM CHLORIDE, CALCIUM CHLORIDE 600; 310; 30; 20 MG/100ML; MG/100ML; MG/100ML; MG/100ML
INJECTION, SOLUTION INTRAVENOUS CONTINUOUS
Status: DISCONTINUED | OUTPATIENT
Start: 2023-05-04 | End: 2023-05-04 | Stop reason: HOSPADM

## 2023-05-04 RX ADMIN — PROPOFOL 80 MG: 10 INJECTION, EMULSION INTRAVENOUS at 11:56

## 2023-05-04 RX ADMIN — PROPOFOL 30 MG: 10 INJECTION, EMULSION INTRAVENOUS at 12:07

## 2023-05-04 RX ADMIN — LIDOCAINE HYDROCHLORIDE 40 MG: 20 INJECTION, SOLUTION INFILTRATION; PERINEURAL at 11:56

## 2023-05-04 RX ADMIN — SODIUM CHLORIDE, POTASSIUM CHLORIDE, SODIUM LACTATE AND CALCIUM CHLORIDE: 600; 310; 30; 20 INJECTION, SOLUTION INTRAVENOUS at 11:27

## 2023-05-04 RX ADMIN — PROPOFOL 70 MG: 10 INJECTION, EMULSION INTRAVENOUS at 11:59

## 2023-05-04 RX ADMIN — GLYCOPYRROLATE 0.2 MG: 0.2 INJECTION, SOLUTION INTRAMUSCULAR; INTRAVENOUS at 11:57

## 2023-05-04 RX ADMIN — PROPOFOL 50 MG: 10 INJECTION, EMULSION INTRAVENOUS at 12:03

## 2023-05-04 ASSESSMENT — ACTIVITIES OF DAILY LIVING (ADL): ADLS_ACUITY_SCORE: 35

## 2023-05-04 NOTE — H&P
HISTORY AND PHYSICAL - ENDOSCOPY   5/4/2023    Patient : Mikey Jenkins    Planned Procedures : Colonoscopy    This is a 57 year old male with a need for a colonoscopy for Screening colonoscopy, History of Colon Polyps and Family History of Colon Cancer.      Past Medical History:  Past Medical History:   Diagnosis Date     Arthropathy 8/1/2013     Erectile dysfunction, unspecified erectile dysfunction type 6/28/2018     Gout, unspecified 12/3/2010     Hyperlipidemia, unspecified hyperlipidemia type 11/7/2016     Knee joint effusion 2/9/2009     Male hypogonadism 6/28/2018     Unspecified hearing loss 12/3/2010       Past Surgical History:  Past Surgical History:   Procedure Laterality Date     circumcision       HERNIA REPAIR      umbilical     inguinal hernia repair  01/01/1987     ORTHOPEDIC SURGERY      right ankle     vasectomy       ZZC ORAL SURGERY PROCEDURE Left     wisdom tooth scaped        Family History History:  Family History   Problem Relation Age of Onset     C.A.D. Father      Dementia Father      Arthritis Brother         gout     Thyroid Disease Brother        History of Tobacco Use:  History   Smoking Status     Never   Smokeless Tobacco     Never       Current Medications:  No current outpatient medications on file.       Allergies:  No Known Allergies    ROS:  Pertinent items are noted in HPI.  All other systems are negative.    PHYSICAL EXAM:     Vital signs: /90   Pulse 67   Temp 97.6  F (36.4  C) (Tympanic)   Resp 20   SpO2 100%    Weight: [unfilled]   BMI: There is no height or weight on file to calculate BMI.   General: Normal, healthy, cooperative, in no acute distress, alert   Skin: no jaundice   HEENT: PERRLA and EOMI   Neck: supple   Lungs: clear to auscultation   CV: Regular rate and rhythm without murmer   Abdominal: abdomen is soft without significant tenderness, masses, organomegaly or guarding   Extremities: No cyanosis, clubbing or edema noted bilaterally in Upper and Lower  Extremities   Neurological: without deficit    Assessment:   57 year old male with need of a colonoscopy for Screening colonoscopy, History of Colon Polyps and Family History of Colon Cancer:    Plan:   Will plan on doing a colonoscopy.      The risks, benefits, and alternatives to the planned procedure were fully discussed with the patient and/or the patient's representative(s). The risks of bleeding, infection, death, missing pathology, the need for additional procedures intra-operatively, the possible need for intra-operative consults, the possible need for transfusion therapy, cardiopulmonary compromise, the possible need for additional surgery for a complication were discussed with the patient and/or the patient's representative(s). The patient's and/or patient's representative(s) questions were addressed and answered. Informed consent was obtained from the patient and/or the patient's representative(s). The patient and/or the patient's representative(s) consent to proceed.    Specific risks:  Risks include but are not limited to bleeding, perforation, missing lesions, need for additional procedures, reaction to anesthesia.  All the patients questions were answered.  The patient consents to proceed.  The procedures will be scheduled.

## 2023-05-04 NOTE — ANESTHESIA CARE TRANSFER NOTE
Patient: Mikey Jenkins    Procedure: Procedure(s):  COLONOSCOPY WITH POLYPECTOMY       Diagnosis: Encounter for screening colonoscopy [Z12.11]  Diagnosis Additional Information: No value filed.    Anesthesia Type:   MAC     Note:    Oropharynx: oropharynx clear of all foreign objects and spontaneously breathing  Level of Consciousness: drowsy  Oxygen Supplementation: room air    Independent Airway: airway patency satisfactory and stable  Dentition: dentition unchanged  Vital Signs Stable: post-procedure vital signs reviewed and stable  Report to RN Given: handoff report given  Patient transferred to: Phase II    Handoff Report: Identifed the Patient, Identified the Reponsible Provider, Reviewed the pertinent medical history, Discussed the surgical course, Reviewed Intra-OP anesthesia mangement and issues during anesthesia, Set expectations for post-procedure period and Allowed opportunity for questions and acknowledgement of understanding      Vitals:  Vitals Value Taken Time   BP     Temp     Pulse     Resp     SpO2         Electronically Signed By: OSIRIS Boyer CRNA  May 4, 2023  12:12 PM

## 2023-05-04 NOTE — ANESTHESIA POSTPROCEDURE EVALUATION
Patient: Mikey Jenkins    Procedure: Procedure(s):  COLONOSCOPY WITH POLYPECTOMY       Anesthesia Type:  MAC    Note:  Disposition: Outpatient   Postop Pain Control: Uneventful            Sign Out: Well controlled pain   PONV: No   Neuro/Psych: Uneventful            Sign Out: Acceptable/Baseline neuro status   Airway/Respiratory: Uneventful            Sign Out: Acceptable/Baseline resp. status   CV/Hemodynamics: Uneventful            Sign Out: Acceptable CV status; No obvious hypovolemia; No obvious fluid overload   Other NRE: NONE   DID A NON-ROUTINE EVENT OCCUR? No           Last vitals:  Vitals Value Taken Time   /68 05/04/23 1230   Temp 97.1  F (36.2  C) 05/04/23 1215   Pulse 64 05/04/23 1230   Resp 14 05/04/23 1230   SpO2 99 % 05/04/23 1243   Vitals shown include unvalidated device data.    Electronically Signed By: OSIRIS Lund CRNA  May 4, 2023  1:01 PM

## 2023-05-04 NOTE — OP NOTE
REPORT OF OPERATION  DATE OF PROCEDURE: 5/4/2023    PATIENT: Mikey Jenkins    SURGERY PERFORMED:   Colonoscopy     with biopsy    without use of cauterized snare    without tattooing    PREOPERATIVE DIAGNOSIS: Screening colonoscopy, History of Colon Polyps and Family History of Colon Cancer    POSTOPERATIVE DIAGNOSIS:    Same   Colon polyps, 70 and 30 cm   Diverticulosis was identified.   Hemorrhoids  were  identified.    SURGEON: Han Britton MD    ASSISTANTS: None    ANESTHESIA: Monitored Anesthesia Care    COMPLICATIONS: None apparent    TRANSFUSIONS: None     TISSUE TO PATHOLOGY: Polyps from 70 and 30 cm were sent to pathology for pathological diagnosis.    FINDINGS: Colon polyps, Cecum and Descending colon.  Diverticulosis was identified.  Hemorrhoids  were  identified.    INDICATIONS: This is a 57 year old male in need of a colonoscopy for Screening colonoscopy, History of Colon Polyps and Family History of Colon Cancer.  The patient will be taken to the endoscopy suite for that procedure.    DESCRIPTIONS OF PROCEDURE IN DETAIL: After consent was obtained the patient was taken to the endoscopy suite and placed in the left lateral decubitus position.  The patient was identified and the correct patient was confirmed.  Monitored Anesthesia Care was given.  A time out was performed verifying the correct patient and the correct procedure.  The entire operative team was in agreement.  All necessary equipment and supplies were in the room.    Rectal exam was performed and no lesions of the anal canal were noted.  The colonoscope was inserted into the anus and passed without difficulty to the cecum.  The cecum was identified by the ileocecal valve, the coalescence of the tinea and the appendiceal orifice.  Upon withdrawal all walls of the colon were visualized.  Polyps were identified at 70 and 30 cm.  These were removed by cold biopsy forceps.  Tattooing their of the location was not done.  Large colon masses and  colitis were not seen.colon  Diverticulosis was seen.  Upon reaching the rectum the scope was retroflexed and internal hemorrhoids  were  seen.  The scope was straightened back out and removed from the patient.  The patient was then taken to the recovery room in stable condition tolerating the procedure well.      Prep: fair    Withdrawal time was 8 minutes.    It is recommended that the patient have another colonoscopy in 5 years.

## 2023-05-04 NOTE — DISCHARGE INSTRUCTIONS
It is recommended that the patient have another colonoscopy in 5 years        INSTRUCTIONS AFTER COLONOSCOPY    WHEN YOU ARE BACK HOME:  Plan to rest for an hour or two after you get home.  You may have some cramping or pressure until you pass gas.  You may resume your regular medications.  Eat a small, light meal at first, and then gradually return to normal meal sizes.  You will be contacted the next day to see how you are doing.  If you had a polyp removed:  Slight bleeding may occur.  You may have a slight blood stain on the toilet paper after a bowel movement.  To lessen the chance of bleeding, avoid heavy exercise for ONE WEEK.  This includes heavy lifting, vigorous sport activities, and heavy physical labor.  You may resume your normal sexual activity.    Avoid aspirin or aspirin products if instructed by your doctor.  If there is a polyp or biopsy, you will be contacted with results within one week.     WHAT TO WATCH FOR:  Problems rarely occur after the exam; however, it is important for you to watch for early signs of possible problems.  If you have   Unusual pain in your abdomen  Nausea and vomiting that persists  Excessive bleeding  Black or bloody bowel movements  Fever or temperature above 100.6 F  Please call your doctor ([facility] [phone]) or go to the nearest hospital emergency room.    Post-Anesthesia Patient Instructions    IMMEDIATELY FOLLOWING SURGERY:  Do not drive or operate machinery for the first twenty four hours after surgery.  Do not make any important decisions for twenty four hours after surgery or while taking narcotic pain medications or sedatives.  If you develop intractable nausea and vomiting or a severe headache please notify your doctor immediately.    FOLLOW-UP:  Please make an appointment with your surgeon as instructed. You do not need to follow up with anesthesia unless specifically instructed to do so.    WOUND CARE INSTRUCTIONS (if applicable):  Keep a dry clean  dressing on the anesthesia/puncture wound site if there is drainage.  Once the wound has quit draining you may leave it open to air.  Generally you should leave the bandage intact for twenty four hours unless there is drainage.  If the epidural site drains for more than 36-48 hours please call the anesthesia department.    QUESTIONS?:  Please feel free to call your physician or the hospital  if you have any questions, and they will be happy to assist you.

## 2023-05-09 LAB
PATH REPORT.COMMENTS IMP SPEC: NORMAL
PATH REPORT.FINAL DX SPEC: NORMAL
PATH REPORT.GROSS SPEC: NORMAL
PATH REPORT.MICROSCOPIC SPEC OTHER STN: NORMAL
PATH REPORT.RELEVANT HX SPEC: NORMAL
PHOTO IMAGE: NORMAL

## 2023-05-10 NOTE — TELEPHONE ENCOUNTER
Attempt # 2  Outcome: Left Message   Comment: LM for patient to return call to clinic to schedule med review with PCP

## 2023-05-15 RX ORDER — ATORVASTATIN CALCIUM 40 MG/1
40 TABLET, FILM COATED ORAL DAILY
Qty: 30 TABLET | Refills: 0 | Status: SHIPPED | OUTPATIENT
Start: 2023-05-15 | End: 2024-08-21

## 2023-05-15 NOTE — TELEPHONE ENCOUNTER
Attempt # 3  Outcome: Letter sent - max attempts reached   Comment: sent letter advising due for med review

## 2023-05-26 ENCOUNTER — TRANSCRIBE ORDERS (OUTPATIENT)
Dept: OTHER | Age: 58
End: 2023-05-26

## 2023-05-26 DIAGNOSIS — N48.6 PEYRONIE'S DISEASE: Primary | ICD-10-CM

## 2023-06-14 DIAGNOSIS — M10.00 IDIOPATHIC GOUT, UNSPECIFIED CHRONICITY, UNSPECIFIED SITE: ICD-10-CM

## 2023-06-16 RX ORDER — ALLOPURINOL 300 MG/1
TABLET ORAL
Qty: 30 TABLET | Refills: 0 | Status: SHIPPED | OUTPATIENT
Start: 2023-06-16 | End: 2024-08-21

## 2023-06-16 NOTE — TELEPHONE ENCOUNTER
Due for follow up per note in chart. 30 days supply. Please schedule with provider or covering provider within 30 days. Also due for multiple labs.

## 2023-06-28 ENCOUNTER — OFFICE VISIT (OUTPATIENT)
Dept: UROLOGY | Facility: CLINIC | Age: 58
End: 2023-06-28
Payer: COMMERCIAL

## 2023-06-28 DIAGNOSIS — N48.6 PEYRONIE'S DISEASE: Primary | ICD-10-CM

## 2023-06-28 PROCEDURE — 99203 OFFICE O/P NEW LOW 30 MIN: CPT | Performed by: UROLOGY

## 2023-06-28 NOTE — LETTER
6/28/2023       RE: Mikey Jenkins  [address] [place] [address]     Dear Colleague,    Thank you for referring your patient, Mikey Jenkins, to the [facility]. Please see a copy of my visit note below.    I am seeing Mikey Jenkins in consultation for evaluation of Peyronie's disease.  Turner Henry referred.    HPI:  Mikey Jenkins is a 57 year old male with history of Peyronie's disease.    Onset: 18 months ago., started after knee surgery maybe started with a little discomfort prior to knee surgery.  Inciting trauma:  No.   Severity/Degree of curve 45 degree up curve at distal third.    Did 6 injections of collagenase clostridium histolyticum with Dr. Henry, no response.  ED: no   Dupuytren's contractures: No    Pain with erection: no  Degree of curve precludes intercourse:  He's not sure- has been avoiding intercourse.  Angle is stable.    He has noted an obvious diffuse loss of girth in the distal shaft.    PAST MEDICAL HX:  Past Medical History:   Diagnosis Date     Arthropathy 8/1/2013     Erectile dysfunction, unspecified erectile dysfunction type 6/28/2018     Gout, unspecified 12/3/2010     Hyperlipidemia, unspecified hyperlipidemia type 11/7/2016     Knee joint effusion 2/9/2009     Male hypogonadism 6/28/2018     Unspecified hearing loss 12/3/2010       PAST SURG HX:  Past Surgical History:   Procedure Laterality Date     circumcision       COLONOSCOPY N/A 5/4/2023    Procedure: COLONOSCOPY WITH POLYPECTOMY;  Surgeon: Han Britton MD;  Location: [facility]     HERNIA REPAIR      umbilical     inguinal hernia repair  01/01/1987     ORTHOPEDIC SURGERY      right ankle     vasectomy       ZZC ORAL SURGERY PROCEDURE Left     wisdom tooth scaped         FAMILY HX:  Family History   Problem Relation Age of Onset     C.A.D. Father      Dementia Father      Arthritis Brother         gout     Thyroid Disease  Brother        SOCIAL HX:  Social History     Tobacco Use     Smoking status: Never     Smokeless tobacco: Never   Substance Use Topics     Alcohol use: Yes     Comment: 4 beers, weekly     Drug use: No       MEDICATIONS:  Current Outpatient Medications   Medication Sig     allopurinol (ZYLOPRIM) 300 MG tablet TAKE 1 TABLET BY MOUTH EVERY DAY     atorvastatin (LIPITOR) 40 MG tablet Take 1 tablet (40 mg) by mouth daily , due for appointment     colchicine (COLCRYS) 0.6 MG tablet Take 2 tablets at the first sign of flare, take 1 additional tablet one hour later.     ibuprofen (ADVIL/MOTRIN) 800 MG tablet Take 1 tablet (800 mg) by mouth 3 times daily as needed for mild pain     indomethacin (INDOCIN) 25 MG capsule Take 1-2 capsules (25-50 mg) by mouth 3 times daily as needed (gout)     sildenafil (REVATIO) 20 MG tablet Take 3-5 (60-100mg) tablets by mouth 1 hour prior to sexual activity     sodium chloride (OCEAN) 0.65 % nasal spray Spray 2 sprays in nostril 2 times daily as needed (nose bleeds)     No current facility-administered medications for this visit.       ALLERGIES:  Patient has no known allergies.      GENERAL PHYSICAL EXAM:     There were no vitals taken for this visit.   Constitutional: No acute distress. Well nourished.   PSYCH: normal mood and affect.  NEURO: normal gait, no focal deficits.   EYES: anicteric, EOMI, PERR.  CARDIOPULMONARY: breathing non-labored, pulse regular rate/rhythm, no peripheral edema.  GI: Abdomen soft, non-tender, nondistended   MUSCULOSKELETAL: normal limb proportions, no muscle wasting, no contractures.  SKIN: Normal virilized hair distribution, no lesions, warts or rashes over genitalia, abdomen extremities or face.  HEME/LYMPH: no ecchymosis, no lymphadenopathy in groin.     EXAM:  Phallus circ'ed. dorsal septal plaque noted at the distal third of the shaft.  Testes ++, anodular, nontender.  Cord structures not remarkable.     Prostate exam: deferred        Imaging/labs:  Lab Results   Component Value Date    PSA 3.30 10/05/2021    PSA 1.55 03/07/2018    PSA 1.71 11/22/2016          ASSESSMENT:     Peyronie's disease with curvature that precludes safe/comfortable intercourse.  No pain, no significant ED.    Failed conservative mechanical therapy with traction device and collagenase clostridium histolyticum.      PLAN:  Peyronie's disease.  - discussed conservative management option  - discussed option of further mechanical therapy with Xiaflex injections  +/- traction device.  - discussed surgical therapies including penile plication     The risks of plication surgery were explained. I stressed to him that shortening is expected to occur.  The goal is functional- still may not be perfectly straight after the plication.  Discussed that de brigitte ED is about 5% risk with plication.  Other risks discussed include recurrence of curvature, pain from sutures, palpable sutures.  I anticipate it would be a fairly straightforward plication given the moderate degree of curvature and distal location of the curvature.  I discussed that girth is not easy to regain, this is secondary to circumferential narrowing of the corporal bodies.    Patient will think about surgical options, he will continue conservative options for the time being.  Surgical codes given so he can talk to insurance.  I'm happy to see him back in the future as needed.     Copied cc to Consulting provider Turner Henry        Thank-you,  Rniku Fitzpatrick MD     Urological Surgeon         Additional Coding Information:    Problems:  3 -- one acute uncomplicated illness or injury    Data Reviewed  N/A     Level of risk:  3 -- low risk (e.g., OTC medication or observation, minor surgery without risks)    Time spent:  22 minutes spent on the date of the encounter doing chart review, history and exam, documentation and further activities per the note                  Again, thank you for allowing me to  participate in the care of your patient.      Sincerely,    Rinku Fitzpatrick MD

## 2023-06-28 NOTE — NURSING NOTE
Mikey Jenkins's chief complaint for this visit includes:  Chief Complaint   Patient presents with     New Patient     [facility], appt per pt, refd by Turner Henry MD, advised pt to get recs faxed from [facility], office preferred.     PCP: Anai Le    Referring Provider:  Turner Henry MD  [facility]  [address] [address]    There were no vitals taken for this visit.  Data Unavailable      No Known Allergies      Do you need any medication refills at today's visit? No    [facility] Visit Facilitator- Surgical Specialties

## 2023-06-28 NOTE — PROGRESS NOTES
I am seeing Mikey Jenkins in consultation for evaluation of Peyronie's disease.  Turner Henry referred.    HPI:  Mikey Jenkins is a 57 year old male with history of Peyronie's disease.    Onset: 18 months ago., started after knee surgery maybe started with a little discomfort prior to knee surgery.  Inciting trauma:  No.   Severity/Degree of curve 45 degree up curve at distal third.    Did 6 injections of collagenase clostridium histolyticum with Dr. Henry, no response.  ED: no   Dupuytren's contractures: No    Pain with erection: no  Degree of curve precludes intercourse:  He's not sure- has been avoiding intercourse.  Angle is stable.    He has noted an obvious diffuse loss of girth in the distal shaft.    PAST MEDICAL HX:  Past Medical History:   Diagnosis Date     Arthropathy 8/1/2013     Erectile dysfunction, unspecified erectile dysfunction type 6/28/2018     Gout, unspecified 12/3/2010     Hyperlipidemia, unspecified hyperlipidemia type 11/7/2016     Knee joint effusion 2/9/2009     Male hypogonadism 6/28/2018     Unspecified hearing loss 12/3/2010       PAST SURG HX:  Past Surgical History:   Procedure Laterality Date     circumcision       COLONOSCOPY N/A 5/4/2023    Procedure: COLONOSCOPY WITH POLYPECTOMY;  Surgeon: Han Britton MD;  Location: [facility]     HERNIA REPAIR      umbilical     inguinal hernia repair  01/01/1987     ORTHOPEDIC SURGERY      right ankle     vasectomy       ZZC ORAL SURGERY PROCEDURE Left     wisdom tooth scaped         FAMILY HX:  Family History   Problem Relation Age of Onset     C.A.D. Father      Dementia Father      Arthritis Brother         gout     Thyroid Disease Brother        SOCIAL HX:  Social History     Tobacco Use     Smoking status: Never     Smokeless tobacco: Never   Substance Use Topics     Alcohol use: Yes     Comment: 4 beers, weekly     Drug use: No       MEDICATIONS:  Current Outpatient Medications   Medication Sig     allopurinol (ZYLOPRIM) 300  MG tablet TAKE 1 TABLET BY MOUTH EVERY DAY     atorvastatin (LIPITOR) 40 MG tablet Take 1 tablet (40 mg) by mouth daily , due for appointment     colchicine (COLCRYS) 0.6 MG tablet Take 2 tablets at the first sign of flare, take 1 additional tablet one hour later.     ibuprofen (ADVIL/MOTRIN) 800 MG tablet Take 1 tablet (800 mg) by mouth 3 times daily as needed for mild pain     indomethacin (INDOCIN) 25 MG capsule Take 1-2 capsules (25-50 mg) by mouth 3 times daily as needed (gout)     sildenafil (REVATIO) 20 MG tablet Take 3-5 (60-100mg) tablets by mouth 1 hour prior to sexual activity     sodium chloride (OCEAN) 0.65 % nasal spray Spray 2 sprays in nostril 2 times daily as needed (nose bleeds)     No current facility-administered medications for this visit.       ALLERGIES:  Patient has no known allergies.      GENERAL PHYSICAL EXAM:     There were no vitals taken for this visit.   Constitutional: No acute distress. Well nourished.   PSYCH: normal mood and affect.  NEURO: normal gait, no focal deficits.   EYES: anicteric, EOMI, PERR.  CARDIOPULMONARY: breathing non-labored, pulse regular rate/rhythm, no peripheral edema.  GI: Abdomen soft, non-tender, nondistended   MUSCULOSKELETAL: normal limb proportions, no muscle wasting, no contractures.  SKIN: Normal virilized hair distribution, no lesions, warts or rashes over genitalia, abdomen extremities or face.  HEME/LYMPH: no ecchymosis, no lymphadenopathy in groin.     EXAM:  Phallus circ'ed. dorsal septal plaque noted at the distal third of the shaft.  Testes ++, anodular, nontender.  Cord structures not remarkable.     Prostate exam: deferred       Imaging/labs:  Lab Results   Component Value Date    PSA 3.30 10/05/2021    PSA 1.55 03/07/2018    PSA 1.71 11/22/2016          ASSESSMENT:     Peyronie's disease with curvature that precludes safe/comfortable intercourse.  No pain, no significant ED.    Failed conservative mechanical therapy with traction device and  collagenase clostridium histolyticum.      PLAN:  Peyronie's disease.  - discussed conservative management option  - discussed option of further mechanical therapy with Xiaflex injections  +/- traction device.  - discussed surgical therapies including penile plication     The risks of plication surgery were explained. I stressed to him that shortening is expected to occur.  The goal is functional- still may not be perfectly straight after the plication.  Discussed that de brigitte ED is about 5% risk with plication.  Other risks discussed include recurrence of curvature, pain from sutures, palpable sutures.  I anticipate it would be a fairly straightforward plication given the moderate degree of curvature and distal location of the curvature.  I discussed that girth is not easy to regain, this is secondary to circumferential narrowing of the corporal bodies.    Patient will think about surgical options, he will continue conservative options for the time being.  Surgical codes given so he can talk to insurance.  I'm happy to see him back in the future as needed.     Copied cc to Consulting provider Turner Henry        Thank-you,  Rinku Fitzpatrick MD     Urological Surgeon         Additional Coding Information:    Problems:  3 -- one acute uncomplicated illness or injury    Data Reviewed  N/A     Level of risk:  3 -- low risk (e.g., OTC medication or observation, minor surgery without risks)    Time spent:  22 minutes spent on the date of the encounter doing chart review, history and exam, documentation and further activities per the note

## 2023-06-28 NOTE — LETTER
Coding for Erectile Dysfunction and Peyronie's disease:    Diagnosis codes  Peyronie's disease N48.6  Erectile dysfunction N52.9      Procedure Codes (CPT codes) possible  Penile plication 72346

## 2023-07-20 ENCOUNTER — TELEPHONE (OUTPATIENT)
Dept: UROLOGY | Facility: CLINIC | Age: 58
End: 2023-07-20
Payer: COMMERCIAL

## 2023-07-20 NOTE — TELEPHONE ENCOUNTER
Coding for Erectile Dysfunction and Peyronie's disease:     Diagnosis codes  1. Peyronie's disease N48.6  2. Erectile dysfunction N52.9        Procedure Codes (CPT codes) possible  1. Penile plication 93816

## 2023-07-20 NOTE — TELEPHONE ENCOUNTER
[facility] Call Center    Phone Message    May a detailed message be left on voicemail: yes     Reason for Call: Other: Pt states he would like to move forward with surgery for penile plication that was previously discussed. He is needing to have the codes sent to his insurance provider. Please reach out to pt to fernando. Thanks     Action Taken: Message routed to:  [facility] uro    Travel Screening: Not Applicable

## 2023-07-20 NOTE — TELEPHONE ENCOUNTER
Called patient and he stated that his insurance said they can not run coverage for him until appointment for surgery is scheduled. Routing to Dr. Fitzpatrick to place the orders for surgery. PA will need to be initiated for patient.       Elisabeth Rosa LPN on 7/20/2023 at 4:41 PM

## 2023-07-26 DIAGNOSIS — N48.6 PEYRONIE'S DISEASE: Primary | ICD-10-CM

## 2023-07-26 RX ORDER — CEFAZOLIN SODIUM 2 G/50ML
2 SOLUTION INTRAVENOUS
Status: CANCELLED | OUTPATIENT
Start: 2023-07-26

## 2023-07-26 RX ORDER — CEFAZOLIN SODIUM 2 G/50ML
2 SOLUTION INTRAVENOUS SEE ADMIN INSTRUCTIONS
Status: CANCELLED | OUTPATIENT
Start: 2023-07-26

## 2023-07-28 ENCOUNTER — TELEPHONE (OUTPATIENT)
Dept: UROLOGY | Facility: CLINIC | Age: 58
End: 2023-07-28
Payer: COMMERCIAL

## 2023-07-28 PROBLEM — N48.6 PEYRONIE'S DISEASE: Status: ACTIVE | Noted: 2023-07-26

## 2023-07-28 NOTE — TELEPHONE ENCOUNTER
Scheduled surgery for 10/31 in [place] with Dr. Fitzpatrick.    H&P with PCP within 30 days of the surgery date. Patient verbalized understanding H&P is needed.    Post-op scheduled for 11/15 with Dr. Fitzpatrick in .    Writer will mail surgery packet.    No further questions/concerns at this time.     Xochitl Ivory on 07/28/23 at 11:13 AM  Senior Perioperative Coordinator   Ph: [phone]

## 2023-08-01 ENCOUNTER — TELEPHONE (OUTPATIENT)
Dept: UROLOGY | Facility: CLINIC | Age: 58
End: 2023-08-01

## 2023-08-07 ENCOUNTER — TELEPHONE (OUTPATIENT)
Dept: UROLOGY | Facility: CLINIC | Age: 58
End: 2023-08-07
Payer: COMMERCIAL

## 2023-08-07 NOTE — TELEPHONE ENCOUNTER
Closing encounter. Conversation is in previous encounter.     Elisabeth Rosa LPN on 8/7/2023 at 9:55 AM

## 2023-08-07 NOTE — TELEPHONE ENCOUNTER
[facility] Call Center    Phone Message    May a detailed message be left on voicemail: yes     Reason for Call: Other: PT returning call from MARTÍN Barber. Please call pt       Action Taken: Message routed to:  Other: Uro    Travel Screening: Not Applicable

## 2023-10-17 NOTE — PROGRESS NOTES
[facility]  [address]  [address]  [place] [address]  Phone: [phone]  Primary Provider: Anai Le  Pre-op Performing Provider: ANAI LE      PREOPERATIVE EVALUATION:  Today's date: 10/19/2023    Mikey is a 57 year old male who presents for a preoperative evaluation.      Surgical Information:  Surgery/Procedure: Plication, Penis   Surgery Location: [facility]  Surgeon: Dr. Rinku Fitzpatrick  Surgery Date: 10/31/23  Time of Surgery: TBD  Where patient plans to recover: At home with family  Fax number for surgical facility: Note does not need to be faxed, will be available electronically in Epic.    Assessment & Plan     The proposed surgical procedure is considered INTERMEDIATE risk.      ICD-10-CM    1. Preop general physical exam  Z01.818 EKG 12-lead complete w/read - (Clinic Performed)     CBC with platelets     CBC with platelets      2. Peyronie's disease  N48.6       3. Erectile dysfunction, unspecified erectile dysfunction type  N52.9       4. Hyperlipidemia, unspecified hyperlipidemia type  E78.5       5. Insomnia, unspecified type  G47.00 traZODone (DESYREL) 50 MG tablet      6. TIERRA (obstructive sleep apnea)  G47.33 Adult Sleep Eval & Management  Referral      7. Chronic gout of multiple sites, unspecified cause  M1A.09X0 indomethacin (INDOCIN) 25 MG capsule      8. Need for vaccination  Z23 TDAP 7+ (ADACEL,BOOSTRIX)             Possible Sleep Apnea: monitor symptoms         - No identified additional risk factors other than previously addressed    Antiplatelet or Anticoagulation Medication Instructions:  Hold all ASA/NSAIDs/Vitamins/Supplements 7-10 days prior to procedure     Additional Medication Instructions:   - Statins: Continue taking on the day of surgery.    - SSRIs, SNRIs, TCAs, Antipsychotics: Continue without modification.     RECOMMENDATION:  APPROVAL GIVEN to proceed with proposed  procedure, without further diagnostic evaluation.      Subjective       HPI related to upcoming procedure: Peyronie's disease with erectile dysfunction          10/12/2023     9:23 AM   Preop Questions   1. Have you ever had a heart attack or stroke? No   2. Have you ever had surgery on your heart or blood vessels, such as a stent placement, a coronary artery bypass, or surgery on an artery in your head, neck, heart, or legs? No   3. Do you have chest pain with activity? No   4. Do you have a history of  heart failure? No   5. Do you currently have a cold, bronchitis or symptoms of other infection? YES - nasal drainage, now resolved   6. Do you have a cough, shortness of breath, or wheezing? YES - mild cough, now resolved   7. Do you or anyone in your family have previous history of blood clots? No   8. Do you or does anyone in your family have a serious bleeding problem such as prolonged bleeding following surgeries or cuts? No   9. Have you ever had problems with anemia or been told to take iron pills? No   10. Have you had any abnormal blood loss such as black, tarry or bloody stools? No   11. Have you ever had a blood transfusion? No   12. Are you willing to have a blood transfusion if it is medically needed before, during, or after your surgery? Yes   13. Have you or any of your relatives ever had problems with anesthesia? No   14. Do you have sleep apnea, excessive snoring or daytime drowsiness? YES - known sleep apnea, hard time wearing CPAP   14a. Do you have a CPAP machine? Yes   15. Do you have any artifical heart valves or other implanted medical devices like a pacemaker, defibrillator, or continuous glucose monitor? No   16. Do you have artificial joints? No   17. Are you allergic to latex? No       Health Care Directive:  Patient does not have a Health Care Directive or Living Will: Discussed advance care planning with patient; however, patient declined at this time.    Preoperative Review of :    reviewed - no record of controlled substances prescribed.      Status of Chronic Conditions:  HYPERLIPIDEMIA - Patient has a long history of significant Hyperlipidemia requiring medication for treatment with recent good control. Patient reports no problems or side effects with the medication.     Review of Systems  Constitutional, neuro, ENT, endocrine, pulmonary, cardiac, gastrointestinal, genitourinary, musculoskeletal, integument and psychiatric systems are negative, except as otherwise noted.    Patient Active Problem List    Diagnosis Date Noted    Peyronie's disease 07/26/2023     Priority: Medium    Erectile dysfunction, unspecified erectile dysfunction type 06/28/2018     Priority: Medium    Male hypogonadism 06/28/2018     Priority: Medium    Hyperlipidemia, unspecified hyperlipidemia type 11/07/2016     Priority: Medium    ACP (advance care planning) 05/10/2016     Priority: Medium     Advance Care Planning 5/10/2016: ACP Review of Chart / Resources Provided:  Reviewed chart for advance care plan.  Mikey Jenkins has no plan or code status on file. Discussed available resources and provided with information. Confirmed code status reflects current choices pending further ACP discussions.  Confirmed/documented legally designated decision makers.  Added by Susan Monae          Gout, chronic 09/09/2013     Priority: Medium    Arthropathy 08/01/2013     Priority: Medium    Hearing loss 12/03/2010     Priority: Medium    Knee joint effusion 02/09/2009     Priority: Medium      Past Medical History:   Diagnosis Date    Arthropathy 8/1/2013    Erectile dysfunction, unspecified erectile dysfunction type 6/28/2018    Gout, unspecified 12/3/2010    Hyperlipidemia, unspecified hyperlipidemia type 11/7/2016    Knee joint effusion 2/9/2009    Male hypogonadism 6/28/2018    Unspecified hearing loss 12/3/2010     Past Surgical History:   Procedure Laterality Date    circumcision      COLONOSCOPY N/A 05/04/2023     "Procedure: COLONOSCOPY WITH POLYPECTOMY;  Surgeon: Han Britton MD;  Location: [facility]    HERNIA REPAIR      umbilical    inguinal hernia repair  01/01/1987    KNEE ARTHROSCOPY W/ DEBRIDEMENT Left     ORTHOPEDIC SURGERY      right ankle    vasectomy      ZZC ORAL SURGERY PROCEDURE Left     wisdom tooth scaped      Current Outpatient Medications   Medication Sig Dispense Refill    allopurinol (ZYLOPRIM) 300 MG tablet TAKE 1 TABLET BY MOUTH EVERY DAY 30 tablet 0    atorvastatin (LIPITOR) 40 MG tablet Take 1 tablet (40 mg) by mouth daily , due for appointment 30 tablet 0    colchicine (COLCRYS) 0.6 MG tablet Take 2 tablets at the first sign of flare, take 1 additional tablet one hour later. 9 tablet 0    ibuprofen (ADVIL/MOTRIN) 800 MG tablet Take 1 tablet (800 mg) by mouth 3 times daily as needed for mild pain 100 tablet 1    indomethacin (INDOCIN) 25 MG capsule Take 1-2 capsules (25-50 mg) by mouth 3 times daily as needed (gout) 30 capsule 2    sildenafil (REVATIO) 20 MG tablet Take 3-5 (60-100mg) tablets by mouth 1 hour prior to sexual activity 100 tablet 11    sodium chloride (OCEAN) 0.65 % nasal spray Spray 2 sprays in nostril 2 times daily as needed (nose bleeds) 15 mL 0    traZODone (DESYREL) 50 MG tablet Take 1 tablet (50 mg) by mouth at bedtime 30 tablet 2       No Known Allergies     Social History     Tobacco Use    Smoking status: Never    Smokeless tobacco: Never   Substance Use Topics    Alcohol use: Yes     Comment: 4 beers, weekly     Family History   Problem Relation Age of Onset    C.A.D. Father     Dementia Father     Arthritis Brother         gout    Thyroid Disease Brother      History   Drug Use No         Objective     BP (!) 144/72 (BP Location: Right arm, Patient Position: Sitting, Cuff Size: Adult Regular)   Pulse 63   Temp (!) 96.6  F (35.9  C) (Tympanic)   Resp 16   Ht 1.803 m (5' 11\")   Wt 98.2 kg (216 lb 9.6 oz)   SpO2 98%   BMI 30.21 kg/m      Physical Exam    GENERAL " APPEARANCE: healthy, alert and no distress     EYES: EOMI,  PERRL     HENT: ear canals and TM's normal and nose and mouth without ulcers or lesions     NECK: no adenopathy     RESP: lungs clear to auscultation - no rales, rhonchi or wheezes     CV: regular rates and rhythm, normal S1 S2, no S3 or S4 and no murmur, click or rub     MS: extremities normal- no gross deformities noted, no evidence of inflammation in joints, FROM in all extremities.     SKIN: no suspicious lesions or rashes     NEURO: Normal strength and tone, sensory exam grossly normal, mentation intact and speech normal     PSYCH: mentation appears normal. and affect normal/bright    Recent Labs   Lab Test 03/14/22  1112   HGB 15.3         POTASSIUM 4.0   CR 1.08        Diagnostics:  Recent Results (from the past 168 hour(s))   EKG 12-lead complete w/read - (Clinic Performed)    Collection Time: 10/19/23 11:13 AM   Result Value Ref Range    Systolic Blood Pressure  mmHg    Diastolic Blood Pressure  mmHg    Ventricular Rate 60 BPM    Atrial Rate 60 BPM    PA Interval 138 ms    QRS Duration 92 ms     ms    QTc 430 ms    P Axis 60 degrees    R AXIS -9 degrees    T Axis 70 degrees    Interpretation ECG       Sinus rhythm  Nonspecific ST and T wave abnormality  Abnormal ECG  No previous ECGs available     CBC with platelets    Collection Time: 10/19/23 11:59 AM   Result Value Ref Range    WBC Count 5.8 4.0 - 11.0 10e3/uL    RBC Count 5.45 4.40 - 5.90 10e6/uL    Hemoglobin 16.1 13.3 - 17.7 g/dL    Hematocrit 47.6 40.0 - 53.0 %    MCV 87 78 - 100 fL    MCH 29.5 26.5 - 33.0 pg    MCHC 33.8 31.5 - 36.5 g/dL    RDW 12.8 10.0 - 15.0 %    Platelet Count 204 150 - 450 10e3/uL   Extra Green Top (Lithium Heparin) Tube    Collection Time: 10/19/23 11:59 AM   Result Value Ref Range    Hold Specimen JIC       EKG: appears normal, NSR, normal axis, normal intervals, no acute ST/T changes c/w ischemia, no LVH by voltage criteria, unchanged from  previous tracings    Revised Cardiac Risk Index (RCRI):  The patient has the following serious cardiovascular risks for perioperative complications:   - No serious cardiac risks = 0 points     RCRI Interpretation: 0 points: Class I (very low risk - 0.4% complication rate)         Signed Electronically by: Anai Le MD  Copy of this evaluation report is provided to requesting physician.

## 2023-10-17 NOTE — H&P (VIEW-ONLY)
[facility]  [address]  [address]  [place] [address]  Phone: [phone]  Primary Provider: Anai Le  Pre-op Performing Provider: ANAI LE      PREOPERATIVE EVALUATION:  Today's date: 10/19/2023    Mikey is a 57 year old male who presents for a preoperative evaluation.      Surgical Information:  Surgery/Procedure: Plication, Penis   Surgery Location: [facility]  Surgeon: Dr. Rinku Fitzpatrick  Surgery Date: 10/31/23  Time of Surgery: TBD  Where patient plans to recover: At home with family  Fax number for surgical facility: Note does not need to be faxed, will be available electronically in Epic.    Assessment & Plan     The proposed surgical procedure is considered INTERMEDIATE risk.      ICD-10-CM    1. Preop general physical exam  Z01.818 EKG 12-lead complete w/read - (Clinic Performed)     CBC with platelets     CBC with platelets      2. Peyronie's disease  N48.6       3. Erectile dysfunction, unspecified erectile dysfunction type  N52.9       4. Hyperlipidemia, unspecified hyperlipidemia type  E78.5       5. Insomnia, unspecified type  G47.00 traZODone (DESYREL) 50 MG tablet      6. TIERRA (obstructive sleep apnea)  G47.33 Adult Sleep Eval & Management  Referral      7. Chronic gout of multiple sites, unspecified cause  M1A.09X0 indomethacin (INDOCIN) 25 MG capsule      8. Need for vaccination  Z23 TDAP 7+ (ADACEL,BOOSTRIX)             Possible Sleep Apnea: monitor symptoms         - No identified additional risk factors other than previously addressed    Antiplatelet or Anticoagulation Medication Instructions:  Hold all ASA/NSAIDs/Vitamins/Supplements 7-10 days prior to procedure     Additional Medication Instructions:   - Statins: Continue taking on the day of surgery.    - SSRIs, SNRIs, TCAs, Antipsychotics: Continue without modification.     RECOMMENDATION:  APPROVAL GIVEN to proceed with proposed  procedure, without further diagnostic evaluation.      Subjective       HPI related to upcoming procedure: Peyronie's disease with erectile dysfunction          10/12/2023     9:23 AM   Preop Questions   1. Have you ever had a heart attack or stroke? No   2. Have you ever had surgery on your heart or blood vessels, such as a stent placement, a coronary artery bypass, or surgery on an artery in your head, neck, heart, or legs? No   3. Do you have chest pain with activity? No   4. Do you have a history of  heart failure? No   5. Do you currently have a cold, bronchitis or symptoms of other infection? YES - nasal drainage, now resolved   6. Do you have a cough, shortness of breath, or wheezing? YES - mild cough, now resolved   7. Do you or anyone in your family have previous history of blood clots? No   8. Do you or does anyone in your family have a serious bleeding problem such as prolonged bleeding following surgeries or cuts? No   9. Have you ever had problems with anemia or been told to take iron pills? No   10. Have you had any abnormal blood loss such as black, tarry or bloody stools? No   11. Have you ever had a blood transfusion? No   12. Are you willing to have a blood transfusion if it is medically needed before, during, or after your surgery? Yes   13. Have you or any of your relatives ever had problems with anesthesia? No   14. Do you have sleep apnea, excessive snoring or daytime drowsiness? YES - known sleep apnea, hard time wearing CPAP   14a. Do you have a CPAP machine? Yes   15. Do you have any artifical heart valves or other implanted medical devices like a pacemaker, defibrillator, or continuous glucose monitor? No   16. Do you have artificial joints? No   17. Are you allergic to latex? No       Health Care Directive:  Patient does not have a Health Care Directive or Living Will: Discussed advance care planning with patient; however, patient declined at this time.    Preoperative Review of :    reviewed - no record of controlled substances prescribed.      Status of Chronic Conditions:  HYPERLIPIDEMIA - Patient has a long history of significant Hyperlipidemia requiring medication for treatment with recent good control. Patient reports no problems or side effects with the medication.     Review of Systems  Constitutional, neuro, ENT, endocrine, pulmonary, cardiac, gastrointestinal, genitourinary, musculoskeletal, integument and psychiatric systems are negative, except as otherwise noted.    Patient Active Problem List    Diagnosis Date Noted    Peyronie's disease 07/26/2023     Priority: Medium    Erectile dysfunction, unspecified erectile dysfunction type 06/28/2018     Priority: Medium    Male hypogonadism 06/28/2018     Priority: Medium    Hyperlipidemia, unspecified hyperlipidemia type 11/07/2016     Priority: Medium    ACP (advance care planning) 05/10/2016     Priority: Medium     Advance Care Planning 5/10/2016: ACP Review of Chart / Resources Provided:  Reviewed chart for advance care plan.  Mikey Jenkins has no plan or code status on file. Discussed available resources and provided with information. Confirmed code status reflects current choices pending further ACP discussions.  Confirmed/documented legally designated decision makers.  Added by Susan Monae          Gout, chronic 09/09/2013     Priority: Medium    Arthropathy 08/01/2013     Priority: Medium    Hearing loss 12/03/2010     Priority: Medium    Knee joint effusion 02/09/2009     Priority: Medium      Past Medical History:   Diagnosis Date    Arthropathy 8/1/2013    Erectile dysfunction, unspecified erectile dysfunction type 6/28/2018    Gout, unspecified 12/3/2010    Hyperlipidemia, unspecified hyperlipidemia type 11/7/2016    Knee joint effusion 2/9/2009    Male hypogonadism 6/28/2018    Unspecified hearing loss 12/3/2010     Past Surgical History:   Procedure Laterality Date    circumcision      COLONOSCOPY N/A 05/04/2023     "Procedure: COLONOSCOPY WITH POLYPECTOMY;  Surgeon: Han Britton MD;  Location: [facility]    HERNIA REPAIR      umbilical    inguinal hernia repair  01/01/1987    KNEE ARTHROSCOPY W/ DEBRIDEMENT Left     ORTHOPEDIC SURGERY      right ankle    vasectomy      ZZC ORAL SURGERY PROCEDURE Left     wisdom tooth scaped      Current Outpatient Medications   Medication Sig Dispense Refill    allopurinol (ZYLOPRIM) 300 MG tablet TAKE 1 TABLET BY MOUTH EVERY DAY 30 tablet 0    atorvastatin (LIPITOR) 40 MG tablet Take 1 tablet (40 mg) by mouth daily , due for appointment 30 tablet 0    colchicine (COLCRYS) 0.6 MG tablet Take 2 tablets at the first sign of flare, take 1 additional tablet one hour later. 9 tablet 0    ibuprofen (ADVIL/MOTRIN) 800 MG tablet Take 1 tablet (800 mg) by mouth 3 times daily as needed for mild pain 100 tablet 1    indomethacin (INDOCIN) 25 MG capsule Take 1-2 capsules (25-50 mg) by mouth 3 times daily as needed (gout) 30 capsule 2    sildenafil (REVATIO) 20 MG tablet Take 3-5 (60-100mg) tablets by mouth 1 hour prior to sexual activity 100 tablet 11    sodium chloride (OCEAN) 0.65 % nasal spray Spray 2 sprays in nostril 2 times daily as needed (nose bleeds) 15 mL 0    traZODone (DESYREL) 50 MG tablet Take 1 tablet (50 mg) by mouth at bedtime 30 tablet 2       No Known Allergies     Social History     Tobacco Use    Smoking status: Never    Smokeless tobacco: Never   Substance Use Topics    Alcohol use: Yes     Comment: 4 beers, weekly     Family History   Problem Relation Age of Onset    C.A.D. Father     Dementia Father     Arthritis Brother         gout    Thyroid Disease Brother      History   Drug Use No         Objective     BP (!) 144/72 (BP Location: Right arm, Patient Position: Sitting, Cuff Size: Adult Regular)   Pulse 63   Temp (!) 96.6  F (35.9  C) (Tympanic)   Resp 16   Ht 1.803 m (5' 11\")   Wt 98.2 kg (216 lb 9.6 oz)   SpO2 98%   BMI 30.21 kg/m      Physical Exam    GENERAL " APPEARANCE: healthy, alert and no distress     EYES: EOMI,  PERRL     HENT: ear canals and TM's normal and nose and mouth without ulcers or lesions     NECK: no adenopathy     RESP: lungs clear to auscultation - no rales, rhonchi or wheezes     CV: regular rates and rhythm, normal S1 S2, no S3 or S4 and no murmur, click or rub     MS: extremities normal- no gross deformities noted, no evidence of inflammation in joints, FROM in all extremities.     SKIN: no suspicious lesions or rashes     NEURO: Normal strength and tone, sensory exam grossly normal, mentation intact and speech normal     PSYCH: mentation appears normal. and affect normal/bright    Recent Labs   Lab Test 03/14/22  1112   HGB 15.3         POTASSIUM 4.0   CR 1.08        Diagnostics:  Recent Results (from the past 168 hour(s))   EKG 12-lead complete w/read - (Clinic Performed)    Collection Time: 10/19/23 11:13 AM   Result Value Ref Range    Systolic Blood Pressure  mmHg    Diastolic Blood Pressure  mmHg    Ventricular Rate 60 BPM    Atrial Rate 60 BPM    ME Interval 138 ms    QRS Duration 92 ms     ms    QTc 430 ms    P Axis 60 degrees    R AXIS -9 degrees    T Axis 70 degrees    Interpretation ECG       Sinus rhythm  Nonspecific ST and T wave abnormality  Abnormal ECG  No previous ECGs available     CBC with platelets    Collection Time: 10/19/23 11:59 AM   Result Value Ref Range    WBC Count 5.8 4.0 - 11.0 10e3/uL    RBC Count 5.45 4.40 - 5.90 10e6/uL    Hemoglobin 16.1 13.3 - 17.7 g/dL    Hematocrit 47.6 40.0 - 53.0 %    MCV 87 78 - 100 fL    MCH 29.5 26.5 - 33.0 pg    MCHC 33.8 31.5 - 36.5 g/dL    RDW 12.8 10.0 - 15.0 %    Platelet Count 204 150 - 450 10e3/uL   Extra Green Top (Lithium Heparin) Tube    Collection Time: 10/19/23 11:59 AM   Result Value Ref Range    Hold Specimen JIC       EKG: appears normal, NSR, normal axis, normal intervals, no acute ST/T changes c/w ischemia, no LVH by voltage criteria, unchanged from  previous tracings    Revised Cardiac Risk Index (RCRI):  The patient has the following serious cardiovascular risks for perioperative complications:   - No serious cardiac risks = 0 points     RCRI Interpretation: 0 points: Class I (very low risk - 0.4% complication rate)         Signed Electronically by: Anai Le MD  Copy of this evaluation report is provided to requesting physician.

## 2023-10-19 ENCOUNTER — OFFICE VISIT (OUTPATIENT)
Dept: FAMILY MEDICINE | Facility: OTHER | Age: 58
End: 2023-10-19
Attending: FAMILY MEDICINE
Payer: COMMERCIAL

## 2023-10-19 VITALS
DIASTOLIC BLOOD PRESSURE: 72 MMHG | RESPIRATION RATE: 16 BRPM | OXYGEN SATURATION: 98 % | BODY MASS INDEX: 30.32 KG/M2 | HEART RATE: 63 BPM | WEIGHT: 216.6 LBS | HEIGHT: 71 IN | SYSTOLIC BLOOD PRESSURE: 144 MMHG | TEMPERATURE: 96.6 F

## 2023-10-19 DIAGNOSIS — M1A.09X0 CHRONIC GOUT OF MULTIPLE SITES, UNSPECIFIED CAUSE: ICD-10-CM

## 2023-10-19 DIAGNOSIS — N52.9 ERECTILE DYSFUNCTION, UNSPECIFIED ERECTILE DYSFUNCTION TYPE: ICD-10-CM

## 2023-10-19 DIAGNOSIS — G47.00 INSOMNIA, UNSPECIFIED TYPE: ICD-10-CM

## 2023-10-19 DIAGNOSIS — Z23 NEED FOR VACCINATION: ICD-10-CM

## 2023-10-19 DIAGNOSIS — E78.5 HYPERLIPIDEMIA, UNSPECIFIED HYPERLIPIDEMIA TYPE: ICD-10-CM

## 2023-10-19 DIAGNOSIS — G47.33 OSA (OBSTRUCTIVE SLEEP APNEA): ICD-10-CM

## 2023-10-19 DIAGNOSIS — Z01.818 PREOP GENERAL PHYSICAL EXAM: Primary | ICD-10-CM

## 2023-10-19 DIAGNOSIS — N48.6 PEYRONIE'S DISEASE: ICD-10-CM

## 2023-10-19 LAB
ERYTHROCYTE [DISTWIDTH] IN BLOOD BY AUTOMATED COUNT: 12.8 % (ref 10–15)
HCT VFR BLD AUTO: 47.6 % (ref 40–53)
HGB BLD-MCNC: 16.1 G/DL (ref 13.3–17.7)
HOLD SPECIMEN: NORMAL
MCH RBC QN AUTO: 29.5 PG (ref 26.5–33)
MCHC RBC AUTO-ENTMCNC: 33.8 G/DL (ref 31.5–36.5)
MCV RBC AUTO: 87 FL (ref 78–100)
PLATELET # BLD AUTO: 204 10E3/UL (ref 150–450)
RBC # BLD AUTO: 5.45 10E6/UL (ref 4.4–5.9)
WBC # BLD AUTO: 5.8 10E3/UL (ref 4–11)

## 2023-10-19 PROCEDURE — 90715 TDAP VACCINE 7 YRS/> IM: CPT | Performed by: FAMILY MEDICINE

## 2023-10-19 PROCEDURE — 90471 IMMUNIZATION ADMIN: CPT | Performed by: FAMILY MEDICINE

## 2023-10-19 PROCEDURE — 93000 ELECTROCARDIOGRAM COMPLETE: CPT | Mod: 77 | Performed by: INTERNAL MEDICINE

## 2023-10-19 PROCEDURE — 99214 OFFICE O/P EST MOD 30 MIN: CPT | Mod: 25 | Performed by: FAMILY MEDICINE

## 2023-10-19 PROCEDURE — 36415 COLL VENOUS BLD VENIPUNCTURE: CPT | Performed by: FAMILY MEDICINE

## 2023-10-19 PROCEDURE — 85027 COMPLETE CBC AUTOMATED: CPT | Performed by: FAMILY MEDICINE

## 2023-10-19 RX ORDER — INDOMETHACIN 25 MG/1
25-50 CAPSULE ORAL 3 TIMES DAILY PRN
Qty: 30 CAPSULE | Refills: 2 | Status: SHIPPED | OUTPATIENT
Start: 2023-10-19

## 2023-10-19 RX ORDER — TRAZODONE HYDROCHLORIDE 50 MG/1
50 TABLET, FILM COATED ORAL AT BEDTIME
Qty: 30 TABLET | Refills: 2 | Status: SHIPPED | OUTPATIENT
Start: 2023-10-19 | End: 2023-12-22

## 2023-10-19 ASSESSMENT — SLEEP AND FATIGUE QUESTIONNAIRES
HOW LIKELY ARE YOU TO NOD OFF OR FALL ASLEEP IN A CAR, WHILE STOPPED FOR A FEW MINUTES IN TRAFFIC: WOULD NEVER DOZE
HOW LIKELY ARE YOU TO NOD OFF OR FALL ASLEEP WHILE SITTING QUIETLY AFTER LUNCH WITHOUT ALCOHOL: SLIGHT CHANCE OF DOZING
HOW LIKELY ARE YOU TO NOD OFF OR FALL ASLEEP WHILE LYING DOWN TO REST IN THE AFTERNOON WHEN CIRCUMSTANCES PERMIT: HIGH CHANCE OF DOZING
HOW LIKELY ARE YOU TO NOD OFF OR FALL ASLEEP WHILE SITTING INACTIVE IN A PUBLIC PLACE: SLIGHT CHANCE OF DOZING
HOW LIKELY ARE YOU TO NOD OFF OR FALL ASLEEP WHILE WATCHING TV: HIGH CHANCE OF DOZING
HOW LIKELY ARE YOU TO NOD OFF OR FALL ASLEEP WHILE SITTING AND TALKING TO SOMEONE: WOULD NEVER DOZE
HOW LIKELY ARE YOU TO NOD OFF OR FALL ASLEEP WHILE SITTING AND READING: HIGH CHANCE OF DOZING
HOW LIKELY ARE YOU TO NOD OFF OR FALL ASLEEP WHEN YOU ARE A PASSENGER IN A CAR FOR AN HOUR WITHOUT A BREAK: SLIGHT CHANCE OF DOZING

## 2023-10-19 ASSESSMENT — PAIN SCALES - GENERAL: PAINLEVEL: NO PAIN (0)

## 2023-10-19 NOTE — Clinical Note
Patient states he has tried contacting the clinic with questions regarding time off work after procedure, etc.  He would appreciate a call.  Thank you

## 2023-10-20 ENCOUNTER — VIRTUAL VISIT (OUTPATIENT)
Dept: SLEEP MEDICINE | Facility: HOSPITAL | Age: 58
End: 2023-10-20
Attending: FAMILY MEDICINE
Payer: COMMERCIAL

## 2023-10-20 DIAGNOSIS — G47.33 OSA (OBSTRUCTIVE SLEEP APNEA): Primary | ICD-10-CM

## 2023-10-20 DIAGNOSIS — Z78.9 INTOLERANCE OF CONTINUOUS POSITIVE AIRWAY PRESSURE (CPAP) VENTILATION: ICD-10-CM

## 2023-10-20 NOTE — PROGRESS NOTES
10/19/23 1629   Reason For Your Visit   Please briefly describe the main reason(s) for your sleep visit Can not sleep more than 3 hours at a time wake up constantly can not wear CPAP machine take it off during sleep   Approximately when did this problem start About a year   What are your goals for this visit To find out if I need something better to get to sleep and stay asleep  can't think of new procedure so do not have to wear CPAP mask   Time in Bed - Work Or School Days   Do you work or go to school Yes   What time do you usually get into bed 11pm   About how long does it take you to fall asleep 30 minutes   How often do you have trouble falling asleep 5   How often do you wake up during the night Every night after a few hours  wake up for awhile   Put TV on fall asleep couple more hours back awake   Turn on tv sleep again and mic up again shortly after   Do you work days/evenings/nights/rotating shifts Days   What wakes you up at night Snorting self awake;Use the bathroom;Uncertain   How often do you have trouble falling back to sleep 5   About how long does it take to fall back to sleep 30 minutes   What do you usually do if you have trouble getting back to sleep Turn on TV   What time do you usually get out of bed to start your day 7:15am   Do you use an alarm Yes   Time in Bed - Weekends/Non-work Days/All Other Days   What time do you usually get into bed 11   About how long does it take you to fall asleep 30 minutes   What time do you usually get out of bed to start your day 9:00   Do you use an alarm No   Sleep Need   On average, about how much sleep do you think you get 5 hours   About how much sleep do you think you need 8-10 hours   Sleep Position   Which sleep positions do you prefer Back;Side   Do you do any of the following activities in bed Watch TV   How often do you take a nap on purpose 3-4   About how long are your naps 30 minutes   Do you feel better after naps Yes   How often do you doze  off unintentionally Not often - planned naps   Have you ever had a driving accident or near-miss due to sleepiness/drowsiness No   Sleep Disruptions - Breathing/Snoring   Do you snore Yes   Do other people complain about your snoring Yes   Have you been told you stop breathing in your sleep Yes   Do you have issues with any of the following Morning mouth dryness;Stuffy nose when you wake up   Sleep Disruptions - Movement   Do you get pain, discomfort, with an urge to move No   Does it happen when you are resting No   Does it get better if you move around No   Does it happen more at night No   Have you been told you kick your legs at night No   Sleep Disruptions - Behaviours in Sleep   Have you ever experienced any of the following during your sleep Recurring Nightmares   Do you ever experience sudden muscle weakness during the day No   4) Is there anything else you would like your sleep provider to know Feel sluggish during the day and light headed due to lack of good sleep   Try to work thtough it but mind is homa foggish and memory not as good   Caffeine, Alcohol and Other Substances   How many caffeinated beverages (coffee, tea, soda, energy drinks) per day 3   What time of day is your last caffeine use 7   List any prescribed or over the counter sleep medication you take Just starting 50g tonight   List previous sleep medications you have tried CPAP   Do you drink alcohol to help you sleep No   Do you drink alcohol near bedtime No   Family History   Has any family member been diagnosed with a sleep disorder Yes   If yes, please indicate Mom   Sleep apnea snoring   In the last TWO WEEKS have you experienced any of the following symptoms?   Fevers No   Night Sweats Yes   Weight Gain No   Pain at Night No   Double Vision No   Changes in Vision Yes   Difficulty Breathing through Nose Yes   Sore Throat in Morning Yes   Dry Mouth in the Morning Yes   Shortness of Breath Lying Flat No   Shortness of Breath With  Activity Yes   Awakening with Shortness of Breath No   Increased Cough Yes   Heart Racing at Night No   Swelling in Feet or Legs No   Diarrhea at Night No   Heartburn at Night No   Urinating More than Once at Night Yes   Losing Control of Urine at Night No   Joint Pains at Night No   Headaches in Morning Yes   Weakness in Arms or Legs No   Depressed Mood Yes   Anxiety Yes

## 2023-10-20 NOTE — PROGRESS NOTES
10/19/23 1611   Insomnia Severity Index- The ZAKI contact information and permission to use: [email or web address], Aguilar, Estefania. Internet: [email or web address]   Difficulty falling asleep 2   Difficulty staying asleep 4   Problems waking up too early 4   How SATISFIED/DISSATISFIED are you with your CURRENT sleep pattern? 4   How NOTICEABLE to others do you think your sleep problem is in terms of impairing the quality of your life? 4   How WORRIED/DISTRESSED are you about your current sleep problem? 4   To what extent do you consider your sleep problem to INTERFERE with your daily functioning (e.g. daytime fatigue, mood, ability to function at work/daily chores, concentration, memory, mood, etc.) CURRENTLY? 4   ZAKI Total Score 26

## 2023-10-20 NOTE — PROGRESS NOTES
10/19/23 1631   STOP-BANG Vineet F, Radhames B, Miley P, Vineet SA, Iva S, Janes S, Rosana A, Joseph CM. Anesthesiology. 2008 May;108(5):812-21. Copyright   2008, the American Society of Anesthesiologists, Inc. [employer]   Do you SNORE loudly (louder than talking or loud enough to be heard through closed doors)? 1   Do you often feel TIRED, fatigued, or sleepy during daytime? 1   Has anyone OBSERVED you stop breathing during your sleep? 1   Do you have or are you being treated for high blood PRESSURE? 1   BMI more than 35kg/m2? 0   AGE over 50 years old? 1   NECK circumference > 16 inches (40cm)? 0   GENDER: Male? 1

## 2023-10-20 NOTE — PROGRESS NOTES
10/19/23 1631   [place] Sleepiness Scale   Sitting and reading 3   Watching TV 3   Sitting, inactive in a public place (e.g. a theatre or a meeting) 1   As a passenger in a car for an hour without a break 1   Lying down to rest in the afternoon when circumstances permit 3   Sitting and talking to someone 0   Sitting quietly after a lunch without alcohol 1   In a car, while stopped for a few minutes in traffic 0   Total score - [place] 12

## 2023-10-23 ENCOUNTER — MYC MEDICAL ADVICE (OUTPATIENT)
Dept: UROLOGY | Facility: CLINIC | Age: 58
End: 2023-10-23
Payer: COMMERCIAL

## 2023-10-23 NOTE — TELEPHONE ENCOUNTER
Called patient to review the information in the mychart. Patient thanked staff for the call.     Elisabeth Rosa LPN on 10/23/2023 at 4:29 PM

## 2023-10-23 NOTE — PROGRESS NOTES
"Chart review prior to sleep testing.    Patient Summary:  57 year old male who is referred for history of TIERRA and CPAP intolerance.    Patient Active Problem List    Diagnosis Date Noted    Peyronie's disease 2023     Priority: Medium    Erectile dysfunction, unspecified erectile dysfunction type 2018     Priority: Medium    Male hypogonadism 2018     Priority: Medium    Hyperlipidemia, unspecified hyperlipidemia type 2016     Priority: Medium    ACP (advance care planning) 05/10/2016     Priority: Medium     Advance Care Planning 5/10/2016: ACP Review of Chart / Resources Provided:  Reviewed chart for advance care plan.  Mikey CANSECO Deloriss has no plan or code status on file. Discussed available resources and provided with information. Confirmed code status reflects current choices pending further ACP discussions.  Confirmed/documented legally designated decision makers.  Added by Susan Monae          Gout, chronic 2013     Priority: Medium    Arthropathy 2013     Priority: Medium    Hearing loss 2010     Priority: Medium    Knee joint effusion 2009     Priority: Medium       Current Outpatient Medications   Medication    allopurinol (ZYLOPRIM) 300 MG tablet    atorvastatin (LIPITOR) 40 MG tablet    colchicine (COLCRYS) 0.6 MG tablet    ibuprofen (ADVIL/MOTRIN) 800 MG tablet    indomethacin (INDOCIN) 25 MG capsule    sildenafil (REVATIO) 20 MG tablet    sodium chloride (OCEAN) 0.65 % nasal spray    traZODone (DESYREL) 50 MG tablet     No current facility-administered medications for this visit.       Pertinent PMHx of HLD, TIERRA.    Prior Sleep Testin2017 - PSG with undocumented weight.  AHI 20.3.  CPAP at 5-7 cm H2O appeared effective.    STOP-BANG score of 6, with unknown neck circumference.  De Witt score of 12.  ZAKI: 26    BMI of Estimated body mass index is 30.21 kg/m  as calculated from the following:    Height as of 10/19/23: 1.803 m (5' 11\").    Weight " "as of 10/19/23: 98.2 kg (216 lb 9.6 oz).     Per questionnaire: \"Can not sleep more than 3 hours at a time wake up constantly can not wear CPAP machine take it off during sleep \"    Goals = To find out if I need something better to get to sleep and stay asleep  can't think of new procedure so do not have to wear CPAP mask     Caffeine use:  Yes for 3+ per day.  Yes for within 6 hours of bed.    Sleep pattern:  Workdays.  11pm - 7:15am.  Weekends.  11pm - 9am.  Time to fall asleep: ~30 minutes.  Awakenings: 5 times per night, 30 minutes to return to sleep.  Note of consistent TV watching during awakenings.  Average total sleep time:  5 hours  Napping.  3-4 days per week, 0.5 hours per nap.    No for RLS screen.  No for sleep walking.  No for dream enactment behavior.  No for bruxism.    Yes for morning headaches.  Yes for snoring.  Yes for observed apnea.  Yes for FHx of TIERRA.    A/P:    1.)  History of moderate TIERRA  2.)  Reported CPAP intolerance    Given potential consideration for non-CPAP options, including upper airway stimulation, recommend diagnostic in-lab PSG.  Orders placed today.    ---  This note was written with the assistance of the Dragon voice-dictation technology software. The final document, although reviewed, may contain errors. For corrections, please contact the office.    Mitchel oCllins MD    Sleep Medicine  [place], [place]  Main Office: [phone]  [facility] [place]  [address] [place] [address], [address]  Schedule visits: [phone]  Main Office: [phone]  Fax: [phone]    "

## 2023-10-23 NOTE — CONFIDENTIAL NOTE
The preop nursing dept should call him to discuss the time to show up for surgery.     As far as activity, I usually say light activity ( lift/push/pull <20lbs) for the first 2 weeks.   No sexual activity for the first month at least.     Received the above message from Dr. Fitzpatrick. My chart message sent to patient.     Mila Mahajan RN, BSN

## 2023-10-25 ENCOUNTER — TELEPHONE (OUTPATIENT)
Dept: PULMONOLOGY | Facility: OTHER | Age: 58
End: 2023-10-25

## 2023-10-26 LAB
ATRIAL RATE - MUSE: 60 BPM
DIASTOLIC BLOOD PRESSURE - MUSE: NORMAL MMHG
INTERPRETATION ECG - MUSE: NORMAL
P AXIS - MUSE: 60 DEGREES
PR INTERVAL - MUSE: 138 MS
QRS DURATION - MUSE: 92 MS
QT - MUSE: 430 MS
QTC - MUSE: 430 MS
R AXIS - MUSE: -9 DEGREES
SYSTOLIC BLOOD PRESSURE - MUSE: NORMAL MMHG
T AXIS - MUSE: 70 DEGREES
VENTRICULAR RATE- MUSE: 60 BPM

## 2023-10-30 ENCOUNTER — ANESTHESIA EVENT (OUTPATIENT)
Dept: SURGERY | Facility: AMBULATORY SURGERY CENTER | Age: 58
End: 2023-10-30
Payer: COMMERCIAL

## 2023-10-30 ENCOUNTER — THERAPY VISIT (OUTPATIENT)
Dept: SLEEP MEDICINE | Facility: HOSPITAL | Age: 58
End: 2023-10-30
Attending: FAMILY MEDICINE
Payer: COMMERCIAL

## 2023-10-30 DIAGNOSIS — Z78.9 INTOLERANCE OF CONTINUOUS POSITIVE AIRWAY PRESSURE (CPAP) VENTILATION: ICD-10-CM

## 2023-10-30 DIAGNOSIS — G47.33 OSA (OBSTRUCTIVE SLEEP APNEA): ICD-10-CM

## 2023-10-30 PROCEDURE — 95810 POLYSOM 6/> YRS 4/> PARAM: CPT | Mod: 26 | Performed by: FAMILY MEDICINE

## 2023-10-30 PROCEDURE — 95810 POLYSOM 6/> YRS 4/> PARAM: CPT

## 2023-10-31 ENCOUNTER — ANESTHESIA (OUTPATIENT)
Dept: SURGERY | Facility: AMBULATORY SURGERY CENTER | Age: 58
End: 2023-10-31
Payer: COMMERCIAL

## 2023-10-31 ENCOUNTER — HOSPITAL ENCOUNTER (OUTPATIENT)
Facility: AMBULATORY SURGERY CENTER | Age: 58
Discharge: HOME | End: 2023-10-31
Attending: UROLOGY
Payer: COMMERCIAL

## 2023-10-31 VITALS
RESPIRATION RATE: 14 BRPM | WEIGHT: 210 LBS | TEMPERATURE: 98 F | BODY MASS INDEX: 29.4 KG/M2 | HEIGHT: 71 IN | SYSTOLIC BLOOD PRESSURE: 117 MMHG | OXYGEN SATURATION: 98 % | DIASTOLIC BLOOD PRESSURE: 82 MMHG | HEART RATE: 70 BPM

## 2023-10-31 DIAGNOSIS — N48.6 PEYRONIE'S DISEASE: Primary | ICD-10-CM

## 2023-10-31 PROCEDURE — 54360 PENIS PLASTIC SURGERY: CPT

## 2023-10-31 RX ORDER — GLYCOPYRROLATE 0.2 MG/ML
INJECTION, SOLUTION INTRAMUSCULAR; INTRAVENOUS PRN
Status: DISCONTINUED | OUTPATIENT
Start: 2023-10-31 | End: 2023-10-31

## 2023-10-31 RX ORDER — ONDANSETRON 2 MG/ML
4 INJECTION INTRAMUSCULAR; INTRAVENOUS EVERY 30 MIN PRN
Status: DISCONTINUED | OUTPATIENT
Start: 2023-10-31 | End: 2023-11-01 | Stop reason: HOSPADM

## 2023-10-31 RX ORDER — FENTANYL CITRATE 50 UG/ML
50 INJECTION, SOLUTION INTRAMUSCULAR; INTRAVENOUS EVERY 5 MIN PRN
Status: DISCONTINUED | OUTPATIENT
Start: 2023-10-31 | End: 2023-10-31 | Stop reason: HOSPADM

## 2023-10-31 RX ORDER — ONDANSETRON 2 MG/ML
4 INJECTION INTRAMUSCULAR; INTRAVENOUS EVERY 30 MIN PRN
Status: DISCONTINUED | OUTPATIENT
Start: 2023-10-31 | End: 2023-10-31 | Stop reason: HOSPADM

## 2023-10-31 RX ORDER — BUPIVACAINE HYDROCHLORIDE 5 MG/ML
INJECTION, SOLUTION EPIDURAL; INTRACAUDAL PRN
Status: DISCONTINUED | OUTPATIENT
Start: 2023-10-31 | End: 2023-10-31 | Stop reason: HOSPADM

## 2023-10-31 RX ORDER — LIDOCAINE 40 MG/G
CREAM TOPICAL
Status: DISCONTINUED | OUTPATIENT
Start: 2023-10-31 | End: 2023-10-31 | Stop reason: HOSPADM

## 2023-10-31 RX ORDER — CEFAZOLIN SODIUM 2 G/50ML
2 SOLUTION INTRAVENOUS
Status: COMPLETED | OUTPATIENT
Start: 2023-10-31 | End: 2023-10-31

## 2023-10-31 RX ORDER — ONDANSETRON 2 MG/ML
INJECTION INTRAMUSCULAR; INTRAVENOUS PRN
Status: DISCONTINUED | OUTPATIENT
Start: 2023-10-31 | End: 2023-10-31

## 2023-10-31 RX ORDER — HYDROMORPHONE HYDROCHLORIDE 1 MG/ML
0.2 INJECTION, SOLUTION INTRAMUSCULAR; INTRAVENOUS; SUBCUTANEOUS EVERY 5 MIN PRN
Status: DISCONTINUED | OUTPATIENT
Start: 2023-10-31 | End: 2023-10-31 | Stop reason: HOSPADM

## 2023-10-31 RX ORDER — PROPOFOL 10 MG/ML
INJECTION, EMULSION INTRAVENOUS PRN
Status: DISCONTINUED | OUTPATIENT
Start: 2023-10-31 | End: 2023-10-31

## 2023-10-31 RX ORDER — PROPOFOL 10 MG/ML
INJECTION, EMULSION INTRAVENOUS CONTINUOUS PRN
Status: DISCONTINUED | OUTPATIENT
Start: 2023-10-31 | End: 2023-10-31

## 2023-10-31 RX ORDER — OXYCODONE HYDROCHLORIDE 5 MG/1
10 TABLET ORAL
Status: DISCONTINUED | OUTPATIENT
Start: 2023-10-31 | End: 2023-11-01 | Stop reason: HOSPADM

## 2023-10-31 RX ORDER — HYDROMORPHONE HYDROCHLORIDE 1 MG/ML
0.4 INJECTION, SOLUTION INTRAMUSCULAR; INTRAVENOUS; SUBCUTANEOUS EVERY 5 MIN PRN
Status: DISCONTINUED | OUTPATIENT
Start: 2023-10-31 | End: 2023-10-31 | Stop reason: HOSPADM

## 2023-10-31 RX ORDER — SODIUM CHLORIDE, SODIUM LACTATE, POTASSIUM CHLORIDE, CALCIUM CHLORIDE 600; 310; 30; 20 MG/100ML; MG/100ML; MG/100ML; MG/100ML
INJECTION, SOLUTION INTRAVENOUS CONTINUOUS
Status: DISCONTINUED | OUTPATIENT
Start: 2023-10-31 | End: 2023-10-31 | Stop reason: HOSPADM

## 2023-10-31 RX ORDER — FENTANYL CITRATE 50 UG/ML
25 INJECTION, SOLUTION INTRAMUSCULAR; INTRAVENOUS EVERY 5 MIN PRN
Status: DISCONTINUED | OUTPATIENT
Start: 2023-10-31 | End: 2023-10-31 | Stop reason: HOSPADM

## 2023-10-31 RX ORDER — ACETAMINOPHEN 325 MG/1
975 TABLET ORAL ONCE
Status: COMPLETED | OUTPATIENT
Start: 2023-10-31 | End: 2023-10-31

## 2023-10-31 RX ORDER — OXYCODONE HYDROCHLORIDE 5 MG/1
5 TABLET ORAL
Status: DISCONTINUED | OUTPATIENT
Start: 2023-10-31 | End: 2023-11-01 | Stop reason: HOSPADM

## 2023-10-31 RX ORDER — DEXAMETHASONE SODIUM PHOSPHATE 4 MG/ML
INJECTION, SOLUTION INTRA-ARTICULAR; INTRALESIONAL; INTRAMUSCULAR; INTRAVENOUS; SOFT TISSUE PRN
Status: DISCONTINUED | OUTPATIENT
Start: 2023-10-31 | End: 2023-10-31

## 2023-10-31 RX ORDER — FENTANYL CITRATE 50 UG/ML
INJECTION, SOLUTION INTRAMUSCULAR; INTRAVENOUS PRN
Status: DISCONTINUED | OUTPATIENT
Start: 2023-10-31 | End: 2023-10-31

## 2023-10-31 RX ORDER — CEFAZOLIN SODIUM 2 G/50ML
2 SOLUTION INTRAVENOUS SEE ADMIN INSTRUCTIONS
Status: DISCONTINUED | OUTPATIENT
Start: 2023-10-31 | End: 2023-10-31 | Stop reason: HOSPADM

## 2023-10-31 RX ORDER — ONDANSETRON 4 MG/1
4 TABLET, ORALLY DISINTEGRATING ORAL EVERY 30 MIN PRN
Status: DISCONTINUED | OUTPATIENT
Start: 2023-10-31 | End: 2023-10-31 | Stop reason: HOSPADM

## 2023-10-31 RX ORDER — ONDANSETRON 4 MG/1
4 TABLET, ORALLY DISINTEGRATING ORAL EVERY 30 MIN PRN
Status: DISCONTINUED | OUTPATIENT
Start: 2023-10-31 | End: 2023-11-01 | Stop reason: HOSPADM

## 2023-10-31 RX ORDER — OXYCODONE HYDROCHLORIDE 5 MG/1
5 TABLET ORAL EVERY 6 HOURS PRN
Qty: 12 TABLET | Refills: 0 | Status: SHIPPED | OUTPATIENT
Start: 2023-10-31 | End: 2023-11-03

## 2023-10-31 RX ORDER — LIDOCAINE HYDROCHLORIDE 20 MG/ML
INJECTION, SOLUTION INFILTRATION; PERINEURAL PRN
Status: DISCONTINUED | OUTPATIENT
Start: 2023-10-31 | End: 2023-10-31

## 2023-10-31 RX ADMIN — PROPOFOL 200 MCG/KG/MIN: 10 INJECTION, EMULSION INTRAVENOUS at 11:51

## 2023-10-31 RX ADMIN — PROPOFOL 20 MG: 10 INJECTION, EMULSION INTRAVENOUS at 11:51

## 2023-10-31 RX ADMIN — LIDOCAINE HYDROCHLORIDE 100 MG: 20 INJECTION, SOLUTION INFILTRATION; PERINEURAL at 11:49

## 2023-10-31 RX ADMIN — FENTANYL CITRATE 25 MCG: 50 INJECTION, SOLUTION INTRAMUSCULAR; INTRAVENOUS at 12:39

## 2023-10-31 RX ADMIN — GLYCOPYRROLATE 0.2 MG: 0.2 INJECTION, SOLUTION INTRAMUSCULAR; INTRAVENOUS at 11:41

## 2023-10-31 RX ADMIN — PROPOFOL 200 MG: 10 INJECTION, EMULSION INTRAVENOUS at 11:49

## 2023-10-31 RX ADMIN — CEFAZOLIN SODIUM 2 G: 2 SOLUTION INTRAVENOUS at 11:53

## 2023-10-31 RX ADMIN — ACETAMINOPHEN 975 MG: 325 TABLET ORAL at 10:06

## 2023-10-31 RX ADMIN — SODIUM CHLORIDE, SODIUM LACTATE, POTASSIUM CHLORIDE, CALCIUM CHLORIDE: 600; 310; 30; 20 INJECTION, SOLUTION INTRAVENOUS at 10:07

## 2023-10-31 RX ADMIN — ONDANSETRON 4 MG: 2 INJECTION INTRAMUSCULAR; INTRAVENOUS at 12:39

## 2023-10-31 RX ADMIN — DEXAMETHASONE SODIUM PHOSPHATE 4 MG: 4 INJECTION, SOLUTION INTRA-ARTICULAR; INTRALESIONAL; INTRAMUSCULAR; INTRAVENOUS; SOFT TISSUE at 11:49

## 2023-10-31 ASSESSMENT — ENCOUNTER SYMPTOMS
DIARRHEA: 0
ARTHRALGIAS: 0
PARESTHESIAS: 0
HEARTBURN: 0
DYSURIA: 0
DIZZINESS: 0
COUGH: 0
HEADACHES: 0
WEAKNESS: 0
FEVER: 0
EYE PAIN: 0
CONSTIPATION: 0
HEMATURIA: 0
FREQUENCY: 0
SORE THROAT: 0
JOINT SWELLING: 0
SHORTNESS OF BREATH: 0
MYALGIAS: 0
NAUSEA: 0
HEMATOCHEZIA: 0
CHILLS: 0
ABDOMINAL PAIN: 0
NERVOUS/ANXIOUS: 0
PALPITATIONS: 0

## 2023-10-31 ASSESSMENT — COPD QUESTIONNAIRES: COPD: 0

## 2023-10-31 NOTE — OR NURSING
[facility] OR [address]  5TH FLOOR  [facility] [address] [phone]    10/31/2023    Mikey Jenkins  [address] [address] [phone] (home)     :  1965    To Whom it May Concern:    Mikey CANSECO Deloriss missed work on 10/31/2023 due to a procedure. He may return to work on Monday,  with the following restrictions:  Activity   - No strenuous exercise for 3 weeks.   - No lifting, pushing, pulling more than 10 pounds for 3 weeks.     Please contact me for any questions or concerns.    Sincerely,      Dr. Fitzpatrick

## 2023-10-31 NOTE — ANESTHESIA POSTPROCEDURE EVALUATION
Patient: Mikey Jenkins    Procedure: Procedure(s):  PLICATION, PENIS       Anesthesia Type:  General    Note:  Disposition: Outpatient   Postop Pain Control: Uneventful            Sign Out: Well controlled pain   PONV: No   Neuro/Psych: Uneventful            Sign Out: Acceptable/Baseline neuro status   Airway/Respiratory: Uneventful            Sign Out: Acceptable/Baseline resp. status   CV/Hemodynamics: Uneventful            Sign Out: Acceptable CV status; No obvious hypovolemia; No obvious fluid overload   Other NRE: NONE   DID A NON-ROUTINE EVENT OCCUR?            Last vitals:  Vitals Value Taken Time   /90 10/31/23 1325   Temp 36.6  C (97.8  F) 10/31/23 1325   Pulse 73 10/31/23 1325   Resp 5 10/31/23 1325   SpO2 95 % 10/31/23 1325   Vitals shown include unfiled device data.    Electronically Signed By: Franklin Monteiro MD  October 31, 2023  3:43 PM

## 2023-10-31 NOTE — ANESTHESIA CARE TRANSFER NOTE
Patient: Mikey Jenkins    Procedure: Procedure(s):  PLICATION, PENIS       Diagnosis: Peyronie's disease [N48.6]  Diagnosis Additional Information: No value filed.    Anesthesia Type:   General     Note:    Oropharynx: oropharynx clear of all foreign objects and spontaneously breathing  Level of Consciousness: awake  Oxygen Supplementation: face mask  Level of Supplemental Oxygen (L/min / FiO2): 6  Independent Airway: airway patency satisfactory and stable  Dentition: dentition unchanged  Vital Signs Stable: post-procedure vital signs reviewed and stable  Report to RN Given: handoff report given  Patient transferred to: PACU    Handoff Report: Identifed the Patient, Identified the Reponsible Provider, Reviewed the pertinent medical history, Discussed the surgical course, Reviewed Intra-OP anesthesia mangement and issues during anesthesia, Set expectations for post-procedure period and Allowed opportunity for questions and acknowledgement of understanding    Vitals:  Vitals Value Taken Time   BP     Temp     Pulse 69 10/31/23 1315   Resp 9 10/31/23 1315   SpO2 99 % 10/31/23 1315   Vitals shown include unfiled device data.    Electronically Signed By: OSIRIS Vallejo CRNA  October 31, 2023  1:16 PM

## 2023-10-31 NOTE — PROGRESS NOTES
Patient is here with a history of TIERRA and CPAP intolerance. All stages of sleep were noted with an efficiency of 92.7. there was mild to moderate snoring and respiratory events with an index of  37 and low SPO2 of 70.8 and 34.6 minutes below 89%. There was no leg movements and NSR. Patient tolerated test well.

## 2023-10-31 NOTE — DISCHARGE INSTRUCTIONS
"[facility]  Home Care Following Anesthesia  For 24 hours after surgery:  Get plenty of rest.  A responsible adult must stay with you for at least 24 hours after you leave the surgery center.  Do not drive or use heavy equipment.  If you have weakness or tingling, don't drive or use heavy equipment until this feeling goes away.   Do not drink alcohol.   Avoid strenuous or risky activities.  Ask for help when climbing stairs.  You may feel lightheaded.  IF so, sit for a few minutes before standing.  Have someone help you get up.   If you have nausea (feel sick to your stomach): Drink only clear liquids such as apple juice, ginger ale, broth or 7-Up.  Rest may also help.  Be sure to drink enough fluids.  Move to a regular diet as you feel able.   You may have a slight fever.  Call the doctor if your fever is over 100 F (37.7 C) (taken under the tongue) or lasts longer than 24 hours.  You may have a dry mouth, a sore throat, muscle aches or trouble sleeping. These should go away after 24 hours.  Do not make important or legal decisions.   It is recommended to avoid smoking.        Today you received a Marcaine or bupivacaine block to numb the nerves near your surgery site.  This is a block using local anesthetic or \"numbing\" medication injected around the nerves to anesthetize or \"numb\" the area supplied by those nerves.  This block is injected into the muscle layer near your surgical site.  The medication may numb the location where you had surgery for 6-18 hours, but may last up to 24 hours.  If your surgical site is an arm or leg you should be careful with your affected limb, since it is possible to injure your limb without being aware of it due to the numbing.  Until full feeling returns, you should guard against bumping or hitting your limb, and avoid extreme hot or cold temperatures on the skin.  As the block wears off, the feeling will return as a tingling or prickly " sensation near your surgical site.  You will experience more discomfort from your incision as the feeling returns.  You may want to take a pain pill (a narcotic or Tylenol if this was prescribed by your surgeon) when you start to experience mild pain before the pain beccomes more severe.  If your pain medications do not control your pain you should notifiy your surgeon.    Tips for taking pain medications  To get the best pain relief possible, remember these points:  Take pain medications as directed, before pain becomes severe.  Pain medication can upset your stomach: taking it with food may help.  Constipation is a common side effect of pain medication. Drink plenty of  fluids.  Eat foods high in fiber. Take a stool softener if recommended by your doctor or pharmacist.  Do not drink alcohol, drive or operate machinery while taking pain medications.  Ask about other ways to control pain, such as with heat, ice or relaxation.    Tylenol/Acetaminophen Consumption    If you feel your pain relief is insufficient, you may take Tylenol/Acetaminophen in addition to your narcotic pain medication.   Be careful not to exceed 4,000 mg of Tylenol/Acetaminophen in a 24 hour period from all sources.  If you are taking extra strength Tylenol/acetaminophen (500 mg), the maximum dose is 8 tablets in 24 hours.  If you are taking regular strength acetaminophen (325 mg), the maximum dose is 12 tablets in 24 hours.    Call a doctor for any of the following:  Signs of infection (fever, growing tenderness at the surgery site, a large amount of drainage or bleeding, severe pain, foul-smelling drainage, redness, swelling).  It has been over 8 to 10 hours since surgery and you are still not able to urinate (pass water).  Headache for over 24 hours.  Numbness, tingling or weakness the day after surgery (if you had spinal anesthesia).  Signs of Covid-19 infection (temperature over 100 degrees, shortness of breath, cough, loss of taste/smell,  generalized body aches, persistent headache, chills, sore throat, nausea/vomiting/diarrhea)  Your doctor is:  Dr. Rinku Fitzpatrick, Prostate and Urology: [phone]                    Or dial [phone] and ask for the resident on call for:  Prostate Urology  For emergency care, call the:  [facility]:  [phone] (TTY for hearing impaired: [phone])

## 2023-11-01 ENCOUNTER — TELEPHONE (OUTPATIENT)
Dept: UROLOGY | Facility: CLINIC | Age: 58
End: 2023-11-01
Payer: COMMERCIAL

## 2023-11-01 NOTE — OP NOTE
PREOPERATIVE DIAGNOSIS:         Peyronie's disease  POSTOPERATIVE DIAGNOSIS:       Same     PROCEDURE:   1. Penile plication     ANESTHESIA:            General   STAFF SURGEON:    Rinku Fitzpatrick MD present and scrubbed for entire procedure  RESIDENT:                Bong Cruz MD  ESTIMATED BLOOD LOSS: 2cc  COMPLICATIONS: None immediately.   SPECIMENS: None   DRAINS: None     FINDINGS: Penis straight with artificial erection at the conclusion of the case. A dorsal septal plaque appreciated on physical exam during the case, at the distal 1/3 of penis.      INDICATIONS: Mikey Jenkins is a 57 year old male who has history of Peyronie's disease.  He reports dorsal curvature of 45 degrees. He also desires sterilization  After discussion of risks, benefits and alternatives, the patient agreed to proceed with the above named procedures.     DESCRIPTION OF THE PROCEDURE: After obtaining informed consent, the patient was brought to the operating room and placed in the supine position on the operating room table. All pressure points were adequately cushioned and pneumatic compression devices were applied to the patient's bilateral lower extremities. Appropriate preoperative antibiotics were administered. General   anesthesia was induced without difficulty.  The patient was then prepped and draped in the usual sterile fashion. A timeout was performed to confirm the correct patient, positioning, procedure.       We began by performing a penile block with 20cc of 0.5% marcaine.  A circumcising incision was then made and penis was de-gloved proximally along kelley's fascia.  A tourniquet was placed at the base of the penis.  An artificial erection was created by injecting injectable saline into the corporal bodies.  Once erect, a 30-degree dorsal curvature was noted.  We took note of the point of maximal curvature and marked our proposed plication stitch sites on the ventral side.  The tourniquet was released and two plication stitches  were placed, after opening Marie's fascia.  The artificial erection was again induced and no further curvature remained.  The artificial erection was induced and was nice and straight     We then closed the bilateral Stephens fascia with 4-0 chromic suture.  The skin was then closed using interrupted chromic sutures at the cardinal points and 3-0 chromic interrupteds were used to close the skin.. The patient was awoken from anesthesia. He tolerated the procedure well and was sent to the PACU in stable condition.      I was present and scrubbed for the entire procedure.  Rinku Fitzpatrick MD  Urology Staff

## 2023-11-01 NOTE — TELEPHONE ENCOUNTER
[facility] Call Center    Phone Message    May a detailed message be left on voicemail: yes     Reason for Call: Other: Pt was told to take off bandages after 24 hours. Pt took off most of the bandages and would like to know if the one over the stitches need to be removed? Please call pt ASAP to answer. Thanks       Action Taken: Message routed to:  [facility] ([facility] Uro    Travel Screening: Not Applicable

## 2023-11-01 NOTE — PROGRESS NOTES
Brief Operative Note    Pre-operative diagnosis: Peyronie's disease [N48.6]   Post-operative diagnosis same   Procedure: Procedure(s):  PLICATION, PENIS   Surgeon: Rinku Fitzpatrick MD   Assistants(s): Bong Cruz MD   Estimated blood loss: 2cc    Specimens: None   Findings: ~30 degree dorsal curvature.  Curvature corrected with one pair of ventral plication sutures.

## 2023-11-02 DIAGNOSIS — M1A.09X0 CHRONIC GOUT OF MULTIPLE SITES, UNSPECIFIED CAUSE: ICD-10-CM

## 2023-11-02 RX ORDER — INDOMETHACIN 25 MG/1
CAPSULE ORAL
Qty: 30 CAPSULE | Refills: 2 | OUTPATIENT
Start: 2023-11-02

## 2023-11-03 NOTE — PROCEDURES
"-   SLEEP STUDY INTERPRETATION  DIAGNOSTIC POLYSOMNOGRAPHY REPORT      Patient: LIZABETH ALEXANDER  YOB: 1965  Study Date: 10/30/2023  MRN: [id number]  Referring Provider: Anai Le MD  Ordering Provider: Mitchel Collins MD    Indications for Polysomnography: The patient is a 57 year old Male who is 5' 11\" and weighs 216.0 lbs. His BMI is 30.2, Jackson sleepiness scale 12 and neck circumference is - cm. Relevant medical history includes HLD, TIERRA. A diagnostic polysomnogram was performed to evaluate for current severity of TIERRA.    Prior Sleep Testin2017 - PSG with undocumented weight.  AHI 20.3.  CPAP at 5-7 cm H2O appeared effective.    Polysomnogram Data: A full night polysomnogram recorded the standard physiologic parameters including EEG, EOG, EMG, ECG, nasal and oral airflow. Respiratory parameters of chest and abdominal movements were recorded with respiratory inductance plethysmography. Oxygen saturation was recorded by pulse oximetry. Hypopnea scoring rule used: 1B 4%.    Sleep Architecture: Mildly decreased sleep latency, no clear N3 observed, supine REM observed.  Increased arousal index.  The total recording time of the polysomnogram was 378.1 minutes. The total sleep time was 350.5 minutes. Sleep latency was decreased at 9.7 minutes without the use of a sleep aid. REM latency was 76.5 minutes. Arousal index was increased at 39.2 arousals per hour. Sleep efficiency was normal at 92.7%. Wake after sleep onset was 17.5 minutes. The patient spent 1.1% of total sleep time in Stage N1, 81.9% in Stage N2, 0.0% in Stage N3, and 17.0% in REM. Time in REM supine was 50.0 minutes.    Respiration: Severe TIERRA (AHI 37) with sleep-associated hypoxemia (SpO2 <= 88% for 25.2 minutes).  Events ? The polysomnogram revealed a presence of 76 obstructive, - central, and - mixed apneas resulting in an apnea index of 13.0 events per hour. There were 138 obstructive hypopneas and - central hypopneas " resulting in an obstructive hypopnea index of 23.6 and central hypopnea index of - events per hour. The combined apnea/hypopnea index was 37.0 events per hour (central apnea/hypopnea index was - events per hour). The REM AHI was 32.3 events per hour. The supine AHI was 37.8 events per hour. The RERA index was - events per hour.  The RDI was 37.0 events per hour.  Snoring - was reported as moderate.  Respiratory rate and pattern - was notable for normal respiratory rate and pattern.  Sustained Sleep Associated Hypoventilation - Transcutaneous carbon dioxide monitoring was not used, however significant hypoventilation was not suggested by oximetry.  Sleep Associated Hypoxemia - (Greater than 5 minutes O2 sat at or below 88%) was present. Baseline oxygen saturation was 92.6%. Lowest oxygen saturation was 70.8%. Time spent less than or equal to 88% was 25.2 minutes. Time spent less than or equal to 89% was 34.8 minutes.    Movement Activity: Nothing of note  Periodic Limb Activity - There were - PLMs during the entire study. The PLM index was - movements per hour. The PLM Arousal Index was - per hour.  REM EMG Activity - Excessive transient/sustained muscle activity was not present.  Nocturnal Behavior - Abnormal sleep related behaviors were not noted during/arising out of NREM / REM sleep.   Bruxism - None apparent.    Cardiac Summary: Appears NSR  The average pulse rate was 55.1 bpm. The minimum pulse rate was 47.0 bpm while the maximum pulse rate was 87.0 bpm.      Assessment:   Mildly decreased sleep latency, no clear N3 observed, supine REM observed.  Increased arousal index.  Severe TIERRA (AHI 37) with sleep-associated hypoxemia (SpO2 <= 88% for 25.2 minutes)    Recommendations:  Consider repeat polysomnography with full night titration of positive airway pressure therapy for the control of sleep disordered breathing.  Based on the presence of severe obstructive sleep apnea and excessive daytime sleepiness,  treatment could be empirically initiated with Auto?titrating PAP therapy with a range of 5 to 15 cmH2O. Recommend clinical follow up with sleep management team.  If devices are not acceptable or effective, patient may benefit from evaluation of possible surgical options. If he is interested, would recommend referral to specialized ENT-Sleep provider.  Advice regarding the risks of drowsy driving.  Suggest optimizing sleep schedule and avoiding sleep deprivation.  Weight management (if BMI > 30).  Pharmacologic therapy should be used for management of restless legs syndrome only if present and clinically indicated and not based on the presence of periodic limb movements alone.    Diagnostic Codes:   Obstructive Sleep Apnea G47.33  Sleep Hypoxemia/Hypoventilation G47.36      _____________________________________   Electronically Signed By: Mitchel Collins MD (11/3/2023)

## 2023-11-06 NOTE — TELEPHONE ENCOUNTER
Patient removed dressing completely as instructed by discharge orders. Patient currently not have any pain or discomfort at this time. He is not taking any pain medication at all. Reviewed shower and post op instructions with the patient. Patient is scheduled to return for post op with Dr Fitzpatrick 11/15/23    Emily Fox, RN, BSN  Care Coordinator Urology  [facility] [place]  [facility]  [phone]

## 2023-11-07 ENCOUNTER — OFFICE VISIT (OUTPATIENT)
Dept: FAMILY MEDICINE | Facility: OTHER | Age: 58
End: 2023-11-07
Attending: FAMILY MEDICINE
Payer: COMMERCIAL

## 2023-11-07 ENCOUNTER — TELEPHONE (OUTPATIENT)
Dept: UROLOGY | Facility: CLINIC | Age: 58
End: 2023-11-07
Payer: COMMERCIAL

## 2023-11-07 VITALS
RESPIRATION RATE: 16 BRPM | HEIGHT: 71 IN | SYSTOLIC BLOOD PRESSURE: 122 MMHG | WEIGHT: 210 LBS | OXYGEN SATURATION: 97 % | BODY MASS INDEX: 29.4 KG/M2 | TEMPERATURE: 97.8 F | DIASTOLIC BLOOD PRESSURE: 88 MMHG | HEART RATE: 104 BPM

## 2023-11-07 DIAGNOSIS — Z12.5 SCREENING FOR MALIGNANT NEOPLASM OF PROSTATE: ICD-10-CM

## 2023-11-07 DIAGNOSIS — E78.5 HYPERLIPIDEMIA, UNSPECIFIED HYPERLIPIDEMIA TYPE: ICD-10-CM

## 2023-11-07 DIAGNOSIS — Z00.00 ROUTINE GENERAL MEDICAL EXAMINATION AT A HEALTH CARE FACILITY: Primary | ICD-10-CM

## 2023-11-07 DIAGNOSIS — G47.33 OSA (OBSTRUCTIVE SLEEP APNEA): ICD-10-CM

## 2023-11-07 DIAGNOSIS — Z12.5 SCREENING FOR PROSTATE CANCER: ICD-10-CM

## 2023-11-07 DIAGNOSIS — M1A.09X0 CHRONIC GOUT OF MULTIPLE SITES, UNSPECIFIED CAUSE: ICD-10-CM

## 2023-11-07 DIAGNOSIS — Z82.49 FAMILY HISTORY OF ISCHEMIC HEART DISEASE: ICD-10-CM

## 2023-11-07 LAB
ALBUMIN SERPL BCG-MCNC: 4.6 G/DL (ref 3.5–5.2)
ALP SERPL-CCNC: 98 U/L (ref 40–129)
ALT SERPL W P-5'-P-CCNC: 50 U/L (ref 0–70)
ANION GAP SERPL CALCULATED.3IONS-SCNC: 14 MMOL/L (ref 7–15)
AST SERPL W P-5'-P-CCNC: 53 U/L (ref 0–45)
BILIRUB SERPL-MCNC: 0.4 MG/DL
BUN SERPL-MCNC: 14.4 MG/DL (ref 6–20)
CALCIUM SERPL-MCNC: 9.4 MG/DL (ref 8.6–10)
CHLORIDE SERPL-SCNC: 105 MMOL/L (ref 98–107)
CHOLEST SERPL-MCNC: 172 MG/DL
CREAT SERPL-MCNC: 0.95 MG/DL (ref 0.67–1.17)
DEPRECATED HCO3 PLAS-SCNC: 21 MMOL/L (ref 22–29)
EGFRCR SERPLBLD CKD-EPI 2021: >90 ML/MIN/1.73M2
GLUCOSE SERPL-MCNC: 123 MG/DL (ref 70–99)
HDLC SERPL-MCNC: 30 MG/DL
HOLD SPECIMEN: NORMAL
LDLC SERPL CALC-MCNC: ABNORMAL MG/DL
NONHDLC SERPL-MCNC: 142 MG/DL
POTASSIUM SERPL-SCNC: 3.7 MMOL/L (ref 3.4–5.3)
PROT SERPL-MCNC: 7.4 G/DL (ref 6.4–8.3)
PSA SERPL DL<=0.01 NG/ML-MCNC: 2.07 NG/ML (ref 0–3.5)
SODIUM SERPL-SCNC: 140 MMOL/L (ref 135–145)
TRIGL SERPL-MCNC: 515 MG/DL
URATE SERPL-MCNC: 6.8 MG/DL (ref 3.4–7)

## 2023-11-07 PROCEDURE — 84550 ASSAY OF BLOOD/URIC ACID: CPT | Performed by: FAMILY MEDICINE

## 2023-11-07 PROCEDURE — 36415 COLL VENOUS BLD VENIPUNCTURE: CPT | Performed by: FAMILY MEDICINE

## 2023-11-07 PROCEDURE — 80061 LIPID PANEL: CPT | Performed by: FAMILY MEDICINE

## 2023-11-07 PROCEDURE — 80053 COMPREHEN METABOLIC PANEL: CPT | Performed by: FAMILY MEDICINE

## 2023-11-07 PROCEDURE — 99396 PREV VISIT EST AGE 40-64: CPT | Performed by: FAMILY MEDICINE

## 2023-11-07 PROCEDURE — 99213 OFFICE O/P EST LOW 20 MIN: CPT | Mod: 25 | Performed by: FAMILY MEDICINE

## 2023-11-07 PROCEDURE — G0103 PSA SCREENING: HCPCS | Performed by: FAMILY MEDICINE

## 2023-11-07 ASSESSMENT — ENCOUNTER SYMPTOMS
SHORTNESS OF BREATH: 0
HEMATURIA: 0
ARTHRALGIAS: 0
DYSURIA: 0
PARESTHESIAS: 0
DIZZINESS: 0
FREQUENCY: 0
NERVOUS/ANXIOUS: 0
MYALGIAS: 0
ABDOMINAL PAIN: 0
EYE PAIN: 0
HEARTBURN: 0
DIARRHEA: 0
SORE THROAT: 0
COUGH: 0
JOINT SWELLING: 0
HEADACHES: 0
CONSTIPATION: 0
WEAKNESS: 0
PALPITATIONS: 0
FEVER: 0
NAUSEA: 0
HEMATOCHEZIA: 0
CHILLS: 0

## 2023-11-07 ASSESSMENT — PAIN SCALES - GENERAL: PAINLEVEL: NO PAIN (0)

## 2023-11-07 NOTE — TELEPHONE ENCOUNTER
[facility] Call Center    Phone Message    May a detailed message be left on voicemail: no     Reason for Call: Other: Patient called to let us know that the 1PM on the 15th works for him. Please reschedule patient to that time. Writer not able to reschedule per protocols.     Action Taken: Other: [facility]    Travel Screening: Not Applicable

## 2023-11-07 NOTE — TELEPHONE ENCOUNTER
Called patient LVM for patient to call the clinic back. It is ok to double book with the 1 pm.     Elisabeth Rosa LPN on 11/7/2023 at 1:33 PM

## 2023-11-07 NOTE — PROGRESS NOTES
SUBJECTIVE:   CC: Mikey is an 58 year old who presents for preventative health visit.       Healthy Habits:     Getting at least 3 servings of Calcium per day:  Yes    Bi-annual eye exam:  NO    Dental care twice a year:  Yes    Sleep apnea or symptoms of sleep apnea:  Daytime drowsiness, Excessive snoring and Sleep apnea    Diet:  Other    Frequency of exercise:  4-5 days/week    Duration of exercise:  30-45 minutes    Taking medications regularly:  Yes    Medication side effects:  Lightheadedness    Additional concerns today:  No      Hyperlipidemia Follow-Up    Are you regularly taking any medication or supplement to lower your cholesterol?   No  Are you having muscle aches or other side effects that you think could be caused by your cholesterol lowering medication?  No    Patient states sleep apnea is stable.  Overall gout symptoms are managed  He notes a family history of heart disease and wonders about cardiac calcium score screening.      Social History     Tobacco Use    Smoking status: Never    Smokeless tobacco: Never   Substance Use Topics    Alcohol use: Yes     Comment: 4 beers, weekly             10/31/2023    11:05 PM   Alcohol Use   Prescreen: >3 drinks/day or >7 drinks/week? No       Last PSA:   PSA   Date Value Ref Range Status   03/07/2018 1.55 0 - 4 ug/L Final     Comment:     Assay Method:  Chemiluminescence using Siemens Vista analyzer     Prostate Specific Antigen Screen   Date Value Ref Range Status   11/07/2023 2.07 0.00 - 3.50 ng/mL Final   10/05/2021 3.30 0.00 - 4.00 ug/L Final       Reviewed orders with patient. Reviewed health maintenance and updated orders accordingly - Yes  Patient Active Problem List   Diagnosis    Hearing loss    Gout, chronic    ACP (advance care planning)    Hyperlipidemia, unspecified hyperlipidemia type    Erectile dysfunction, unspecified erectile dysfunction type    Male hypogonadism    Arthropathy    Knee joint effusion    Peyronie's disease     Past Surgical  History:   Procedure Laterality Date    circumcision      COLONOSCOPY N/A 05/04/2023    Procedure: COLONOSCOPY WITH POLYPECTOMY;  Surgeon: Han Britton MD;  Location: [facility]    HERNIA REPAIR      umbilical    inguinal hernia repair  01/01/1987    KNEE ARTHROSCOPY W/ DEBRIDEMENT Left     JHOAN PROCEDURE N/A 10/31/2023    Procedure: PLICATION, PENIS;  Surgeon: Rinku Fitzpatrick MD;  Location: [facility] OR    ORTHOPEDIC SURGERY      right ankle    vasectomy      ZZC ORAL SURGERY PROCEDURE Left     wisdom tooth scaped        Social History     Tobacco Use    Smoking status: Never    Smokeless tobacco: Never   Substance Use Topics    Alcohol use: Yes     Comment: 4 beers, weekly     Family History   Problem Relation Age of Onset    C.A.D. Father     Dementia Father     Arthritis Brother         gout    Thyroid Disease Brother          Current Outpatient Medications   Medication Sig Dispense Refill    allopurinol (ZYLOPRIM) 300 MG tablet TAKE 1 TABLET BY MOUTH EVERY DAY 30 tablet 0    atorvastatin (LIPITOR) 40 MG tablet Take 1 tablet (40 mg) by mouth daily , due for appointment 30 tablet 0    colchicine (COLCRYS) 0.6 MG tablet Take 2 tablets at the first sign of flare, take 1 additional tablet one hour later. 9 tablet 0    ibuprofen (ADVIL/MOTRIN) 800 MG tablet Take 1 tablet (800 mg) by mouth 3 times daily as needed for mild pain 100 tablet 1    indomethacin (INDOCIN) 25 MG capsule Take 1-2 capsules (25-50 mg) by mouth 3 times daily as needed (gout) 30 capsule 2    sildenafil (REVATIO) 20 MG tablet Take 3-5 (60-100mg) tablets by mouth 1 hour prior to sexual activity 100 tablet 11    sodium chloride (OCEAN) 0.65 % nasal spray Spray 2 sprays in nostril 2 times daily as needed (nose bleeds) 15 mL 0    traZODone (DESYREL) 50 MG tablet Take 1 tablet (50 mg) by mouth at bedtime 30 tablet 2     No Known Allergies    Reviewed and updated as needed this visit by clinical staff   Tobacco  Allergies  Meds  Problems  Med  "Hx  Surg Hx  Fam Hx          Reviewed and updated as needed this visit by Provider   Tobacco  Allergies  Meds  Problems  Med Hx  Surg Hx  Fam Hx             Review of Systems   Constitutional:  Negative for chills and fever.   HENT:  Positive for hearing loss. Negative for congestion, ear pain and sore throat.    Eyes:  Positive for visual disturbance. Negative for pain.   Respiratory:  Negative for cough and shortness of breath.    Cardiovascular:  Negative for chest pain, palpitations and peripheral edema.   Gastrointestinal:  Negative for abdominal pain, constipation, diarrhea, heartburn, hematochezia and nausea.   Genitourinary:  Positive for impotence. Negative for dysuria, frequency, genital sores, hematuria, penile discharge and urgency.   Musculoskeletal:  Negative for arthralgias, joint swelling and myalgias.   Skin:  Negative for rash.   Neurological:  Negative for dizziness, weakness, headaches and paresthesias.   Psychiatric/Behavioral:  Negative for mood changes. The patient is not nervous/anxious.        OBJECTIVE:   /88 (BP Location: Right arm, Patient Position: Sitting, Cuff Size: Adult Regular)   Pulse 104   Temp 97.8  F (36.6  C) (Tympanic)   Resp 16   Ht 1.803 m (5' 11\")   Wt 95.3 kg (210 lb)   SpO2 97%   BMI 29.29 kg/m      Physical Exam  GENERAL: healthy, alert and no distress  EYES: Eyes grossly normal to inspection, PERRL and conjunctivae and sclerae normal  HENT: ear canals and TM's normal, nose and mouth without ulcers or lesions  NECK: no adenopathy  RESP: lungs clear to auscultation - no rales, rhonchi or wheezes  CV: regular rates and rhythm, normal S1 S2, no S3 or S4, and no murmur, click or rub  ABDOMEN: soft, nontender, no hepatosplenomegaly, no masses and bowel sounds normal  MS: no gross musculoskeletal defects noted, no edema  SKIN: no suspicious lesions or rashes  NEURO: Normal strength and tone, mentation intact and speech normal  PSYCH: mentation appears " "normal, affect normal/bright    Diagnostic Test Results:  See orders    ASSESSMENT/PLAN:       ICD-10-CM    1. Routine general medical examination at a health care facility  Z00.00       2. Hyperlipidemia, unspecified hyperlipidemia type  E78.5 Lipid Profile (Chol, Trig, HDL, LDL calc)     ALT     Lipid Profile (Chol, Trig, HDL, LDL calc)     Comprehensive metabolic panel     CANCELED: Lipid Profile (Chol, Trig, HDL, LDL calc)     CANCELED: ALT      3. TIERRA (obstructive sleep apnea)  G47.33       4. Chronic gout of multiple sites, unspecified cause  M1A.09X0 Uric acid     Uric acid      5. Screening for malignant neoplasm of prostate  Z12.5 PSA, screen     CANCELED: PSA, screen      6. Family history of ischemic heart disease  Z82.49 CT Coronary Calcium Scan      7. Screening for prostate cancer  Z12.5 PROSTATE SPEC ANTIGEN SCREEN              COUNSELING:   Reviewed preventive health counseling, as reflected in patient instructions      BMI:   Estimated body mass index is 29.29 kg/m  as calculated from the following:    Height as of this encounter: 1.803 m (5' 11\").    Weight as of this encounter: 95.3 kg (210 lb).       He reports that he has never smoked. He has never used smokeless tobacco.            Anai Le MD  [facility]  "

## 2023-11-07 NOTE — TELEPHONE ENCOUNTER
[facility] Call Center    Phone Message    May a detailed message be left on voicemail: yes     Reason for Call: Other: Pt calling regarding upcoming post op appt with Amari. Pt is requesting at later time that day about 1pm if possible. Please advise and call pt      Action Taken: Message routed to:  Other: [facility]    Travel Screening: Not Applicable

## 2023-11-07 NOTE — TELEPHONE ENCOUNTER
Called pt to let him know appointment time has been rescheduled to 1pm.  Pt confirmed appt time will work.    Enriqueta Cruz RN

## 2023-11-10 ENCOUNTER — VIRTUAL VISIT (OUTPATIENT)
Dept: PULMONOLOGY | Facility: OTHER | Age: 58
End: 2023-11-10
Attending: FAMILY MEDICINE
Payer: COMMERCIAL

## 2023-11-10 VITALS — BODY MASS INDEX: 29.4 KG/M2 | WEIGHT: 210 LBS | HEIGHT: 71 IN

## 2023-11-10 DIAGNOSIS — G47.33 OSA (OBSTRUCTIVE SLEEP APNEA): Primary | ICD-10-CM

## 2023-11-10 DIAGNOSIS — G47.10 HYPERSOMNIA: ICD-10-CM

## 2023-11-10 DIAGNOSIS — Z78.9 INTOLERANCE OF CONTINUOUS POSITIVE AIRWAY PRESSURE (CPAP) VENTILATION: ICD-10-CM

## 2023-11-10 PROCEDURE — 99214 OFFICE O/P EST MOD 30 MIN: CPT | Mod: 95 | Performed by: FAMILY MEDICINE

## 2023-11-10 ASSESSMENT — PAIN SCALES - GENERAL: PAINLEVEL: NO PAIN (0)

## 2023-11-10 NOTE — PROGRESS NOTES
"Virtual Visit Details    Type of service:  Video Visit     Originating Location (pt. Location): {video visit patient location:[id number]::\"Home\"}  {PROVIDER LOCATION On-site should be selected for visits conducted from your clinic location or adjoining [facility] hospital, academic office, or other nearby [facility] building. Off-site should be selected for all other provider locations, including home:[id number]}  Distant Location (provider location):  {virtual location provider:[id number]}  Platform used for Video Visit: {Virtual Visit Platforms:620598::\"Totango\"}    "

## 2023-11-10 NOTE — PROGRESS NOTES
"Mikey Jenkins is a 58 year old male who is being evaluated via a billable video visit.       The patient has been notified of following:      \"This video visit will be conducted via a call between you and your physician/provider. We have found that certain health care needs can be provided without the need for an in-person physical exam.  This service lets us provide the care you need with a video conversation.  If a prescription is necessary we can send it directly to your pharmacy.  If lab work is needed we can place an order for that and you can then stop by our lab to have the test done at a later time.     Video visits are billed at different rates depending on your insurance coverage.  Please reach out to your insurance provider with any questions.     If during the course of the call the physician/provider feels a video visit is not appropriate, you will not be charged for this service.\"     Patient has given verbal consent for Video visit? Yes  How would you like to obtain your AVS? Mail a copy  If you are dropped from the video visit, the video invite should be resent to: Text to cell phone: -  Will anyone else be joining your video visit? No  If patient encounters technical issues they should call [phone]      Video-Visit Details     Type of service:  Video Visit     Start Time:  3:30pm  End Time:  4pm    Originating Location (pt. Location): Home     Distant Location (provider location):  Off-site, [facility]       Platform used for Video Visit: [employer]    Virtual visit for review of diagnostic PSG results.     A/P:     1.)  Severe TIERRA (AHI 37) with sleep-associated hypoxemia (SpO2 <= 88% for 25.2 minutes)   2.)  Reported CPAP intolerance     No significant positional component noted.    Discussed options including trial of bilevel PAP auto-titrate spontaneous, PAP titration PSG, upper airway stimulation.    I do feel he has given CPAP an adequate attempt historically, " "attempted for well over 2 months, multiple mask interfaces tried.    Daytime symptoms of fatigue.  BMI ~30-31.  Recent diagnostic PSG with AHI 37, no central apnea, no evidence for positional apnea.    I feel he is a reasonable candidate for consideration for upper airway stimulation.  Placed referral for sleep ENT and discussed next steps, including sleep endoscopy.    SUBJECTIVE:  Mikey Jenkins is a 58 year old male.      58 year old male who is referred for history of TIERRA and CPAP intolerance.     Pertinent PMHx of HLD, TIERRA.     Prior Sleep Testin2017 - PSG with undocumented weight.  AHI 20.3.  CPAP at 5-7 cm H2O appeared effective.     STOP-BANG score of 6, with unknown neck circumference.  Chrisney score of 12.  ZAKI: 26     BMI of Estimated body mass index is 30.21 kg/m  as calculated from the following:    Height as of 10/19/23: 1.803 m (5' 11\").    Weight as of 10/19/23: 98.2 kg (216 lb 9.6 oz).      Today - He presents today to review sleep testing results and to discuss non-CPAP treatment options.  He has tried using CPAP for a few years, including recently over the past 3 months with different mask interfaces.  Barriers to usage have been difficulty with mask seal, taking mask off during sleep.  Pressure felt comfortable.  Daytime symptoms of fatigue, hypersomnia.    Per questionnaire: \"Can not sleep more than 3 hours at a time wake up constantly can not wear CPAP machine take it off during sleep \"     Goals = To find out if I need something better to get to sleep and stay asleep  can't think of new procedure so do not have to wear CPAP mask      Caffeine use:  Yes for 3+ per day.  Yes for within 6 hours of bed.     Sleep pattern:  Workdays.  11pm - 7:15am.  Weekends.  11pm - 9am.  Time to fall asleep: ~30 minutes.  Awakenings: 5 times per night, 30 minutes to return to sleep.  Note of consistent TV watching during awakenings.  Average total sleep time:  5 hours  Napping.  3-4 days per week, 0.5 " "hours per nap.     No for RLS screen.  No for sleep walking.  No for dream enactment behavior.  No for bruxism.     Yes for morning headaches.  Yes for snoring.  Yes for observed apnea.  Yes for FHx of TIERRA.    SLEEP STUDY INTERPRETATION  DIAGNOSTIC POLYSOMNOGRAPHY REPORT        Patient: LIZABETH ALEXANDER  YOB: 1965  Study Date: 10/30/2023  MRN: [id number]  Referring Provider: Anai Le MD  Ordering Provider: Mitchel Collins MD     Indications for Polysomnography: The patient is a 57 year old Male who is 5' 11\" and weighs 216.0 lbs. His BMI is 30.2, [place] sleepiness scale 12 and neck circumference is - cm. Relevant medical history includes HLD, TIERRA. A diagnostic polysomnogram was performed to evaluate for current severity of TIERRA.     Prior Sleep Testin2017 - PSG with undocumented weight.  AHI 20.3.  CPAP at 5-7 cm H2O appeared effective.     Polysomnogram Data: A full night polysomnogram recorded the standard physiologic parameters including EEG, EOG, EMG, ECG, nasal and oral airflow. Respiratory parameters of chest and abdominal movements were recorded with respiratory inductance plethysmography. Oxygen saturation was recorded by pulse oximetry. Hypopnea scoring rule used: 1B 4%.     Sleep Architecture: Mildly decreased sleep latency, no clear N3 observed, supine REM observed.  Increased arousal index.  The total recording time of the polysomnogram was 378.1 minutes. The total sleep time was 350.5 minutes. Sleep latency was decreased at 9.7 minutes without the use of a sleep aid. REM latency was 76.5 minutes. Arousal index was increased at 39.2 arousals per hour. Sleep efficiency was normal at 92.7%. Wake after sleep onset was 17.5 minutes. The patient spent 1.1% of total sleep time in Stage N1, 81.9% in Stage N2, 0.0% in Stage N3, and 17.0% in REM. Time in REM supine was 50.0 minutes.     Respiration: Severe TIERRA (AHI 37) with sleep-associated hypoxemia (SpO2 <= 88% for 25.2 " minutes).  Events ? The polysomnogram revealed a presence of 76 obstructive, - central, and - mixed apneas resulting in an apnea index of 13.0 events per hour. There were 138 obstructive hypopneas and - central hypopneas resulting in an obstructive hypopnea index of 23.6 and central hypopnea index of - events per hour. The combined apnea/hypopnea index was 37.0 events per hour (central apnea/hypopnea index was - events per hour). The REM AHI was 32.3 events per hour. The supine AHI was 37.8 events per hour. The RERA index was - events per hour.  The RDI was 37.0 events per hour.  Snoring - was reported as moderate.  Respiratory rate and pattern - was notable for normal respiratory rate and pattern.  Sustained Sleep Associated Hypoventilation - Transcutaneous carbon dioxide monitoring was not used, however significant hypoventilation was not suggested by oximetry.  Sleep Associated Hypoxemia - (Greater than 5 minutes O2 sat at or below 88%) was present. Baseline oxygen saturation was 92.6%. Lowest oxygen saturation was 70.8%. Time spent less than or equal to 88% was 25.2 minutes. Time spent less than or equal to 89% was 34.8 minutes.     Movement Activity: Nothing of note  Periodic Limb Activity - There were - PLMs during the entire study. The PLM index was - movements per hour. The PLM Arousal Index was - per hour.  REM EMG Activity - Excessive transient/sustained muscle activity was not present.  Nocturnal Behavior - Abnormal sleep related behaviors were not noted during/arising out of NREM / REM sleep.   Bruxism - None apparent.     Cardiac Summary: Appears NSR  The average pulse rate was 55.1 bpm. The minimum pulse rate was 47.0 bpm while the maximum pulse rate was 87.0 bpm.       Assessment:   Mildly decreased sleep latency, no clear N3 observed, supine REM observed.  Increased arousal index.  Severe TIERRA (AHI 37) with sleep-associated hypoxemia (SpO2 <= 88% for 25.2 minutes)     Recommendations:  Consider repeat  polysomnography with full night titration of positive airway pressure therapy for the control of sleep disordered breathing.  Based on the presence of severe obstructive sleep apnea and excessive daytime sleepiness, treatment could be empirically initiated with Auto?titrating PAP therapy with a range of 5 to 15 cmH2O. Recommend clinical follow up with sleep management team.  If devices are not acceptable or effective, patient may benefit from evaluation of possible surgical options. If he is interested, would recommend referral to specialized ENT-Sleep provider.  Advice regarding the risks of drowsy driving.  Suggest optimizing sleep schedule and avoiding sleep deprivation.  Weight management (if BMI > 30).  Pharmacologic therapy should be used for management of restless legs syndrome only if present and clinically indicated and not based on the presence of periodic limb movements alone.     Diagnostic Codes:   Obstructive Sleep Apnea G47.33  Sleep Hypoxemia/Hypoventilation G47.36      _____________________________________   Electronically Signed By: Mitchel Collins MD (11/3/2023)       Past medical history:    Patient Active Problem List    Diagnosis Date Noted    Peyronie's disease 07/26/2023     Priority: Medium    Erectile dysfunction, unspecified erectile dysfunction type 06/28/2018     Priority: Medium    Male hypogonadism 06/28/2018     Priority: Medium    Hyperlipidemia, unspecified hyperlipidemia type 11/07/2016     Priority: Medium    ACP (advance care planning) 05/10/2016     Priority: Medium     Advance Care Planning 5/10/2016: ACP Review of Chart / Resources Provided:  Reviewed chart for advance care plan.  Mikey Jenkins has no plan or code status on file. Discussed available resources and provided with information. Confirmed code status reflects current choices pending further ACP discussions.  Confirmed/documented legally designated decision makers.  Added by Susan Becker, chronic  09/09/2013     Priority: Medium    Arthropathy 08/01/2013     Priority: Medium    Hearing loss 12/03/2010     Priority: Medium    Knee joint effusion 02/09/2009     Priority: Medium       10 point ROS of systems including Constitutional, Eyes, Respiratory, Cardiovascular, Gastroenterology, Genitourinary, Integumentary, Muscularskeletal, Psychiatric were all negative except for pertinent positives noted in my HPI.    Current Outpatient Medications   Medication Sig Dispense Refill    allopurinol (ZYLOPRIM) 300 MG tablet TAKE 1 TABLET BY MOUTH EVERY DAY 30 tablet 0    atorvastatin (LIPITOR) 40 MG tablet Take 1 tablet (40 mg) by mouth daily , due for appointment 30 tablet 0    colchicine (COLCRYS) 0.6 MG tablet Take 2 tablets at the first sign of flare, take 1 additional tablet one hour later. 9 tablet 0    ibuprofen (ADVIL/MOTRIN) 800 MG tablet Take 1 tablet (800 mg) by mouth 3 times daily as needed for mild pain 100 tablet 1    indomethacin (INDOCIN) 25 MG capsule Take 1-2 capsules (25-50 mg) by mouth 3 times daily as needed (gout) 30 capsule 2    sildenafil (REVATIO) 20 MG tablet Take 3-5 (60-100mg) tablets by mouth 1 hour prior to sexual activity 100 tablet 11    sodium chloride (OCEAN) 0.65 % nasal spray Spray 2 sprays in nostril 2 times daily as needed (nose bleeds) 15 mL 0    traZODone (DESYREL) 50 MG tablet Take 1 tablet (50 mg) by mouth at bedtime 30 tablet 2       OBJECTIVE:  There were no vitals taken for this visit.    Physical Exam     ---  This note was written with the assistance of the Dragon voice-dictation technology software. The final document, although reviewed, may contain errors. For corrections, please contact the office.    Total time spent preparing to see the patient, review of chart, obtaining history and physical examination, review of sleep testing, review of treatment options, education, discussion with patient and documenting in Epic / EMR was 30 minutes.  All time involved was spent on  the day of service for the patient (the same day as the patient's appointment).    Mitchel Collins MD    Sleep Medicine  [place], [place]  Main Office: [phone]  [facility] [address], [address]  Schedule visits: [phone]  Main Office: [phone]  Fax: [phone]

## 2023-11-10 NOTE — NURSING NOTE
Is the patient currently in the state of [place]? YES    Visit mode:VIDEO    If the visit is dropped, the patient can be reconnected by: VIDEO VISIT: Text to cell phone:   Telephone Information:   Mobile [phone]       Will anyone else be joining the visit? NO  (If patient encounters technical issues they should call [phone]    How would you like to obtain your AVS? MyChart    Are changes needed to the allergy or medication list? No    Reason for visit: RECHECK    Rinku GOLDSTEIN

## 2023-11-15 ENCOUNTER — OFFICE VISIT (OUTPATIENT)
Dept: UROLOGY | Facility: CLINIC | Age: 58
End: 2023-11-15
Payer: COMMERCIAL

## 2023-11-15 DIAGNOSIS — Z09 POSTOP CHECK: ICD-10-CM

## 2023-11-15 DIAGNOSIS — N48.6 PEYRONIE'S DISEASE: Primary | ICD-10-CM

## 2023-11-15 PROCEDURE — 99024 POSTOP FOLLOW-UP VISIT: CPT | Performed by: UROLOGY

## 2023-11-15 NOTE — PROGRESS NOTES
CC: Mikey Jenkins is post-op from penile plication  done 10/31/23.    HPI: Patient is 2 wks post-op.  he has been doing well. No fevers or chills. Not having pain.   Has not noticed if erection is straight yet or not.      Exam:   There were no vitals taken for this visit.  Alert, NAD.   Respirations normal, non-labored.  Incision healing very well. No discharge, erythema or fluctuence suggestive of infection.     PLAN:   Bacitracin or similar ointment to incision BID   OK for intercourse/ sexual activity in 2 weeks.  I'm happy to see him back in the future as needed..

## 2023-11-15 NOTE — PROGRESS NOTES
CC: Mikey CANSECO Deloriss is post-op from *** done ***.    HPI: Patient is *** wks post-op.  he has been doing well. No fevers or chills. Pain decreasing.     Exam:   There were no vitals taken for this visit.  Alert, NAD.   Respirations normal, non-labored.  Incision *** healing well. No discharge, erythema or fluctuence suggestive of infection.     PLAN:   F/U ***.

## 2023-11-15 NOTE — NURSING NOTE
Mikey Jenkins's chief complaint for this visit includes:  Chief Complaint   Patient presents with    RECHECK     Post-op - DOS 10/31     PCP: Anai Le    Referring Provider:  No referring provider defined for this encounter.    There were no vitals taken for this visit.  Data Unavailable      No Known Allergies      Do you need any medication refills at today's visit? No    [facility] Assistant- Surgical Specialties

## 2023-11-24 ENCOUNTER — HOSPITAL ENCOUNTER (OUTPATIENT)
Dept: CT IMAGING | Facility: HOSPITAL | Age: 58
Discharge: HOME | End: 2023-11-24
Attending: FAMILY MEDICINE | Admitting: FAMILY MEDICINE
Payer: COMMERCIAL

## 2023-11-24 DIAGNOSIS — Z82.49 FAMILY HISTORY OF ISCHEMIC HEART DISEASE: ICD-10-CM

## 2023-11-24 LAB
CV CALCIUM SCORE AGATSTON LM: 0
CV CALCIUM SCORING AGATSON LAD: 0
CV CALCIUM SCORING AGATSTON CX: 0
CV CALCIUM SCORING AGATSTON RCA: 0
CV CALCIUM SCORING AGATSTON TOTAL: 0

## 2023-11-24 PROCEDURE — 75571 CT HRT W/O DYE W/CA TEST: CPT

## 2023-12-04 ENCOUNTER — MYC MEDICAL ADVICE (OUTPATIENT)
Dept: FAMILY MEDICINE | Facility: OTHER | Age: 58
End: 2023-12-04

## 2023-12-06 NOTE — TELEPHONE ENCOUNTER
Please let the patient know I can order the referral, but they are many months out in testing, so he likely wouldn't be seen until spring/summer at the earliest

## 2023-12-08 ENCOUNTER — MYC MEDICAL ADVICE (OUTPATIENT)
Dept: FAMILY MEDICINE | Facility: OTHER | Age: 58
End: 2023-12-08

## 2023-12-20 NOTE — PROGRESS NOTES
"  Assessment & Plan     1. Insomnia, unspecified type  Now that patient is retired, he may try Trazodone again as being tired the next day won't matter as much.  Follow-up as directed.    2. Idiopathic gout, unspecified chronicity, unspecified site  Refilled at current dose, follow-up in six months, recheck labs at that time  - allopurinol (ZYLOPRIM) 300 MG tablet; Take 1 tablet (300 mg) by mouth daily  Dispense: 90 tablet; Refill: 3    3. Hyperlipidemia, unspecified hyperlipidemia type  As above  - atorvastatin (LIPITOR) 40 MG tablet; Take 1 tablet (40 mg) by mouth daily  Dispense: 90 tablet; Refill: 3          BMI:   Estimated body mass index is 29.54 kg/m  as calculated from the following:    Height as of 11/10/23: 1.803 m (5' 11\").    Weight as of this encounter: 96.1 kg (211 lb 12.8 oz).     Return in about 6 months (around 6/22/2024) for Chronic Disease Management, Medication review.    Anai Le MD  [facility] [place] LUIZ Jensen is a 58 year old, presenting for the following health issues:  Disability        12/22/2023    10:53 AM   Additional Questions   Roomed by Agatha CALLOWAY RN   Accompanied by none       HPI     Concern - Wants to discuss FPC vs disability      Onset: Difficulty sleeping   Description: Sleeping 4-5 hours per night, using a CPAP for sleep apnea  Intensity: moderate  Progression of Symptoms:  worsening  Accompanying Signs & Symptoms: daytime tireness, unmotivated, \"not refreshed\"  Previous history of similar problem: ongoing  Precipitating factors:        Worsened by: None  Alleviating factors:        Improved by: None  Therapies tried and outcome: Tried Trazodone 50mg, states he took for 4 days and it did not help him sleep and felt more daytime grogginess so he stopped taking it.    Patient would like refills of allopurinol and atorvastatin before the end of the year.  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  He does wonder " if he qualifies for disability due to memory problems and his sleep issues.      Review of Systems   Constitutional, HEENT, cardiovascular, pulmonary, gi and gu systems are negative, except as otherwise noted.      Objective    /67 (BP Location: Right arm, Patient Position: Sitting, Cuff Size: Adult Regular)   Pulse 83   Temp 97.8  F (36.6  C) (Tympanic)   Resp 18   Wt 96.1 kg (211 lb 12.8 oz)   SpO2 96%   BMI 29.54 kg/m    Body mass index is 29.54 kg/m .  Physical Exam   GENERAL: healthy, alert and no distress  PSYCH: mentation appears normal, affect normal/bright                  Answers submitted by the patient for this visit:  Patient Health Questionnaire (Submitted on 12/22/2023)  If you checked off any problems, how difficult have these problems made it for you to do your work, take care of things at home, or get along with other people?: Somewhat difficult  PHQ9 TOTAL SCORE: 8  JOSE-7 (Submitted on 12/22/2023)  JOSE 7 TOTAL SCORE: 8

## 2023-12-22 ENCOUNTER — OFFICE VISIT (OUTPATIENT)
Dept: FAMILY MEDICINE | Facility: OTHER | Age: 58
End: 2023-12-22
Attending: FAMILY MEDICINE
Payer: COMMERCIAL

## 2023-12-22 VITALS
WEIGHT: 211.8 LBS | HEART RATE: 83 BPM | TEMPERATURE: 97.8 F | DIASTOLIC BLOOD PRESSURE: 67 MMHG | RESPIRATION RATE: 18 BRPM | OXYGEN SATURATION: 96 % | BODY MASS INDEX: 29.54 KG/M2 | SYSTOLIC BLOOD PRESSURE: 128 MMHG

## 2023-12-22 DIAGNOSIS — G47.00 INSOMNIA, UNSPECIFIED TYPE: Primary | ICD-10-CM

## 2023-12-22 DIAGNOSIS — E78.5 HYPERLIPIDEMIA, UNSPECIFIED HYPERLIPIDEMIA TYPE: ICD-10-CM

## 2023-12-22 DIAGNOSIS — M10.00 IDIOPATHIC GOUT, UNSPECIFIED CHRONICITY, UNSPECIFIED SITE: ICD-10-CM

## 2023-12-22 PROCEDURE — 99214 OFFICE O/P EST MOD 30 MIN: CPT | Performed by: FAMILY MEDICINE

## 2023-12-22 RX ORDER — ATORVASTATIN CALCIUM 40 MG/1
40 TABLET, FILM COATED ORAL DAILY
Qty: 90 TABLET | Refills: 3 | Status: SHIPPED | OUTPATIENT
Start: 2023-12-22 | End: 2024-09-20

## 2023-12-22 RX ORDER — TRAZODONE HYDROCHLORIDE 50 MG/1
50 TABLET, FILM COATED ORAL AT BEDTIME
COMMUNITY
Start: 2023-12-22

## 2023-12-22 RX ORDER — ALLOPURINOL 300 MG/1
1 TABLET ORAL DAILY
Qty: 90 TABLET | Refills: 3 | Status: SHIPPED | OUTPATIENT
Start: 2023-12-22 | End: 2024-09-20

## 2023-12-22 ASSESSMENT — ANXIETY QUESTIONNAIRES
GAD7 TOTAL SCORE: 8
6. BECOMING EASILY ANNOYED OR IRRITABLE: SEVERAL DAYS
3. WORRYING TOO MUCH ABOUT DIFFERENT THINGS: MORE THAN HALF THE DAYS
1. FEELING NERVOUS, ANXIOUS, OR ON EDGE: SEVERAL DAYS
IF YOU CHECKED OFF ANY PROBLEMS ON THIS QUESTIONNAIRE, HOW DIFFICULT HAVE THESE PROBLEMS MADE IT FOR YOU TO DO YOUR WORK, TAKE CARE OF THINGS AT HOME, OR GET ALONG WITH OTHER PEOPLE: SOMEWHAT DIFFICULT
GAD7 TOTAL SCORE: 8
5. BEING SO RESTLESS THAT IT IS HARD TO SIT STILL: NOT AT ALL
2. NOT BEING ABLE TO STOP OR CONTROL WORRYING: MORE THAN HALF THE DAYS
4. TROUBLE RELAXING: SEVERAL DAYS
7. FEELING AFRAID AS IF SOMETHING AWFUL MIGHT HAPPEN: SEVERAL DAYS

## 2023-12-22 ASSESSMENT — PAIN SCALES - GENERAL: PAINLEVEL: NO PAIN (0)

## 2023-12-22 ASSESSMENT — PATIENT HEALTH QUESTIONNAIRE - PHQ9
SUM OF ALL RESPONSES TO PHQ QUESTIONS 1-9: 8
SUM OF ALL RESPONSES TO PHQ QUESTIONS 1-9: 8
10. IF YOU CHECKED OFF ANY PROBLEMS, HOW DIFFICULT HAVE THESE PROBLEMS MADE IT FOR YOU TO DO YOUR WORK, TAKE CARE OF THINGS AT HOME, OR GET ALONG WITH OTHER PEOPLE: SOMEWHAT DIFFICULT

## 2024-01-08 ENCOUNTER — PATIENT OUTREACH (OUTPATIENT)
Dept: GASTROENTEROLOGY | Facility: CLINIC | Age: 59
End: 2024-01-08

## 2024-03-25 ENCOUNTER — MYC MEDICAL ADVICE (OUTPATIENT)
Dept: FAMILY MEDICINE | Facility: OTHER | Age: 59
End: 2024-03-25

## 2024-03-25 DIAGNOSIS — G31.84 MILD COGNITIVE IMPAIRMENT: ICD-10-CM

## 2024-03-25 DIAGNOSIS — R41.3 MEMORY LOSS: Primary | ICD-10-CM

## 2024-03-25 NOTE — LETTER
April 5, 2024      Re: Mikey Jenkins  [address]  [facility] [address]        To Whom It May Concern:    Mr. Mikey Jenkins is a patient of mine at the [facility] in [place], [place].  He has been diagnosed with mild cognitive impairment based on previous neuropsychological testing from 4/26/2018.  His first neuropsychological testing and evaluation occurred on 3/21/2017 at [facility] in [place], [place].  He was given the diagnosis of mild cognitive impairment at that time.  Repeat testing in 2018 was consistent with that diagnosis.  Mr. Jenkins' memory symptoms have worsened since that time.  He was evaluated by Dr. Yovanny Ramírez, Neurology at [facility], on 2/10/2022.  Repeat neuropsychological testing was recommended due to reported worsening of memory symptoms.  This will be scheduled at next availability.    Mr. Jenkins suffers from memory impairment that makes his work difficult.  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    Due to worsening symptoms, I would support Mr. Jenkins' pursuit of Social Security Disability.      Sincerely,        Anai Le MD

## 2024-03-28 NOTE — PROGRESS NOTES
"  Assessment & Plan     1. Mild cognitive impairment  Will write letter in support of disability claim.  He will obtain records from [facility] regarding previous neuropsychological testing, he will also likely need repeat testing.  A diagnostic assessment was also recommended for evaluation for possible mental health components, will look into options for that.    2. Memory loss    3. Anxiety       The longitudinal plan of care for the diagnosis(es)/condition(s) as documented were addressed during this visit. Due to the added complexity in care, I will continue to support Mikey in the subsequent management and with ongoing continuity of care.       BMI  Estimated body mass index is 29.99 kg/m  as calculated from the following:    Height as of 11/10/23: 1.803 m (5' 11\").    Weight as of this encounter: 97.5 kg (215 lb).       Depression Screening Follow Up        4/1/2024    11:25 AM   PHQ   PHQ-9 Total Score 10   Q9: Thoughts of better off dead/self-harm past 2 weeks Not at all         Follow Up Actions Taken  Will look into possible diagnostic assessment per disability        Return if symptoms worsen or fail to improve.      Subjective   Mikey is a 58 year old, presenting for the following health issues:  Memory Loss    HPI     Concern - Disability Paperwork   Onset: 1 year ago  Description: Memory loss  Intensity: moderate, severe  Progression of Symptoms:  worsening and constant  Accompanying Signs & Symptoms: insomnia, anxiety, OCD behavior   Previous history of similar problem: none  Precipitating factors:        Worsened by: none  Alleviating factors:        Improved by: none  Therapies tried and outcome: relaxation therapy-does not work      Patient did start the process of possible disability evaluation.  I did take a copy of the letter he received with the information he needs to gather.      Review of Systems  Constitutional, HEENT, cardiovascular, pulmonary, gi and gu systems are negative, except as " otherwise noted.      Objective    BP (!) 157/95 (BP Location: Left arm, Patient Position: Sitting, Cuff Size: Adult Large)   Pulse 62   Temp 97.6  F (36.4  C) (Tympanic)   Resp 18   Wt 97.5 kg (215 lb)   SpO2 98%   BMI 29.99 kg/m    Body mass index is 29.99 kg/m .  Physical Exam   GENERAL: alert and no distress  PSYCH: mentation appears normal, affect normal/bright          Signed Electronically by: Anai Le MD        Answers submitted by the patient for this visit:  Patient Health Questionnaire (Submitted on 4/1/2024)  If you checked off any problems, how difficult have these problems made it for you to do your work, take care of things at home, or get along with other people?: Very difficult  PHQ9 TOTAL SCORE: 10

## 2024-04-01 ENCOUNTER — OFFICE VISIT (OUTPATIENT)
Dept: FAMILY MEDICINE | Facility: OTHER | Age: 59
End: 2024-04-01
Attending: FAMILY MEDICINE
Payer: COMMERCIAL

## 2024-04-01 VITALS
SYSTOLIC BLOOD PRESSURE: 157 MMHG | OXYGEN SATURATION: 98 % | HEART RATE: 62 BPM | TEMPERATURE: 97.6 F | WEIGHT: 215 LBS | DIASTOLIC BLOOD PRESSURE: 95 MMHG | BODY MASS INDEX: 29.99 KG/M2 | RESPIRATION RATE: 18 BRPM

## 2024-04-01 DIAGNOSIS — R41.3 MEMORY LOSS: ICD-10-CM

## 2024-04-01 DIAGNOSIS — G31.84 MILD COGNITIVE IMPAIRMENT: Primary | ICD-10-CM

## 2024-04-01 DIAGNOSIS — F41.9 ANXIETY: ICD-10-CM

## 2024-04-01 PROCEDURE — G2211 COMPLEX E/M VISIT ADD ON: HCPCS | Performed by: FAMILY MEDICINE

## 2024-04-01 PROCEDURE — 99213 OFFICE O/P EST LOW 20 MIN: CPT | Performed by: FAMILY MEDICINE

## 2024-04-01 ASSESSMENT — PAIN SCALES - GENERAL: PAINLEVEL: NO PAIN (0)

## 2024-04-01 ASSESSMENT — PATIENT HEALTH QUESTIONNAIRE - PHQ9
10. IF YOU CHECKED OFF ANY PROBLEMS, HOW DIFFICULT HAVE THESE PROBLEMS MADE IT FOR YOU TO DO YOUR WORK, TAKE CARE OF THINGS AT HOME, OR GET ALONG WITH OTHER PEOPLE: VERY DIFFICULT
SUM OF ALL RESPONSES TO PHQ QUESTIONS 1-9: 10
SUM OF ALL RESPONSES TO PHQ QUESTIONS 1-9: 10

## 2024-04-02 NOTE — TELEPHONE ENCOUNTER
Suggestions from last neurology appointment from 02/10/2022  1.  repeat formal neuropsychology evaluation   2.  memory medication such as donepezil   3.  MRI of the brain   4.  treatment of his sleep apnea and I would encourage him to 5.  continue to look into other options as he has not tolerated the CPAP   6.  B12, folate, methylmalonic acid and homocystine, pyridoxine, thiamine, TSH, CMP, Lyme disease screen    Pended up neurology referral.  Diagnosis: memory loss.  Please review and sign if appropriate.

## 2024-04-05 PROBLEM — G31.84 MILD COGNITIVE IMPAIRMENT: Status: ACTIVE | Noted: 2024-04-05

## 2024-04-05 NOTE — TELEPHONE ENCOUNTER
Called and spoke to Kelsey in the psych department and per Kelsey they do not do diagnostic testing.  She said that this is usually done by a therapist.

## 2024-04-05 NOTE — TELEPHONE ENCOUNTER
Letter to support disability is now in chart.  I will also sign neuropsych testing order, please let patient know these have been scheduling out a ways, just so he knows.

## 2024-04-05 NOTE — TELEPHONE ENCOUNTER
Also, can you find out if our Psych team does diagnostic assessments for mental health?  Per letter patient received for starting a disability claim -  A Diagnostic Assessment is a written report that documents the clinical evaluation of a recipient's mental health.  I need to review a copy of it, because it will help me identify the severity of your limitations as well as any complicating factors such as drug or alcohol abuse or noncompliance with treatment.  I don't know if he actually needs this, but it was recommended by the Disability Specialists representative

## 2024-04-05 NOTE — TELEPHONE ENCOUNTER
Per recommendations from Kelsey in the psych department.    Call placed to [facility] and they call it a comprehensive eval and the pt would just need to call and scheduled an appointment with them at [phone].     Call placed to [facility] in [place] at [phone] and no answer.    Call placed to [facility] [phone] and no answer.  This looks as if it is more child and family centered.     Call placed to [facility] at [phone] and they do Diagnostic Assessment.  Pt can either call to schedule an intake visit first and then after completion of that could schedule the Diagnostic Assessment or a referral can be faxed over to them [phone].

## 2024-04-08 NOTE — TELEPHONE ENCOUNTER
Thank you for looking into all of this.  Please let patient know what you found out, and find out where he would like to go.  Thanks again!

## 2024-04-09 NOTE — TELEPHONE ENCOUNTER
Neuropsych eval was already signed 4/5/2024 (Neurology referral to send to [facility])

## 2024-04-09 NOTE — TELEPHONE ENCOUNTER
Pt would like to go to [facility] for the diagnostic assessment.  Pt would need to call them to make appointment.  Will update pt that he will just need to call them.     Pt would also like to be seen for neuropsychological testing at [facility] in [place].     Pended up neuropsychologist referral per pt request.  Diagnosis: memory loss and mild cognitive impairment.    Please review and sign is appropriate.

## 2024-05-02 ENCOUNTER — TELEPHONE (OUTPATIENT)
Dept: FAMILY MEDICINE | Facility: OTHER | Age: 59
End: 2024-05-02

## 2024-05-02 DIAGNOSIS — G89.4 CHRONIC PAIN SYNDROME: ICD-10-CM

## 2024-05-02 RX ORDER — IBUPROFEN 800 MG/1
800 TABLET, FILM COATED ORAL 3 TIMES DAILY PRN
Qty: 100 TABLET | Refills: 1 | Status: SHIPPED | OUTPATIENT
Start: 2024-05-02

## 2024-05-02 NOTE — TELEPHONE ENCOUNTER
9:02 AM    Reason for Call: Phone Call    Description: Patient called in asking if he could get another prescription for Ibuprofen 800MG sent to [facility] in [facility] as he's still getting swelling in his knee. Please call patient back.     Was an appointment offered for this call? No  If yes : Appointment type              Date    Preferred method for responding to this message: Telephone Call  What is your phone number ? [phone]     If we cannot reach you directly, may we leave a detailed response at the number you provided? Yes    Can this message wait until your PCP/provider returns, if available today? Not applicable, PROVIDER IN CLINIC    Kerri Chow

## 2024-08-21 ENCOUNTER — OFFICE VISIT (OUTPATIENT)
Dept: FAMILY MEDICINE | Facility: OTHER | Age: 59
End: 2024-08-21
Attending: NURSE PRACTITIONER
Payer: COMMERCIAL

## 2024-08-21 VITALS
HEART RATE: 60 BPM | WEIGHT: 223.4 LBS | SYSTOLIC BLOOD PRESSURE: 126 MMHG | RESPIRATION RATE: 16 BRPM | TEMPERATURE: 97.3 F | DIASTOLIC BLOOD PRESSURE: 86 MMHG | HEIGHT: 71 IN | OXYGEN SATURATION: 98 % | BODY MASS INDEX: 31.27 KG/M2

## 2024-08-21 DIAGNOSIS — H65.91 OME (OTITIS MEDIA WITH EFFUSION), RIGHT: Primary | ICD-10-CM

## 2024-08-21 PROCEDURE — G2211 COMPLEX E/M VISIT ADD ON: HCPCS | Performed by: NURSE PRACTITIONER

## 2024-08-21 PROCEDURE — 99213 OFFICE O/P EST LOW 20 MIN: CPT | Performed by: NURSE PRACTITIONER

## 2024-08-21 RX ORDER — AMOXICILLIN 875 MG
875 TABLET ORAL 2 TIMES DAILY
Qty: 20 TABLET | Refills: 0 | Status: SHIPPED | OUTPATIENT
Start: 2024-08-21 | End: 2024-08-31

## 2024-08-21 ASSESSMENT — PAIN SCALES - GENERAL: PAINLEVEL: EXTREME PAIN (8)

## 2024-08-21 NOTE — PROGRESS NOTES
"  Assessment & Plan     1. OME (otitis media with effusion), right  Tylenol, ibuprofen as needed  Orajel to mouth sores.    - amoxicillin (AMOXIL) 875 MG tablet; Take 1 tablet (875 mg) by mouth 2 times daily for 10 days.  Dispense: 20 tablet; Refill: 0        BMI  Estimated body mass index is 31.16 kg/m  as calculated from the following:    Height as of this encounter: 1.803 m (5' 11\").    Weight as of this encounter: 101.3 kg (223 lb 6.4 oz).   Weight management plan: Discussed healthy diet and exercise guidelines        Follow-up if no improvement or any worsening.     The longitudinal plan of care for the diagnosis(es)/condition(s) as documented were addressed during this visit. Due to the added complexity in care, I will continue to support Mikey in the subsequent management and with ongoing continuity of care.      Cyndie Jalloh,   Certified Adult Nurse Practitioner  [phone]      Subjective   Mikey is a 58 year old, presenting for the following health issues:  Ear Problem        8/21/2024    10:29 AM   Additional Questions   Roomed by karin   Accompanied by none     HPI     Concern - ear pain   Onset: 4 days   Description: right ear when swallowing   Intensity: moderate  Progression of Symptoms:  same  Accompanying Signs & Symptoms: roof of mouth sensitive to food etc.   Previous history of similar problem: yes  Precipitating factors:        Worsened by: na  Alleviating factors:        Improved by: sleep not eat or drink   Therapies tried and outcome: ibuprofen        Recent Labs   Lab Test 11/07/23  1458 03/14/22  1112 10/05/21  1115 10/05/21  1115 07/14/20  1043 08/20/18  1358 09/22/17  0956 11/07/16  0905 08/08/16  0913   LDL  --   --   --   --  45 Cannot estimate LDL when triglyceride exceeds 400 mg/dL 45   < > Cannot estimate LDL when triglyceride exceeds 400 mg/dL   HDL 30* 29*  --  30* 33* 28* 28*   < > 25*   TRIG 515* 497*  --  534* 266* 500* 275*   < > 679*   ALT 50 22  --   --  36 41 38   < " ">  --    CR 0.95 1.08   < > 1.20 1.04  --   --   --   --    GFRESTIMATED >90 81   < > 68 81  --   --   --   --    GFRESTBLACK  --   --   --   --  >90  --   --   --   --    POTASSIUM 3.7 4.0   < > 4.1 4.1  --   --   --   --    TSH  --  4.34*  --   --   --   --   --   --  10.99*    < > = values in this interval not displayed.      BP Readings from Last 3 Encounters:   08/21/24 126/86   04/01/24 (!) 157/95   12/22/23 128/67    Wt Readings from Last 3 Encounters:   08/21/24 101.3 kg (223 lb 6.4 oz)   04/01/24 97.5 kg (215 lb)   12/22/23 96.1 kg (211 lb 12.8 oz)                  Review of Systems  Constitutional, neuro, ENT, endocrine, pulmonary, cardiac, gastrointestinal, genitourinary, musculoskeletal, integument and psychiatric systems are negative, except as otherwise noted.      Objective    /86 (BP Location: Left arm, Patient Position: Chair, Cuff Size: Adult Regular)   Pulse 60   Temp 97.3  F (36.3  C) (Tympanic)   Resp 16   Ht 1.803 m (5' 11\")   Wt 101.3 kg (223 lb 6.4 oz)   SpO2 98%   BMI 31.16 kg/m    Body mass index is 31.16 kg/m .  Physical Exam   GENERAL: alert and no distress  HENT: normal cephalic/atraumatic, right ear: erythematous and bulging membrane, and left ear: normal: no effusions, no erythema, normal landmarks.  Throat clear, 5 small oral ulcers on upper palet  NECK: no adenopathy, no asymmetry, masses, or scars  RESP: lungs clear to auscultation - no rales, rhonchi or wheezes  CV: regular rate and rhythm, normal S1 S2, no S3 or S4, no murmur, click or rub, no peripheral edema  MS: no gross musculoskeletal defects noted, no edema  PSYCH: mentation appears normal, affect normal/bright          Signed Electronically by: Cyndie Jalloh NP    "

## 2024-09-18 DIAGNOSIS — E78.5 HYPERLIPIDEMIA, UNSPECIFIED HYPERLIPIDEMIA TYPE: ICD-10-CM

## 2024-09-18 DIAGNOSIS — M10.00 IDIOPATHIC GOUT, UNSPECIFIED CHRONICITY, UNSPECIFIED SITE: ICD-10-CM

## 2024-09-18 NOTE — TELEPHONE ENCOUNTER
Zyloprim       Last Written Prescription Date:  12/22/2023  Last Fill Quantity: 90,   # refills: 3  Last Office Visit: 8/21/2024  Future Office visit:       Lipitor       Last Written Prescription Date:  12/22/2023  Last Fill Quantity: 90,   # refills: 0  Last Office Visit: 8/21/2024  Future Office visit:

## 2024-09-20 RX ORDER — ALLOPURINOL 300 MG/1
300 TABLET ORAL DAILY
Qty: 90 TABLET | Refills: 3 | Status: SHIPPED | OUTPATIENT
Start: 2024-09-20

## 2024-09-20 RX ORDER — ATORVASTATIN CALCIUM 40 MG/1
40 TABLET, FILM COATED ORAL DAILY
Qty: 90 TABLET | Refills: 3 | Status: SHIPPED | OUTPATIENT
Start: 2024-09-20

## 2024-09-20 NOTE — TELEPHONE ENCOUNTER
ALLOPURINOL 300MG TABLETS     Gout Agents Protocol [id number]/18/2024 08:00 AM   Protocol Details Has Uric Acid on file in past 12 months and value is less than 6     Uric Acid   Date Value Ref Range Status   11/07/2023 6.8 3.4 - 7.0 mg/dL Final   07/14/2020 6.0 3.5 - 7.2 mg/dL Final       ATORVASTATIN 40MG TABLETS     Antihyperlipidemic agents [id number]/18/2024 08:00 AM   Protocol Details LDL on file in the past 12 months     LDL Cholesterol Calculated   Date Value Ref Range Status   11/07/2023   Final     Comment:     Cannot estimate LDL when triglyceride exceeds 400 mg/dL   07/14/2020 45 <100 mg/dL Final     Comment:     Desirable:       <100 mg/dl

## 2024-12-23 DIAGNOSIS — M10.00 IDIOPATHIC GOUT, UNSPECIFIED CHRONICITY, UNSPECIFIED SITE: ICD-10-CM

## 2024-12-23 DIAGNOSIS — E78.5 HYPERLIPIDEMIA, UNSPECIFIED HYPERLIPIDEMIA TYPE: ICD-10-CM

## 2024-12-23 NOTE — TELEPHONE ENCOUNTER
Atorvastatin (Lipitor) 40 mg tablet  Take 1 tablet (40 mg) by mouth daily   Last Written Prescription Date:  9-20-24  Last Fill Quantity: 90 tablet,   # refills: 3  Last Office Visit: 8-21-24  Future Office visit:       Allopurinol (Zyloprim) 300 mg tablet  Take 1 tablet (300 mg) by mouth daily      Last Written Prescription Date:  9-20-24  Last Fill Quantity: 90 tablet,   # refills: 3  Last Office Visit: 8-21-24  Future Office visit:

## 2024-12-26 RX ORDER — ALLOPURINOL 300 MG/1
300 TABLET ORAL DAILY
Qty: 90 TABLET | Refills: 0 | Status: SHIPPED | OUTPATIENT
Start: 2024-12-26

## 2024-12-26 RX ORDER — ATORVASTATIN CALCIUM 40 MG/1
40 TABLET, FILM COATED ORAL DAILY
Qty: 90 TABLET | Refills: 0 | Status: SHIPPED | OUTPATIENT
Start: 2024-12-26

## 2024-12-28 ENCOUNTER — HOSPITAL ENCOUNTER (EMERGENCY)
Facility: HOSPITAL | Age: 59
Discharge: HOME | End: 2024-12-28
Attending: NURSE PRACTITIONER
Payer: COMMERCIAL

## 2024-12-28 VITALS
OXYGEN SATURATION: 93 % | SYSTOLIC BLOOD PRESSURE: 138 MMHG | RESPIRATION RATE: 13 BRPM | HEART RATE: 94 BPM | TEMPERATURE: 102.2 F | DIASTOLIC BLOOD PRESSURE: 79 MMHG

## 2024-12-28 DIAGNOSIS — J10.1 INFLUENZA A: ICD-10-CM

## 2024-12-28 DIAGNOSIS — R55 VASOVAGAL SYNCOPE: ICD-10-CM

## 2024-12-28 LAB
ALBUMIN SERPL BCG-MCNC: 4.3 G/DL (ref 3.5–5.2)
ALP SERPL-CCNC: 80 U/L (ref 40–150)
ALT SERPL W P-5'-P-CCNC: 31 U/L (ref 0–70)
ANION GAP SERPL CALCULATED.3IONS-SCNC: 14 MMOL/L (ref 7–15)
AST SERPL W P-5'-P-CCNC: 46 U/L (ref 0–45)
BILIRUB SERPL-MCNC: 0.3 MG/DL
BUN SERPL-MCNC: 13.8 MG/DL (ref 8–23)
CALCIUM SERPL-MCNC: 8.7 MG/DL (ref 8.8–10.4)
CHLORIDE SERPL-SCNC: 101 MMOL/L (ref 98–107)
CREAT SERPL-MCNC: 1.2 MG/DL (ref 0.67–1.17)
EGFRCR SERPLBLD CKD-EPI 2021: 70 ML/MIN/1.73M2
ERYTHROCYTE [DISTWIDTH] IN BLOOD BY AUTOMATED COUNT: 13 % (ref 10–15)
FLUAV RNA SPEC QL NAA+PROBE: POSITIVE
FLUBV RNA RESP QL NAA+PROBE: NEGATIVE
GLUCOSE SERPL-MCNC: 113 MG/DL (ref 70–99)
HCO3 SERPL-SCNC: 20 MMOL/L (ref 22–29)
HCT VFR BLD AUTO: 42.6 % (ref 40–53)
HGB BLD-MCNC: 14.6 G/DL (ref 13.3–17.7)
HOLD SPECIMEN: NORMAL
MAGNESIUM SERPL-MCNC: 1.6 MG/DL (ref 1.7–2.3)
MCH RBC QN AUTO: 29.4 PG (ref 26.5–33)
MCHC RBC AUTO-ENTMCNC: 34.3 G/DL (ref 31.5–36.5)
MCV RBC AUTO: 86 FL (ref 78–100)
PLATELET # BLD AUTO: 157 10E3/UL (ref 150–450)
POTASSIUM SERPL-SCNC: 3.9 MMOL/L (ref 3.4–5.3)
PROT SERPL-MCNC: 7 G/DL (ref 6.4–8.3)
RBC # BLD AUTO: 4.96 10E6/UL (ref 4.4–5.9)
RSV RNA SPEC NAA+PROBE: NEGATIVE
SARS-COV-2 RNA RESP QL NAA+PROBE: NEGATIVE
SODIUM SERPL-SCNC: 135 MMOL/L (ref 135–145)
TROPONIN T SERPL HS-MCNC: 7 NG/L
TROPONIN T SERPL HS-MCNC: 7 NG/L
WBC # BLD AUTO: 4.4 10E3/UL (ref 4–11)

## 2024-12-28 PROCEDURE — 96374 THER/PROPH/DIAG INJ IV PUSH: CPT

## 2024-12-28 PROCEDURE — 87637 SARSCOV2&INF A&B&RSV AMP PRB: CPT | Performed by: NURSE PRACTITIONER

## 2024-12-28 PROCEDURE — 84484 ASSAY OF TROPONIN QUANT: CPT | Performed by: NURSE PRACTITIONER

## 2024-12-28 PROCEDURE — 80053 COMPREHEN METABOLIC PANEL: CPT | Performed by: NURSE PRACTITIONER

## 2024-12-28 PROCEDURE — 250N000011 HC RX IP 250 OP 636: Performed by: NURSE PRACTITIONER

## 2024-12-28 PROCEDURE — 93010 ELECTROCARDIOGRAM REPORT: CPT | Performed by: INTERNAL MEDICINE

## 2024-12-28 PROCEDURE — 96361 HYDRATE IV INFUSION ADD-ON: CPT

## 2024-12-28 PROCEDURE — 85041 AUTOMATED RBC COUNT: CPT | Performed by: NURSE PRACTITIONER

## 2024-12-28 PROCEDURE — 93005 ELECTROCARDIOGRAM TRACING: CPT

## 2024-12-28 PROCEDURE — 36415 COLL VENOUS BLD VENIPUNCTURE: CPT | Performed by: NURSE PRACTITIONER

## 2024-12-28 PROCEDURE — 258N000003 HC RX IP 258 OP 636: Performed by: NURSE PRACTITIONER

## 2024-12-28 PROCEDURE — 83735 ASSAY OF MAGNESIUM: CPT | Performed by: NURSE PRACTITIONER

## 2024-12-28 PROCEDURE — 96375 TX/PRO/DX INJ NEW DRUG ADDON: CPT

## 2024-12-28 PROCEDURE — 99284 EMERGENCY DEPT VISIT MOD MDM: CPT | Mod: 25

## 2024-12-28 PROCEDURE — 99284 EMERGENCY DEPT VISIT MOD MDM: CPT | Performed by: NURSE PRACTITIONER

## 2024-12-28 PROCEDURE — 85018 HEMOGLOBIN: CPT | Performed by: NURSE PRACTITIONER

## 2024-12-28 RX ORDER — ACETAMINOPHEN 325 MG/1
975 TABLET ORAL ONCE
Status: DISCONTINUED | OUTPATIENT
Start: 2024-12-28 | End: 2024-12-28 | Stop reason: HOSPADM

## 2024-12-28 RX ORDER — KETOROLAC TROMETHAMINE 30 MG/ML
30 INJECTION, SOLUTION INTRAMUSCULAR; INTRAVENOUS ONCE
Status: COMPLETED | OUTPATIENT
Start: 2024-12-28 | End: 2024-12-28

## 2024-12-28 RX ADMIN — PROCHLORPERAZINE EDISYLATE 10 MG: 5 INJECTION INTRAMUSCULAR; INTRAVENOUS at 18:31

## 2024-12-28 RX ADMIN — KETOROLAC TROMETHAMINE 30 MG: 30 INJECTION, SOLUTION INTRAMUSCULAR at 18:45

## 2024-12-28 RX ADMIN — SODIUM CHLORIDE, POTASSIUM CHLORIDE, SODIUM LACTATE AND CALCIUM CHLORIDE 1000 ML: 600; 310; 30; 20 INJECTION, SOLUTION INTRAVENOUS at 18:33

## 2024-12-28 ASSESSMENT — ENCOUNTER SYMPTOMS
FATIGUE: 1
LIGHT-HEADEDNESS: 1
RESPIRATORY NEGATIVE: 1
HEADACHES: 0
PSYCHIATRIC NEGATIVE: 1
ENDOCRINE NEGATIVE: 1
ALLERGIC/IMMUNOLOGIC NEGATIVE: 1
NUMBNESS: 0
SEIZURES: 0
EYES NEGATIVE: 1
HEMATOLOGIC/LYMPHATIC NEGATIVE: 1
CARDIOVASCULAR NEGATIVE: 1
MUSCULOSKELETAL NEGATIVE: 1
DIZZINESS: 0
VOMITING: 1

## 2024-12-28 ASSESSMENT — COLUMBIA-SUICIDE SEVERITY RATING SCALE - C-SSRS
2. HAVE YOU ACTUALLY HAD ANY THOUGHTS OF KILLING YOURSELF IN THE PAST MONTH?: NO
1. IN THE PAST MONTH, HAVE YOU WISHED YOU WERE DEAD OR WISHED YOU COULD GO TO SLEEP AND NOT WAKE UP?: NO
6. HAVE YOU EVER DONE ANYTHING, STARTED TO DO ANYTHING, OR PREPARED TO DO ANYTHING TO END YOUR LIFE?: NO

## 2024-12-28 ASSESSMENT — ACTIVITIES OF DAILY LIVING (ADL)
ADLS_ACUITY_SCORE: 41
ADLS_ACUITY_SCORE: 41

## 2024-12-29 NOTE — ED TRIAGE NOTES
Patient arrives via [facility] from the [facility]. 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 Patient was awake when EMS arrived. Patient did have an emesis, but declined IV and medications from EMS. Patient also reports that he didn't sleep much last night due to knee pain. Patient also reports that he ate some shrimp last night and wonders if it was bad.

## 2024-12-29 NOTE — ED PROVIDER NOTES
"  History     Chief Complaint   Patient presents with    Syncope    Nausea & Vomiting     HPI  Mikey Jenkins is a 59 year old individual with history of chronic gout, comes in after syncopal episode.  Patient was watching basketball game and became lightheaded.  Patient states \"he felt like his get a pass out\".  States that he became nauseated and did wake up on the floor.  Patient did vomit apparently when he passed out.  This was witnessed and there was no apparent seizure-like activity.  Patient currently feels tired.  No headache or complaints of pain on arrival.  No chest pain or shortness of breath.    Allergies:  No Known Allergies    Problem List:    Patient Active Problem List    Diagnosis Date Noted    Mild cognitive impairment 04/05/2024     Priority: Medium    Peyronie's disease 07/26/2023     Priority: Medium    Erectile dysfunction, unspecified erectile dysfunction type 06/28/2018     Priority: Medium    Male hypogonadism 06/28/2018     Priority: Medium    Hyperlipidemia, unspecified hyperlipidemia type 11/07/2016     Priority: Medium    ACP (advance care planning) 05/10/2016     Priority: Medium     Advance Care Planning 5/10/2016: ACP Review of Chart / Resources Provided:  Reviewed chart for advance care plan.  Mikey Jenkins has no plan or code status on file. Discussed available resources and provided with information. Confirmed code status reflects current choices pending further ACP discussions.  Confirmed/documented legally designated decision makers.  Added by Susan Monae          Gout, chronic 09/09/2013     Priority: Medium    Arthropathy 08/01/2013     Priority: Medium    Hearing loss 12/03/2010     Priority: Medium    Knee joint effusion 02/09/2009     Priority: Medium        Past Medical History:    Past Medical History:   Diagnosis Date    Arthropathy 8/1/2013    Erectile dysfunction, unspecified erectile dysfunction type 6/28/2018    Gout, unspecified 12/3/2010    Hyperlipidemia, " unspecified hyperlipidemia type 11/7/2016    Knee joint effusion 2/9/2009    Male hypogonadism 6/28/2018    Unspecified hearing loss 12/3/2010       Past Surgical History:    Past Surgical History:   Procedure Laterality Date    circumcision      COLONOSCOPY N/A 05/04/2023    Procedure: COLONOSCOPY WITH POLYPECTOMY;  Surgeon: Han Britton MD;  Location: [facility] OR    HERNIA REPAIR      umbilical    inguinal hernia repair  01/01/1987    KNEE ARTHROSCOPY W/ DEBRIDEMENT Left     JHOAN PROCEDURE N/A 10/31/2023    Procedure: PLICATION, PENIS;  Surgeon: Rinku Fitzpatrick MD;  Location: [facility] [place]    ORTHOPEDIC SURGERY      right ankle    vasectomy      ZZC ORAL SURGERY PROCEDURE Left     wisdom tooth scaped        Family History:    Family History   Problem Relation Age of Onset    C.A.D. Father     Dementia Father     Arthritis Brother         gout    Thyroid Disease Brother        Social History:  Marital Status:   [4]  Social History     Tobacco Use    Smoking status: Never     Passive exposure: Never    Smokeless tobacco: Never   Vaping Use    Vaping status: Never Used   Substance Use Topics    Alcohol use: Yes     Comment: 4 beers, weekly    Drug use: No        Medications:    allopurinol (ZYLOPRIM) 300 MG tablet  atorvastatin (LIPITOR) 40 MG tablet  colchicine (COLCRYS) 0.6 MG tablet  ibuprofen (ADVIL/MOTRIN) 800 MG tablet  indomethacin (INDOCIN) 25 MG capsule  traZODone (DESYREL) 50 MG tablet  sildenafil (REVATIO) 20 MG tablet  sodium chloride (OCEAN) 0.65 % nasal spray          Review of Systems   Constitutional:  Positive for fatigue.   HENT: Negative.     Eyes: Negative.    Respiratory: Negative.     Cardiovascular: Negative.    Gastrointestinal:  Positive for vomiting.   Endocrine: Negative.    Genitourinary: Negative.    Musculoskeletal: Negative.    Skin: Negative.    Allergic/Immunologic: Negative.    Neurological:  Positive for syncope and light-headedness. Negative for dizziness, seizures,  numbness and headaches.   Hematological: Negative.    Psychiatric/Behavioral: Negative.         Physical Exam   BP: 128/75  Pulse: 83  Temp: (!) 100.9  F (38.3  C)  Resp: 18  SpO2: 95 %      GENERAL APPEARANCE:  The patient is a 59 year old well-developed, well-nourished individual that appears as stated age.  THROAT:  Oropharynx is clear.  No buccal or tongue lacerations present.  Voice is clear and without muffling.    NECK:  Supple.  Trachea is midline.  No carotid bruits present on auscultation.  LUNGS:  Breathing is easy.  Breath sounds are equal and clear bilaterally.  No wheezes, rhonchi, or rales.  HEART:  Regular rate and rhythm with normal S1 and S2.  No murmurs, gallops, or rubs.  NEUROLOGIC:  No focal sensory or motor deficits are noted.  Cranial nerves II through XII are intact.   PSYCHIATRIC:  The patient is awake, alert, and oriented x4.  Recent and remote memory is intact.  Appropriate mood and affect.  Calm and cooperative with history and physical exam.  SKIN:  Warm, dry, and well perfused.  Good turgor.  No lesions, nodules, or rashes are noted.  No bruising noted.      ED Course     ED Course as of 12/28/24 2044   Sat Dec 28, 2024   1813 In to see patient and history/physical completed.    1813 Labs and ECG ordered.   1821 1 L LR and prochlorperazine 10 mg IV ordered.   1842 Patient started getting chills.  Ketorolac 30 mg IV ordered.  Acetaminophen 975 p.o. ordered.   1900 Troponin T, High Sensitivity: 7  High-sensitivity troponin 7.  2-hour repeat ordered.   1909 Influenza A(!): Positive  Patient positive for influenza A.   2039 At this time no acute findings noted on labs or imaging other than positive influenza A.  ECG showed no acute abnormalities.  Patient is feeling better.  Will discharge home to do quarantining for flu and OTC treatment.  In regards to vasovagal syncope will discharge home on Zio patch study.  Close follow-up with PCP.  Return precautions given.            ECG:    ECG  competed at 1818 and personally reviewed at 1819 showing sinus rhythm with ventricular rate of 85 and QTc of 459.  Normal axis.  Incomplete right bundle branch block present.  Normal ECG.  When compared to ECG from 10/19/2023, ST/T wave abnormality in lateral leads has resolved.  New incomplete right bundle branch block present.         Results for orders placed or performed during the hospital encounter of 12/28/24 (from the past 24 hours)   EKG 12-lead, tracing only   Result Value Ref Range    Systolic Blood Pressure  mmHg    Diastolic Blood Pressure  mmHg    Ventricular Rate 85 BPM    Atrial Rate 85 BPM    IL Interval 140 ms    QRS Duration 90 ms     ms    QTc 459 ms    P Axis 59 degrees    R AXIS -10 degrees    T Axis 45 degrees    Interpretation ECG       Sinus rhythm  Normal ECG  When compared with ECG of 19-Oct-2023 11:13,  Nonspecific T wave abnormality, improved in Lateral leads     CBC with platelets   Result Value Ref Range    WBC Count 4.4 4.0 - 11.0 10e3/uL    RBC Count 4.96 4.40 - 5.90 10e6/uL    Hemoglobin 14.6 13.3 - 17.7 g/dL    Hematocrit 42.6 40.0 - 53.0 %    MCV 86 78 - 100 fL    MCH 29.4 26.5 - 33.0 pg    MCHC 34.3 31.5 - 36.5 g/dL    RDW 13.0 10.0 - 15.0 %    Platelet Count 157 150 - 450 10e3/uL   Comprehensive metabolic panel   Result Value Ref Range    Sodium 135 135 - 145 mmol/L    Potassium 3.9 3.4 - 5.3 mmol/L    Carbon Dioxide (CO2) 20 (L) 22 - 29 mmol/L    Anion Gap 14 7 - 15 mmol/L    Urea Nitrogen 13.8 8.0 - 23.0 mg/dL    Creatinine 1.20 (H) 0.67 - 1.17 mg/dL    GFR Estimate 70 >60 mL/min/1.73m2    Calcium 8.7 (L) 8.8 - 10.4 mg/dL    Chloride 101 98 - 107 mmol/L    Glucose 113 (H) 70 - 99 mg/dL    Alkaline Phosphatase 80 40 - 150 U/L    AST 46 (H) 0 - 45 U/L    ALT 31 0 - 70 U/L    Protein Total 7.0 6.4 - 8.3 g/dL    Albumin 4.3 3.5 - 5.2 g/dL    Bilirubin Total 0.3 <=1.2 mg/dL   Troponin T, High Sensitivity   Result Value Ref Range    Troponin T, High Sensitivity 7 <=22 ng/L    Magnesium   Result Value Ref Range    Magnesium 1.6 (L) 1.7 - 2.3 mg/dL   Nathalie Draw    Narrative    The following orders were created for panel order Nathalie Draw.  Procedure                               Abnormality         Status                     ---------                               -----------         ------                     Extra Blue Top Tube[[id number]]                              Final result               Extra Red Top Tube[[id number]]                               Final result               Extra Green Top (Lithium...[[id number]]                      Final result                 Please view results for these tests on the individual orders.   Extra Blue Top Tube   Result Value Ref Range    Hold Specimen JIC    Extra Red Top Tube   Result Value Ref Range    Hold Specimen JIC    Extra Green Top (Lithium Heparin) ON ICE   Result Value Ref Range    Hold Specimen OK    Influenza A/B, RSV and SARS-CoV2 PCR (COVID-19) Nasopharyngeal    Specimen: Nasopharyngeal; Swab   Result Value Ref Range    Influenza A PCR Positive (A) Negative    Influenza B PCR Negative Negative    RSV PCR Negative Negative    SARS CoV2 PCR Negative Negative    Narrative    Testing was performed using the Xpert Xpress CoV2/Flu/RSV Assay on the Cepheid GeneXpert Instrument. This test should be ordered for the detection of SARS-CoV2, influenza, and RSV viruses in individuals with signs and symptoms of respiratory tract infection. This test is for in vitro diagnostic use under the [place] FDA for laboratories certified under CLIA to perform high or moderate complexity testing. This test has been [place] FDA cleared. A negative result does not rule out the presence of PCR inhibitors in the specimen or target RNA in concentration below the limit of detection for the assay. If only one viral target is positive but coinfection with multiple targets is suspected, the sample should be re-tested with another FDA cleared, approved, or authorized test,  if coninfection would change clinical management. This test was validated by the [facility]. These laboratories are certified under the Clinical Laboratory Improvement Amendments of 1988 (CLIA-88) as qualified to perfom high complexity laboratory testing.   Troponin T, High Sensitivity   Result Value Ref Range    Troponin T, High Sensitivity 7 <=22 ng/L       Medications   acetaminophen (TYLENOL) tablet 975 mg (0 mg Oral Hold 12/28/24 1846)   prochlorperazine (COMPAZINE) injection 10 mg (10 mg Intravenous $Given 12/28/24 1831)   lactated ringers BOLUS 1,000 mL (1,000 mLs Intravenous $New Bag 12/28/24 1833)   ketorolac (TORADOL) injection 30 mg (30 mg Intravenous $Given 12/28/24 1845)       Assessments & Plan (with Medical Decision Making)     I have reviewed the nursing notes.    I have reviewed the findings, diagnosis, plan and need for follow up with the patient.    Summary:  Patient presents to the ER today for syncope.  Potential diagnosis which have been considered and evaluated include vasovagal, arrhythmia, orthostasis, CVA, intracerebral abnormality, as well as others. Many of these have been excluded using the various modalities and assessment as noted on the chart. At the present time, the diagnosis given seems to be the most likely vasovagal syncope, influenza A.  Upon arrival, vitals signs show blood pressure 128/75 with a pulse of 83.  Temperature 100.9  F.  Respirations 18 with oxygenation of 95% on room air.  The patient is alert and oriented at this time.  Neurological examination normal.  Cardiac and respiratory examination normal.  ECG obtained showing no acute abnormalities.  IV established and lab work obtained showing WBC of 4.4 with hemoglobin 14.6.  Electrolytes show sodium of 135 with potassium of 3.9, calcium of 8.7 and magnesium 1.6.  Renal function show BUN of 13.8 with a creatinine 1.20 and a GFR of 70.  Alk phos 80 with mild elevation of AST at 46 and ALT of 31.   Total bilirubin 0.3.  Influenza, COVID, RSV test came back positive for influenza A.  High-sensitivity troponin 7.  2-hour repeat high-sensitivity troponin 7 making ACS unlikely.  1 L LR given for hydration.  Prochlorperazine 10 mg IV given for nausea.  Ketorolac 30 mg IV and acetaminophen 975 p.o. given for fever and chills.  At this time no acute arrhythmia noted while patient in the ER.  Patient is doing better but is still having flulike symptoms.  Will discharge patient home to do OTC treatment for influenza A.  Acetaminophen/ibuprofen and hydration.  Will discharge patient home on Zio patch study for 2 weeks to still rule out arrhythmia as cause of syncope, but with patient having prodromes this was highly likely vasovagal syncope.  Advised patient to follow-up with PCP and return to ER if new or worsening symptoms.  Patient verbalized understand agrees with plan of care.  Patient discharged home.      Critical Care Time: None     Impression and plan discussed with patient. Questions answered, concerns addressed, indications for urgent re-evaluation reviewed, and  given. Patient/Parent/Caregiver agree with treatment plan and have no further questions at this time.  AVS provided at discharge.    This document was prepared using a combination of typing and voice generated software.  While every attempt was made for accuracy, spelling and grammatical errors may exist.              New Prescriptions    No medications on file       Final diagnoses:   Vasovagal syncope   Influenza A       12/28/2024   [facility]       Addy Amanda APRN CNP  12/28/24 2044

## 2024-12-29 NOTE — DISCHARGE INSTRUCTIONS
Influenza:    The flu (influenza) is a viral infection that affects your respiratory tract. The respiratory tract is made up of your mouth, nose, and lungs, and the passages between them. Unlike a cold, the flu can make you very ill. It may lead to pneumonia, a serious lung infection. The flu can have serious complications and even cause death.   Because of the COVID-19 pandemic, experts strongly advise getting a flu vaccine during 2145-1996 to protect yourself, your family, and others. Flu vaccines and COVID-19 vaccines can be given at the same time. Flu viruses and the COVID-19 virus are both likely to spread during flu season. A flu vaccine will help save medical resources to care for people with COVID-19. People at high risk for complications from the flu are also likely to be at high risk for serious problems from COVID-19, so it's important to get a flu vaccine.        Viruses that cause influenza spread through the air in droplets when someone who has the flu coughs, sneezes, laughs, or talks.      How does the flu spread?  The flu is caused by a virus. The virus spreads through the air in droplets when someone who has the flu coughs, sneezes, laughs, or talks. You can become infected when you inhale these viruses directly. You can also become infected when you touch a surface on which the droplets have landed and then transfer the germs to your eyes, nose, or mouth. Touching used tissues, or sharing utensils, drinking glasses, or a toothbrush from an infected person can expose you to flu viruses, too.     What are the symptoms of the flu?  Flu symptoms tend to come on quickly and may last a few days to a few weeks. They include:   Fever usually higher than 100.4  F  ( 38 C ) and chills  Sore throat and headache  Dry cough  Runny nose  Tiredness and weakness  Muscle aches    How is the flu treated?  The flu usually gets better after 7 days or so. In some cases, your healthcare provider may prescribe an  antiviral medicine. This may help you get well sooner. It also may reduce the risk for and severity of complications. For the medicine to help, you need to take it as soon as possible (ideally within 48 hours) after your symptoms start.   If you develop pneumonia or other serious illness, you may need to stay in the hospital.      Easing flu symptoms  During this time it is important to keep well hydrated with water, juices, or low calorie sports drinks.  Using 1/2 strength G2, Gatorade Zero, or PowerAde Zero is best choice (mix half and half with water).  DO NOT use caffeinated or alcoholic beverages for hydration, as these actually dehydrate you.   If your past medical conditions, allergies, current medications, or current status does not prevent you from using acetaminophen and/or ibuprofen, use the following: Acetaminophen (Tylenol) every 6 hours as needed for pain in addition to ibuprofen (Motrin) mg every 6 hours as needed for pain.  Take these two medications together if wanted.              Taking steps to protect others  Wash your hands often, especially after coughing or sneezing. Or clean your hands with an alcohol-based hand  containing at least 60% alcohol.  Cough or sneeze into a tissue. Then throw the tissue away and wash your hands. If you don t have a tissue, cough and sneeze into your elbow.  Stay home until at least 24 hours after you no longer have a fever or chills. Be sure the fever isn t being hidden by fever-reducing medicine.  Don t share food, utensils, drinking glasses, or a toothbrush with others.       Fever/pain control:   If your past medical conditions, allergies, current medications, or current status does not prevent you from using acetaminophen and/or ibuprofen, use the following: Acetaminophen 650-1000 mg every 6 hours as needed for pain/fever.  Ibuprofen 400-600 mg every 6 hours as needed for pain/fever.  These can be taken together if wanted.    Remember that these are  for AS NEEDED.  If not needed, do not take.  It is important to remember that fever is beneficial in the healing process.  It is usually recommended to start using medication if/when temperature is =100.4 F  or when you have discomfort.  Studies show that not using medication for fever control shows better outcomes...            Follow-up with your primary care provider for reevaluation.  Contact your primary care provider if you have any questions or concerns.  Do not hesitate to return to the ER if any new or worsening symptoms.     Please read the attached instructions (if any).  They highlight more specific treatments and interventions for you at home.              Thank you for letting me participate in your care and wish you a fast and uneventful recovery,    Addy NEWELL, CNP    Do not hesitate to contact me with questions or concerns.  [email or web address]

## 2024-12-30 LAB
ATRIAL RATE - MUSE: 85 BPM
DIASTOLIC BLOOD PRESSURE - MUSE: NORMAL MMHG
INTERPRETATION ECG - MUSE: NORMAL
P AXIS - MUSE: 59 DEGREES
PR INTERVAL - MUSE: 140 MS
QRS DURATION - MUSE: 90 MS
QT - MUSE: 386 MS
QTC - MUSE: 459 MS
R AXIS - MUSE: -10 DEGREES
SYSTOLIC BLOOD PRESSURE - MUSE: NORMAL MMHG
T AXIS - MUSE: 45 DEGREES
VENTRICULAR RATE- MUSE: 85 BPM

## 2025-01-07 ENCOUNTER — TRANSFERRED RECORDS (OUTPATIENT)
Dept: HEALTH INFORMATION MANAGEMENT | Facility: CLINIC | Age: 60
End: 2025-01-07

## 2025-01-09 ENCOUNTER — MEDICAL CORRESPONDENCE (OUTPATIENT)
Dept: MRI IMAGING | Facility: HOSPITAL | Age: 60
End: 2025-01-09

## 2025-01-11 ENCOUNTER — HEALTH MAINTENANCE LETTER (OUTPATIENT)
Age: 60
End: 2025-01-11

## 2025-01-15 NOTE — PROGRESS NOTES
"  Assessment & Plan     1. Hyperlipidemia, unspecified hyperlipidemia type (Primary)  Labs updated, will make medication adjustments as needed.  - Lipid Profile; Future  - ALT; Future  - Lipid Profile  - ALT    2. Idiopathic chronic gout of multiple sites without tophus  As above  - Uric acid; Future  - CBC with platelets; Future  - Uric acid  - CBC with platelets    3. Screening for prostate cancer  Screening labs ordered  - PSA, screen; Future  - PSA, screen       The longitudinal plan of care for the diagnosis(es)/condition(s) as documented were addressed during this visit. Due to the added complexity in care, I will continue to support Mikey in the subsequent management and with ongoing continuity of care.     Return in about 6 months (around 7/20/2025) for Chronic Disease Management, Medication review.      Subjective   Mikey is a 59 year old, presenting for the following health issues:  Lipids and Derm Problem    HPI     Hyperlipidemia Follow-Up    Are you regularly taking any medication or supplement to lower your cholesterol?   Yes- lipitor  Are you having muscle aches or other side effects that you think could be caused by your cholesterol lowering medication?  No      Patient has not had gout symptoms for a number of years.  He is stable on medication (allopurinol).          Review of Systems  Constitutional, HEENT, cardiovascular, pulmonary, gi and gu systems are negative, except as otherwise noted.      Objective    /76 (BP Location: Left arm, Patient Position: Sitting, Cuff Size: Adult Regular)   Pulse 63   Temp (!) 96.1  F (35.6  C) (Tympanic)   Ht 1.803 m (5' 11\")   Wt 99.2 kg (218 lb 12.8 oz)   SpO2 99%   BMI 30.52 kg/m    Body mass index is 30.52 kg/m .  Physical Exam   GENERAL: alert and no distress  PSYCH: mentation appears normal, affect normal/bright          Signed Electronically by: Anai Le MD    "

## 2025-01-20 ENCOUNTER — OFFICE VISIT (OUTPATIENT)
Dept: FAMILY MEDICINE | Facility: OTHER | Age: 60
End: 2025-01-20
Attending: FAMILY MEDICINE
Payer: COMMERCIAL

## 2025-01-20 VITALS
TEMPERATURE: 96.1 F | HEART RATE: 63 BPM | WEIGHT: 218.8 LBS | OXYGEN SATURATION: 99 % | SYSTOLIC BLOOD PRESSURE: 126 MMHG | HEIGHT: 71 IN | BODY MASS INDEX: 30.63 KG/M2 | DIASTOLIC BLOOD PRESSURE: 76 MMHG

## 2025-01-20 DIAGNOSIS — Z12.5 SCREENING FOR PROSTATE CANCER: ICD-10-CM

## 2025-01-20 DIAGNOSIS — M1A.09X0 IDIOPATHIC CHRONIC GOUT OF MULTIPLE SITES WITHOUT TOPHUS: ICD-10-CM

## 2025-01-20 DIAGNOSIS — E78.5 HYPERLIPIDEMIA, UNSPECIFIED HYPERLIPIDEMIA TYPE: Primary | ICD-10-CM

## 2025-01-20 LAB
ALT SERPL W P-5'-P-CCNC: 24 U/L (ref 0–70)
CHOLEST SERPL-MCNC: 109 MG/DL
ERYTHROCYTE [DISTWIDTH] IN BLOOD BY AUTOMATED COUNT: 13 % (ref 10–15)
FASTING STATUS PATIENT QL REPORTED: NO
HCT VFR BLD AUTO: 44.2 % (ref 40–53)
HDLC SERPL-MCNC: 26 MG/DL
HGB BLD-MCNC: 15.3 G/DL (ref 13.3–17.7)
LDLC SERPL CALC-MCNC: 31 MG/DL
MCH RBC QN AUTO: 29.7 PG (ref 26.5–33)
MCHC RBC AUTO-ENTMCNC: 34.6 G/DL (ref 31.5–36.5)
MCV RBC AUTO: 86 FL (ref 78–100)
NONHDLC SERPL-MCNC: 83 MG/DL
PLATELET # BLD AUTO: 192 10E3/UL (ref 150–450)
PSA SERPL DL<=0.01 NG/ML-MCNC: 2.82 NG/ML (ref 0–3.5)
RBC # BLD AUTO: 5.16 10E6/UL (ref 4.4–5.9)
TRIGL SERPL-MCNC: 262 MG/DL
URATE SERPL-MCNC: 5.1 MG/DL (ref 3.4–7)
WBC # BLD AUTO: 5.2 10E3/UL (ref 4–11)

## 2025-01-20 PROCEDURE — 36415 COLL VENOUS BLD VENIPUNCTURE: CPT | Performed by: FAMILY MEDICINE

## 2025-01-20 PROCEDURE — 85027 COMPLETE CBC AUTOMATED: CPT | Performed by: FAMILY MEDICINE

## 2025-01-20 PROCEDURE — 80061 LIPID PANEL: CPT | Performed by: FAMILY MEDICINE

## 2025-01-20 PROCEDURE — 99214 OFFICE O/P EST MOD 30 MIN: CPT | Performed by: FAMILY MEDICINE

## 2025-01-20 PROCEDURE — G0103 PSA SCREENING: HCPCS | Performed by: FAMILY MEDICINE

## 2025-01-20 PROCEDURE — 84460 ALANINE AMINO (ALT) (SGPT): CPT | Performed by: FAMILY MEDICINE

## 2025-01-20 PROCEDURE — G2211 COMPLEX E/M VISIT ADD ON: HCPCS | Performed by: FAMILY MEDICINE

## 2025-01-20 PROCEDURE — 84550 ASSAY OF BLOOD/URIC ACID: CPT | Performed by: FAMILY MEDICINE

## 2025-01-20 ASSESSMENT — PAIN SCALES - GENERAL: PAINLEVEL_OUTOF10: MILD PAIN (3)

## 2025-02-10 DIAGNOSIS — R94.31 ABNORMAL PATIENT-ACTIVATED CARDIAC EVENT MONITOR: ICD-10-CM

## 2025-02-10 DIAGNOSIS — R55 SYNCOPE, UNSPECIFIED SYNCOPE TYPE: Primary | ICD-10-CM

## 2025-02-14 ENCOUNTER — TRANSFERRED RECORDS (OUTPATIENT)
Dept: HEALTH INFORMATION MANAGEMENT | Facility: CLINIC | Age: 60
End: 2025-02-14

## 2025-02-18 ENCOUNTER — OFFICE VISIT (OUTPATIENT)
Dept: CARDIOLOGY | Facility: OTHER | Age: 60
End: 2025-02-18
Attending: NURSE PRACTITIONER
Payer: COMMERCIAL

## 2025-02-18 VITALS
SYSTOLIC BLOOD PRESSURE: 138 MMHG | DIASTOLIC BLOOD PRESSURE: 78 MMHG | HEART RATE: 95 BPM | RESPIRATION RATE: 16 BRPM | HEIGHT: 71 IN | BODY MASS INDEX: 29.82 KG/M2 | WEIGHT: 213 LBS | OXYGEN SATURATION: 97 %

## 2025-02-18 DIAGNOSIS — R94.31 ABNORMAL PATIENT-ACTIVATED CARDIAC EVENT MONITOR: ICD-10-CM

## 2025-02-18 DIAGNOSIS — R55 SYNCOPE, UNSPECIFIED SYNCOPE TYPE: Primary | ICD-10-CM

## 2025-02-18 NOTE — PATIENT INSTRUCTIONS
Thank you for allowing Saniya Carmichael CNP and our  team to participate in your care. Please call our office at [phone] with scheduling questions or if you need to cancel or change your appointment. With any other questions or concerns you may call cardiology nurse at  [phone].       If you experience chest pain, chest pressure, chest tightness, shortness of breath, fainting, lightheadedness, nausea, vomiting, or other concerning symptoms, please report to the Emergency Department or call 911. These symptoms may be emergent, and best treated in the Emergency Department.

## 2025-02-18 NOTE — PROGRESS NOTES
"  [facility] HEART CARE   CARDIOLOGY CONSULT     Mikey Jenkins   [address]  [place] [facility] [id number]      Anai Le A     Chief Complaint   Patient presents with    Consult        HPI:   Mikey Jenkins is a pleasant 59-year old male who presents for cardiology consult to review recent zio patch results. He has a cardiovascular history including hyperlipidemia. He has a non-cardiac medical history including TIERRA with intolerance to CPAP, insomnia, gout, mild cognitive impairment, ED.     Mr. Jenkins was evaluated in the ED on 12/28/2024 after syncopal event at a basketball game. He had been refereeing a game. When the game ended he stayed to watch his grand=daughters game.   He was having significant knee pain due to a meniscus injury and endorses that he started to feel unwell due to this.   He did have prodromal symptoms of lightheadedness and could feel that he was going to pass out prior to episode of syncope.   He estimates being out for seconds.   When he came to all he could feel was \"the pain, it was unbearable, it was just throbbing in there.\" No headache, no seizure-like activity.  No history of syncope.     Today, Mikey endorses that he thinks he had the episode of passing out due to such intense pain he was experiencing in his knee.   He denies any recurrence of symptoms.   He is schedule to have knee surgery coming up in March.   He stays fairly busy and active. No limiting chest pain or pressure, dyspnea or dyspnea on exertion.   No episodes of racing, skipping, fluttering in his chest.   He does endorse that he probably does not drink enough water. He was drinking a lot of soda but has cut back. Drinks three 12 ounce sodas daily at this time. As cut back - previously drank two 16 ounce sodas, three 12 ounce cans daily.   Eats standard american diet- subs, out to eat.       Smoking History: Lifelong non-tobacco user    Alcohol History: " None    Substance Abuse History: None    Sleep History: TIERRA- can't wear CPAP, up 3 times per night at least.     Family History:   - No significant family history      RELEVANT TESTING:  Zio Patch 2025  Conclusion  Zio XT patch report on Mikey Jenkins.  Ordered secondary to syncope.   Worn for 13 days and 12 hr's.  After removing artifact, total time was 13 days and 11 hr's. Placed on 2025 at 9:21 PM and completed on February 3, 2025 at 9:07 AM.     Underlying rhythm was sinus.   Hrt rate ranged from 45 bpm, maximum heart rate of 174 bpm, averaging 69 bpm.   No significant bradycardia, pauses, Mobitz type II or 3rd degree heart block.   No atrial fibrillation on this study.   x 0 triggered events and x 0 diary entries.    x 0 runs of VT.     x 16 runs of SVT longest lasting 10.2 seconds with a maximum heart rate of 174 bpm.  Some episodes of supraventricular tachycardia may be atrial tachycardia with variable block.   Rare, <1% of PAC's, atrial couplets, atrial triplets, and PVC's.   + episodes of ventricular bigeminy lasting up to 7 sec's.   + episodes of ventricular trigeminy lasting up to 10.9 sec's.      Jakob Pinon, DO      CT Calcium Screen 2023  CORONARY CALCIUM SCORE:     Left Main:                            0.0  Left anterior descendin.7  Left Circumflex:                  0.0  Right coronary artery:        5.6     TOTAL:                               7.2      PAST MEDICAL HISTORY:   Past Medical History:   Diagnosis Date    Arthropathy 2013    Erectile dysfunction, unspecified erectile dysfunction type 2018    Gout, unspecified 12/3/2010    Hyperlipidemia, unspecified hyperlipidemia type 2016    Knee joint effusion 2009    Male hypogonadism 2018    Unspecified hearing loss 12/3/2010          FAMILY HISTORY:   Family History   Problem Relation Age of Onset    C.A.D. Father     Dementia Father     Arthritis Brother         gout    Thyroid Disease Brother            PAST SURGICAL HISTORY:   Past Surgical History:   Procedure Laterality Date    circumcision      COLONOSCOPY N/A 05/04/2023    Procedure: COLONOSCOPY WITH POLYPECTOMY;  Surgeon: Han Britton MD;  Location: [facility] OR    HERNIA REPAIR      umbilical    inguinal hernia repair  01/01/1987    KNEE ARTHROSCOPY W/ DEBRIDEMENT Left     JHOAN PROCEDURE N/A 10/31/2023    Procedure: PLICATION, PENIS;  Surgeon: Rinku Fitzpatrick MD;  Location: [facility] OR    ORTHOPEDIC SURGERY      right ankle    vasectomy      ZZC ORAL SURGERY PROCEDURE Left     wisdom tooth scaped           SOCIAL HISTORY:   Social History     Socioeconomic History    Marital status:      Spouse name: None    Number of children: None    Years of education: None    Highest education level: None   Tobacco Use    Smoking status: Never     Passive exposure: Never    Smokeless tobacco: Never   Vaping Use    Vaping status: Never Used   Substance and Sexual Activity    Alcohol use: Yes     Comment: 4 beers, weekly    Drug use: No    Sexual activity: Yes     Partners: Female     Birth control/protection: None   Other Topics Concern    Blood Transfusions Yes    Caffeine Concern Yes     Comment: Coffe, soda; 32 oz daily    Exercise Yes     Comment: walking    Parent/sibling w/ CABG, MI or angioplasty before 65F 55M? No     Social Drivers of Health     Financial Resource Strain: Low Risk  (4/1/2024)    Financial Resource Strain     Within the past 12 months, have you or your family members you live with been unable to get utilities (heat, electricity) when it was really needed?: No   Food Insecurity: Low Risk  (4/1/2024)    Food Insecurity     Within the past 12 months, did you worry that your food would run out before you got money to buy more?: No     Within the past 12 months, did the food you bought just not last and you didn t have money to get more?: No   Transportation Needs: Low Risk  (4/1/2024)    Transportation Needs     Within the past  "12 months, has lack of transportation kept you from medical appointments, getting your medicines, non-medical meetings or appointments, work, or from getting things that you need?: No   Interpersonal Safety: Low Risk  (8/21/2024)    Interpersonal Safety     Do you feel physically and emotionally safe where you currently live?: Yes     Within the past 12 months, have you been hit, slapped, kicked or otherwise physically hurt by someone?: No     Within the past 12 months, have you been humiliated or emotionally abused in other ways by your partner or ex-partner?: No   Housing Stability: Low Risk  (4/1/2024)    Housing Stability     Do you have housing? : Yes     Are you worried about losing your housing?: No          CURRENT MEDICATIONS:   Current Outpatient Medications   Medication Sig Dispense Refill    allopurinol (ZYLOPRIM) 300 MG tablet TAKE 1 TABLET(300 MG) BY MOUTH DAILY 90 tablet 0    atorvastatin (LIPITOR) 40 MG tablet TAKE 1 TABLET(40 MG) BY MOUTH DAILY 90 tablet 0    colchicine (COLCRYS) 0.6 MG tablet Take 2 tablets at the first sign of flare, take 1 additional tablet one hour later. 9 tablet 0    ibuprofen (ADVIL/MOTRIN) 800 MG tablet Take 1 tablet (800 mg) by mouth 3 times daily as needed for mild pain 100 tablet 1    indomethacin (INDOCIN) 25 MG capsule Take 1-2 capsules (25-50 mg) by mouth 3 times daily as needed (gout) 30 capsule 2    sildenafil (REVATIO) 20 MG tablet Take 3-5 (60-100mg) tablets by mouth 1 hour prior to sexual activity 100 tablet 11    sodium chloride (OCEAN) 0.65 % nasal spray Spray 2 sprays in nostril 2 times daily as needed (nose bleeds) 15 mL 0    traZODone (DESYREL) 50 MG tablet Take 1 tablet (50 mg) by mouth at bedtime (Patient not taking: Reported on 2/18/2025)       No current facility-administered medications for this visit.         ALLERGIES:   No Known Allergies       PHYSICAL EXAM:     Vitals: /78   Pulse 95   Resp 16   Ht 1.803 m (5' 11\")   Wt 96.6 kg (213 lb)   " SpO2 97%   BMI 29.71 kg/m    BMI= Body mass index is 29.71 kg/m .    GENERAL: The patient is a well-developed, well-nourished, in no apparent distress.  HEENT: Head is normocephalic and atraumatic.   NECK: Supple. Carotid upstroke are normal bilaterally, no cervical bruits, JVP not visible.   CHEST/ LUNGS: Lungs clear to auscultation, no rales, rhonchi or wheezes, no use of accessory muscles, no retractions, respirations unlabored and normal respiratory rate.   CARDIO: Regular rate and rhythm normal with S1 and S2, no S3 or S4 and no murmur, click or rub. Precordium quiet with normal PMI.    ABD: Abdomen is soft   EXTREMITIES: No lower extremity edema present. Peripheral pulses normal and equal bilaterally.  VASC: Radial,posterior tibialis pulses normal and symmetric   NEUROLOGIC: Alert and oriented X3. Normal speech  SKIN: No jaundice      TEST RESULTS:   Results for orders placed or performed in visit on 02/18/25   EKG 12-lead complete w/read - (Clinic Performed)     Status: None (Preliminary result)   Result Value Ref Range    Systolic Blood Pressure  mmHg    Diastolic Blood Pressure  mmHg    Ventricular Rate 63 BPM    Atrial Rate 63 BPM    NV Interval 124 ms    QRS Duration 86 ms     ms    QTc 419 ms    P Axis 63 degrees    R AXIS -12 degrees    T Axis 70 degrees    Interpretation ECG       Sinus rhythm  Nonspecific ST and T wave abnormality  Abnormal ECG  When compared with ECG of 28-Dec-2024 18:18,  No significant change was found           ASSESSMENT:   Mikey Jnekins is a pleasant 59-year old male who presents for cardiology consult to review recent zio patch results. He has a cardiovascular history including hyperlipidemia. He has a non-cardiac medical history including TIERRA with intolerance to CPAP, insomnia, gout, mild cognitive impairment, ED.       PLAN:   Syncopal events  Paroxysmal supraventricular tachycardia  Reviewed ED visit 12/2024 for syncopal events. No history of syncope and no recurrent  syncopal event.   Reviewed zio patch results 12/2024- no sustained tachyarrhythmias, bradyarrhythmia, high grade heart block. He had some short, non-sustained episodes of SVT, possible atrial tachycardia. These episodes available for review on report look to have a p-wave and not concerning for atrial fibrillation. He is overall asymptomatic.   He stays active and has no exertional symptoms that limit him. Given symptoms of intense pain as well as acute illness (influenza A) surrounding syncopal event with prodromal symptoms this sounds like an episode of vasovagal syncope. No further cardiovascular work-up at this time. If there are recurrent episodes consider echocardiogram, MCOT.       Follow-up with cardiology on an as needed basis, certainly sooner with acute concerns.     Thank you for allowing me to participate in the care of your patient. Please do not hesitate to contact me if you have any questions.     Saniya Carmichael, CNP

## 2025-02-19 LAB
ATRIAL RATE - MUSE: 63 BPM
DIASTOLIC BLOOD PRESSURE - MUSE: NORMAL MMHG
INTERPRETATION ECG - MUSE: NORMAL
P AXIS - MUSE: 63 DEGREES
PR INTERVAL - MUSE: 124 MS
QRS DURATION - MUSE: 86 MS
QT - MUSE: 410 MS
QTC - MUSE: 419 MS
R AXIS - MUSE: -12 DEGREES
SYSTOLIC BLOOD PRESSURE - MUSE: NORMAL MMHG
T AXIS - MUSE: 70 DEGREES
VENTRICULAR RATE- MUSE: 63 BPM

## 2025-02-21 ENCOUNTER — OFFICE VISIT (OUTPATIENT)
Dept: FAMILY MEDICINE | Facility: OTHER | Age: 60
End: 2025-02-21
Attending: FAMILY MEDICINE
Payer: COMMERCIAL

## 2025-02-21 VITALS
HEART RATE: 72 BPM | WEIGHT: 211.6 LBS | BODY MASS INDEX: 29.51 KG/M2 | DIASTOLIC BLOOD PRESSURE: 72 MMHG | RESPIRATION RATE: 18 BRPM | OXYGEN SATURATION: 96 % | SYSTOLIC BLOOD PRESSURE: 130 MMHG | TEMPERATURE: 97.5 F

## 2025-02-21 DIAGNOSIS — E78.5 HYPERLIPIDEMIA, UNSPECIFIED HYPERLIPIDEMIA TYPE: ICD-10-CM

## 2025-02-21 DIAGNOSIS — M25.561 CHRONIC PAIN OF RIGHT KNEE: ICD-10-CM

## 2025-02-21 DIAGNOSIS — D22.30 CHANGE IN FACIAL MOLE: ICD-10-CM

## 2025-02-21 DIAGNOSIS — Z01.818 PRE-OPERATIVE GENERAL PHYSICAL EXAMINATION: Primary | ICD-10-CM

## 2025-02-21 DIAGNOSIS — G89.29 CHRONIC PAIN OF RIGHT KNEE: ICD-10-CM

## 2025-02-21 LAB
ANION GAP SERPL CALCULATED.3IONS-SCNC: 12 MMOL/L (ref 7–15)
BUN SERPL-MCNC: 12.5 MG/DL (ref 8–23)
CALCIUM SERPL-MCNC: 9.7 MG/DL (ref 8.8–10.4)
CHLORIDE SERPL-SCNC: 104 MMOL/L (ref 98–107)
CREAT SERPL-MCNC: 1.14 MG/DL (ref 0.67–1.17)
EGFRCR SERPLBLD CKD-EPI 2021: 74 ML/MIN/1.73M2
ERYTHROCYTE [DISTWIDTH] IN BLOOD BY AUTOMATED COUNT: 13.1 % (ref 10–15)
GLUCOSE SERPL-MCNC: 126 MG/DL (ref 70–99)
HCO3 SERPL-SCNC: 25 MMOL/L (ref 22–29)
HCT VFR BLD AUTO: 45.8 % (ref 40–53)
HGB BLD-MCNC: 15.6 G/DL (ref 13.3–17.7)
MCH RBC QN AUTO: 29.4 PG (ref 26.5–33)
MCHC RBC AUTO-ENTMCNC: 34.1 G/DL (ref 31.5–36.5)
MCV RBC AUTO: 86 FL (ref 78–100)
PLATELET # BLD AUTO: 196 10E3/UL (ref 150–450)
POTASSIUM SERPL-SCNC: 4.2 MMOL/L (ref 3.4–5.3)
RBC # BLD AUTO: 5.31 10E6/UL (ref 4.4–5.9)
SODIUM SERPL-SCNC: 141 MMOL/L (ref 135–145)
WBC # BLD AUTO: 5 10E3/UL (ref 4–11)

## 2025-02-21 PROCEDURE — 36415 COLL VENOUS BLD VENIPUNCTURE: CPT | Performed by: FAMILY MEDICINE

## 2025-02-21 PROCEDURE — 85027 COMPLETE CBC AUTOMATED: CPT | Performed by: FAMILY MEDICINE

## 2025-02-21 PROCEDURE — 80048 BASIC METABOLIC PNL TOTAL CA: CPT | Performed by: FAMILY MEDICINE

## 2025-02-21 PROCEDURE — G2211 COMPLEX E/M VISIT ADD ON: HCPCS | Performed by: FAMILY MEDICINE

## 2025-02-21 PROCEDURE — 99214 OFFICE O/P EST MOD 30 MIN: CPT | Performed by: FAMILY MEDICINE

## 2025-02-21 ASSESSMENT — PAIN SCALES - GENERAL: PAINLEVEL_OUTOF10: NO PAIN (0)

## 2025-02-21 NOTE — PATIENT INSTRUCTIONS
Antiplatelet or Anticoagulation Medication Instructions  Hold all ASA/NSAIDs/Vitamins/Supplements 7 days prior to procedure  You may still take Tylenol if needed    Additional Medication Instructions  No other medications the morning of surgery, may resume medications after surgery

## 2025-02-21 NOTE — PROGRESS NOTES
Preoperative Evaluation  [facility]  [address]  [facility] [address]  Phone: [phone]  Primary Provider: Anai Le MD  Pre-op Performing Provider: Anai Le MD  Feb 21, 2025 2/20/2025   Surgical Information   What procedure is being done? Right Knee Arthroscopy; Partial Medial Meniscectomy   Facility or Hospital where procedure/surgery will be performed: [facility]   Who is doing the procedure / surgery? Dr. Prasanna Paulino   Date of surgery / procedure: 3/6/2025   Time of surgery / procedure: TBD   Where do you plan to recover after surgery? at home with family     Fax number for surgical facility: on file    Assessment & Plan     The proposed surgical procedure is considered INTERMEDIATE risk.      ICD-10-CM    1. Pre-operative general physical examination  Z01.818 CBC with platelets     Basic metabolic panel     CBC with platelets     Basic metabolic panel      2. Chronic pain of right knee  M25.561     G89.29       3. Hyperlipidemia, unspecified hyperlipidemia type  E78.5 Basic metabolic panel     Basic metabolic panel      4. Change in facial mole  D22.30 Adult Gen Surg  Referral             Possible Sleep Apnea: Monitor symptoms         - No identified additional risk factors other than previously addressed    Antiplatelet or Anticoagulation Medication Instructions  Hold all ASA/NSAIDs/Vitamins/Supplements 7-10 days prior to procedure      Additional Medication Instructions  No other medications the morning of surgery, may resume medications after surgery    Recommendation  Approval given to proceed with proposed procedure, without further diagnostic evaluation.    The longitudinal plan of care for the diagnosis(es)/condition(s) as documented were addressed during this visit. Due to the added complexity in care, I will continue to support Mikey in the subsequent management and with ongoing continuity of  care.        Juan Pablo Jensen is a 59 year old, presenting for the following:  Pre-Op Exam        HPI related to upcoming procedure: Chronic right knee pain        2/20/2025   Pre-Op Questionnaire   Have you ever had a heart attack or stroke? No   Have you ever had surgery on your heart or blood vessels, such as a stent placement, a coronary artery bypass, or surgery on an artery in your head, neck, heart, or legs? No   Do you have chest pain with activity? No   Do you have a history of heart failure? No   Do you currently have a cold, bronchitis or symptoms of other infection? No   Do you have a cough, shortness of breath, or wheezing? No   Do you or anyone in your family have previous history of blood clots? No   Do you or does anyone in your family have a serious bleeding problem such as prolonged bleeding following surgeries or cuts? No   Have you ever had problems with anemia or been told to take iron pills? No   Have you had any abnormal blood loss such as black, tarry or bloody stools? No   Have you ever had a blood transfusion? No   Are you willing to have a blood transfusion if it is medically needed before, during, or after your surgery? Yes   Have you or any of your relatives ever had problems with anesthesia? No   Do you have sleep apnea, excessive snoring or daytime drowsiness? (!) YES   Do you have a CPAP machine? Yes   Do you have any artifical heart valves or other implanted medical devices like a pacemaker, defibrillator, or continuous glucose monitor? No   Do you have artificial joints? No   Are you allergic to latex? No     Health Care Directive  Patient does not have a Health Care Directive: Discussed advance care planning with patient; however, patient declined at this time.    Preoperative Review of    reviewed - no record of controlled substances prescribed.      Status of Chronic Conditions:  HYPERLIPIDEMIA - Patient has a long history of significant Hyperlipidemia requiring  medication for treatment with recent good control. Patient reports no problems or side effects with the medication.     Patient Active Problem List    Diagnosis Date Noted    Mild cognitive impairment 04/05/2024     Priority: Medium    Peyronie's disease 07/26/2023     Priority: Medium    Erectile dysfunction, unspecified erectile dysfunction type 06/28/2018     Priority: Medium    Male hypogonadism 06/28/2018     Priority: Medium    Hyperlipidemia, unspecified hyperlipidemia type 11/07/2016     Priority: Medium    ACP (advance care planning) 05/10/2016     Priority: Medium     Advance Care Planning 5/10/2016: ACP Review of Chart / Resources Provided:  Reviewed chart for advance care plan.  Mikey CANSECO Deloriss has no plan or code status on file. Discussed available resources and provided with information. Confirmed code status reflects current choices pending further ACP discussions.  Confirmed/documented legally designated decision makers.  Added by Susan Monae          Gout, chronic 09/09/2013     Priority: Medium    Arthropathy 08/01/2013     Priority: Medium    Hearing loss 12/03/2010     Priority: Medium    Knee joint effusion 02/09/2009     Priority: Medium      Past Medical History:   Diagnosis Date    Arthropathy 8/1/2013    Erectile dysfunction, unspecified erectile dysfunction type 6/28/2018    Gout, unspecified 12/3/2010    Hyperlipidemia, unspecified hyperlipidemia type 11/7/2016    Knee joint effusion 2/9/2009    Male hypogonadism 6/28/2018    Unspecified hearing loss 12/3/2010     Past Surgical History:   Procedure Laterality Date    circumcision      COLONOSCOPY N/A 05/04/2023    Procedure: COLONOSCOPY WITH POLYPECTOMY;  Surgeon: Han Britton MD;  Location: [facility] OR    HERNIA REPAIR      umbilical    inguinal hernia repair  01/01/1987    KNEE ARTHROSCOPY W/ DEBRIDEMENT Left     JHOAN PROCEDURE N/A 10/31/2023    Procedure: PLICATION, PENIS;  Surgeon: Rinku Fitzpatrick MD;  Location: [facility] OR     "ORTHOPEDIC SURGERY      right ankle    vasectomy      [facility] ORAL SURGERY PROCEDURE Left     wisdom tooth scaped      Current Outpatient Medications   Medication Sig Dispense Refill    allopurinol (ZYLOPRIM) 300 MG tablet TAKE 1 TABLET(300 MG) BY MOUTH DAILY 90 tablet 0    atorvastatin (LIPITOR) 40 MG tablet TAKE 1 TABLET(40 MG) BY MOUTH DAILY 90 tablet 0    colchicine (COLCRYS) 0.6 MG tablet Take 2 tablets at the first sign of flare, take 1 additional tablet one hour later. 9 tablet 0    ibuprofen (ADVIL/MOTRIN) 800 MG tablet Take 1 tablet (800 mg) by mouth 3 times daily as needed for mild pain 100 tablet 1    indomethacin (INDOCIN) 25 MG capsule Take 1-2 capsules (25-50 mg) by mouth 3 times daily as needed (gout) 30 capsule 2    sildenafil (REVATIO) 20 MG tablet Take 3-5 (60-100mg) tablets by mouth 1 hour prior to sexual activity 100 tablet 11    sodium chloride (OCEAN) 0.65 % nasal spray Spray 2 sprays in nostril 2 times daily as needed (nose bleeds) 15 mL 0    traZODone (DESYREL) 50 MG tablet Take 50 mg by mouth at bedtime.         No Known Allergies     Social History     Tobacco Use    Smoking status: Never     Passive exposure: Never    Smokeless tobacco: Never   Substance Use Topics    Alcohol use: Yes     Comment: 4 beers, weekly     Family History   Problem Relation Age of Onset    C.A.D. Father     Dementia Father     Arthritis Brother         gout    Thyroid Disease Brother      History   Drug Use No             Review of Systems  Constitutional, HEENT, cardiovascular, pulmonary, gi and gu systems are negative, except as otherwise noted.    Objective    /72 (BP Location: Left arm, Patient Position: Sitting, Cuff Size: Adult Regular)   Pulse 72   Temp 97.5  F (36.4  C) (Tympanic)   Resp 18   Wt 96 kg (211 lb 9.6 oz)   SpO2 96%   BMI 29.51 kg/m     Estimated body mass index is 29.51 kg/m  as calculated from the following:    Height as of 2/18/25: 1.803 m (5' 11\").    Weight as of this encounter: " 96 kg (211 lb 9.6 oz).  Physical Exam  GENERAL: alert and no distress  EYES: Eyes grossly normal to inspection, PERRL and conjunctivae and sclerae normal  HENT: ear canals and TM's normal, nose and mouth without ulcers or lesions  NECK: no adenopathy  RESP: lungs clear to auscultation - no rales, rhonchi or wheezes  CV: regular rates and rhythm, normal S1 S2, no S3 or S4, and no murmur, click or rub  MS: no gross musculoskeletal defects noted, no edema  SKIN: changing dark mole along left temple area  NEURO: Normal strength and tone, mentation intact and speech normal  PSYCH: mentation appears normal, affect normal/bright    Recent Labs   Lab Test 01/20/25  1610 12/28/24  1819   HGB 15.3 14.6    157   NA  --  135   POTASSIUM  --  3.9   CR  --  1.20*        Diagnostics  Recent Results (from the past week)   EKG 12-lead complete w/read - (Clinic Performed)    Collection Time: 02/18/25 10:01 AM   Result Value Ref Range    Systolic Blood Pressure  mmHg    Diastolic Blood Pressure  mmHg    Ventricular Rate 63 BPM    Atrial Rate 63 BPM    KY Interval 124 ms    QRS Duration 86 ms     ms    QTc 419 ms    P Axis 63 degrees    R AXIS -12 degrees    T Axis 70 degrees    Interpretation ECG       Sinus rhythm  Nonspecific ST and T wave abnormality  Left axis deviation  When compared with ECG of 28-Dec-2024 18:18,  No significant change was found  Confirmed by MD STOVALL DANIEL ([id number]) on 2/19/2025 8:46:09 AM     CBC with platelets    Collection Time: 02/21/25  2:33 PM   Result Value Ref Range    WBC Count 5.0 4.0 - 11.0 10e3/uL    RBC Count 5.31 4.40 - 5.90 10e6/uL    Hemoglobin 15.6 13.3 - 17.7 g/dL    Hematocrit 45.8 40.0 - 53.0 %    MCV 86 78 - 100 fL    MCH 29.4 26.5 - 33.0 pg    MCHC 34.1 31.5 - 36.5 g/dL    RDW 13.1 10.0 - 15.0 %    Platelet Count 196 150 - 450 10e3/uL   Basic metabolic panel    Collection Time: 02/21/25  2:33 PM   Result Value Ref Range    Sodium 141 135 - 145 mmol/L    Potassium 4.2 3.4 -  5.3 mmol/L    Chloride 104 98 - 107 mmol/L    Carbon Dioxide (CO2) 25 22 - 29 mmol/L    Anion Gap 12 7 - 15 mmol/L    Urea Nitrogen 12.5 8.0 - 23.0 mg/dL    Creatinine 1.14 0.67 - 1.17 mg/dL    GFR Estimate 74 >60 mL/min/1.73m2    Calcium 9.7 8.8 - 10.4 mg/dL    Glucose 126 (H) 70 - 99 mg/dL      No EKG this visit, completed in the last 90 days.    Revised Cardiac Risk Index (RCRI)  The patient has the following serious cardiovascular risks for perioperative complications:   - No serious cardiac risks = 0 points     RCRI Interpretation: 0 points: Class I (very low risk - 0.4% complication rate)         Signed Electronically by: Anai Le MD  A copy of this evaluation report is provided to the requesting physician.

## 2025-03-11 ENCOUNTER — TRANSFERRED RECORDS (OUTPATIENT)
Dept: HEALTH INFORMATION MANAGEMENT | Facility: CLINIC | Age: 60
End: 2025-03-11

## 2025-04-08 ENCOUNTER — TRANSFERRED RECORDS (OUTPATIENT)
Dept: HEALTH INFORMATION MANAGEMENT | Facility: HOSPITAL | Age: 60
End: 2025-04-08

## 2025-04-17 DIAGNOSIS — G89.4 CHRONIC PAIN SYNDROME: ICD-10-CM

## 2025-04-17 RX ORDER — IBUPROFEN 800 MG/1
TABLET, FILM COATED ORAL
Qty: 100 TABLET | Refills: 1 | Status: SHIPPED | OUTPATIENT
Start: 2025-04-17

## 2025-04-17 NOTE — TELEPHONE ENCOUNTER
ibuprofen (ADVIL/MOTRIN) 800 MG tablet     Last Written Prescription Date:  5-2-24  Last Fill Quantity: 100,   # refills: 1  Last Office Visit: 2-21-25  Future Office visit:       Routing refill request to provider for review/approval because:  Drug not on the [facility], [facility] or [facility] refill protocol or controlled substance

## (undated) DEVICE — CANISTER SUCTION MEDI-VAC GUARDIAN 2000ML 90D 65651-220

## (undated) DEVICE — CONNECTOR ERBEFLO 2 PORT 20325-215

## (undated) DEVICE — ESU CORD BIPOLAR GREEN 10-4000

## (undated) DEVICE — DRAPE LAP W/ARMBOARD 29410

## (undated) DEVICE — RX BACITRACIN OINTMENT 0.9G 1/32OZ CUR001109

## (undated) DEVICE — MITT SURGICAL PREP HAIR REMOVER

## (undated) DEVICE — BNDG KLING 2" 2231

## (undated) DEVICE — DRSG GAUZE 4X4" 3033

## (undated) DEVICE — DECANTER BAG 2002S

## (undated) DEVICE — SU PDS II 4-0 SH 27" Z315H

## (undated) DEVICE — GLOVE PROTEXIS POWDER FREE SMT 7.5  2D72PT75X

## (undated) DEVICE — DRAIN PENROSE 0.25"X18" LATEX FREE GR201

## (undated) DEVICE — DRSG XEROFORM 1X8"

## (undated) DEVICE — SU CHROMIC 3-0 FS-2 27" 636

## (undated) DEVICE — SU ETHIBOND 2-0 SHDA 30" X563H

## (undated) DEVICE — LINEN TOWEL PACK X5 5464

## (undated) DEVICE — SUCTION MANIFOLD NEPTUNE 2 SYS 4 PORT 0702-020-000

## (undated) DEVICE — Device

## (undated) DEVICE — BLADE CLIPPER SGL USE 9680

## (undated) DEVICE — SOL NACL 0.9% INJ 1000ML BAG 2B1324X

## (undated) DEVICE — SU CHROMIC 4-0 RB-1 27" U203H

## (undated) DEVICE — ESU GROUND PAD ADULT W/CORD E7507

## (undated) DEVICE — PACK MINOR CUSTOM ASC

## (undated) DEVICE — SYR 50ML LL W/O NDL 309653

## (undated) DEVICE — PREP CHLORAPREP 26ML TINTED ORANGE  260815

## (undated) DEVICE — SOL WATER IRRIG 1000ML BOTTLE 2F7114

## (undated) DEVICE — TUBING SUCTION 20FT N620A

## (undated) DEVICE — FORCEPS BIOPSY RADIAL JAW 4 LARGE W/NEEDLE 240CM M00513332

## (undated) DEVICE — SOL WATER IRRIG 500ML BOTTLE 2F7113

## (undated) DEVICE — NDL BUTTERFLY 21GA .75" 367296

## (undated) RX ORDER — PROPOFOL 10 MG/ML
INJECTION, EMULSION INTRAVENOUS
Status: DISPENSED
Start: 2023-10-31

## (undated) RX ORDER — ONDANSETRON 2 MG/ML
INJECTION INTRAMUSCULAR; INTRAVENOUS
Status: DISPENSED
Start: 2023-10-31

## (undated) RX ORDER — GLYCOPYRROLATE 0.2 MG/ML
INJECTION INTRAMUSCULAR; INTRAVENOUS
Status: DISPENSED
Start: 2023-10-31

## (undated) RX ORDER — CEFAZOLIN SODIUM 2 G/50ML
SOLUTION INTRAVENOUS
Status: DISPENSED
Start: 2023-10-31

## (undated) RX ORDER — FENTANYL CITRATE 50 UG/ML
INJECTION, SOLUTION INTRAMUSCULAR; INTRAVENOUS
Status: DISPENSED
Start: 2023-10-31

## (undated) RX ORDER — DEXAMETHASONE SODIUM PHOSPHATE 4 MG/ML
INJECTION, SOLUTION INTRA-ARTICULAR; INTRALESIONAL; INTRAMUSCULAR; INTRAVENOUS; SOFT TISSUE
Status: DISPENSED
Start: 2023-10-31

## (undated) RX ORDER — BUPIVACAINE HYDROCHLORIDE 5 MG/ML
INJECTION, SOLUTION EPIDURAL; INTRACAUDAL
Status: DISPENSED
Start: 2023-10-31